# Patient Record
Sex: FEMALE | Race: WHITE | Employment: OTHER | URBAN - METROPOLITAN AREA
[De-identification: names, ages, dates, MRNs, and addresses within clinical notes are randomized per-mention and may not be internally consistent; named-entity substitution may affect disease eponyms.]

---

## 2017-05-17 PROBLEM — S02.401A MAXILLARY FRACTURE (HCC): Status: ACTIVE | Noted: 2017-05-17

## 2017-05-17 PROBLEM — S13.100A CLOSED SUBLUXATION OF CERVICAL SPINE: Status: ACTIVE | Noted: 2017-05-17

## 2017-08-29 PROBLEM — M54.6 ACUTE THORACIC BACK PAIN: Status: ACTIVE | Noted: 2017-08-29

## 2017-10-03 PROBLEM — M48.54XA COMPRESSION FRACTURE OF THORACIC SPINE, NON-TRAUMATIC (HCC): Status: ACTIVE | Noted: 2017-10-03

## 2017-10-09 PROBLEM — S02.401A MAXILLARY FRACTURE (HCC): Status: RESOLVED | Noted: 2017-05-17 | Resolved: 2017-10-09

## 2017-10-09 PROBLEM — M54.6 ACUTE THORACIC BACK PAIN: Status: RESOLVED | Noted: 2017-08-29 | Resolved: 2017-10-09

## 2018-03-13 RX ORDER — CEPHALEXIN 500 MG/1
500 CAPSULE ORAL 4 TIMES DAILY
Qty: 40 CAPSULE | Refills: 0 | Status: SHIPPED | OUTPATIENT
Start: 2018-03-13 | End: 2018-03-23

## 2018-04-19 ENCOUNTER — HOSPITAL ENCOUNTER (OUTPATIENT)
Age: 83
Discharge: HOME | End: 2018-04-21
Payer: COMMERCIAL

## 2018-04-19 ENCOUNTER — OFFICE VISIT (OUTPATIENT)
Dept: FAMILY MEDICINE CLINIC | Age: 83
End: 2018-04-19
Payer: COMMERCIAL

## 2018-04-19 VITALS
DIASTOLIC BLOOD PRESSURE: 99 MMHG | TEMPERATURE: 97.9 F | HEART RATE: 95 BPM | BODY MASS INDEX: 25.13 KG/M2 | RESPIRATION RATE: 16 BRPM | SYSTOLIC BLOOD PRESSURE: 149 MMHG | WEIGHT: 108.6 LBS | HEIGHT: 55 IN | OXYGEN SATURATION: 98 %

## 2018-04-19 DIAGNOSIS — Z87.440 HISTORY OF UTI: ICD-10-CM

## 2018-04-19 DIAGNOSIS — I10 ESSENTIAL HYPERTENSION: ICD-10-CM

## 2018-04-19 DIAGNOSIS — M48.54XS COMPRESSION FRACTURE OF THORACIC SPINE, NON-TRAUMATIC, SEQUELA: ICD-10-CM

## 2018-04-19 DIAGNOSIS — M80.00XS AGE-RELATED OSTEOPOROSIS WITH CURRENT PATHOLOGICAL FRACTURE, SEQUELA: ICD-10-CM

## 2018-04-19 DIAGNOSIS — K21.9 GASTROESOPHAGEAL REFLUX DISEASE WITHOUT ESOPHAGITIS: ICD-10-CM

## 2018-04-19 DIAGNOSIS — Z00.00 HEALTH CARE MAINTENANCE: Primary | ICD-10-CM

## 2018-04-19 LAB
BILIRUBIN, POC: NEGATIVE
BLOOD URINE, POC: NORMAL
CLARITY, POC: NORMAL
COLOR, POC: YELLOW
GLUCOSE URINE, POC: NEGATIVE
KETONES, POC: NEGATIVE
LEUKOCYTE EST, POC: NORMAL
NITRITE, POC: POSITIVE
PH, POC: 6.5
PROTEIN, POC: NEGATIVE
SPECIFIC GRAVITY, POC: 1.01
UROBILINOGEN, POC: 0.2

## 2018-04-19 PROCEDURE — 81002 URINALYSIS NONAUTO W/O SCOPE: CPT | Performed by: NURSE PRACTITIONER

## 2018-04-19 PROCEDURE — 87186 SC STD MICRODIL/AGAR DIL: CPT

## 2018-04-19 PROCEDURE — 87077 CULTURE AEROBIC IDENTIFY: CPT

## 2018-04-19 PROCEDURE — 87088 URINE BACTERIA CULTURE: CPT

## 2018-04-19 PROCEDURE — 99214 OFFICE O/P EST MOD 30 MIN: CPT | Performed by: NURSE PRACTITIONER

## 2018-04-19 RX ORDER — MIRTAZAPINE 15 MG/1
15 TABLET, FILM COATED ORAL NIGHTLY
Qty: 90 TABLET | Refills: 1 | Status: SHIPPED | OUTPATIENT
Start: 2018-04-19 | End: 2018-08-23 | Stop reason: SDUPTHER

## 2018-04-19 RX ORDER — CEPHALEXIN 500 MG/1
500 CAPSULE ORAL 3 TIMES DAILY
Qty: 30 CAPSULE | Refills: 0 | Status: SHIPPED | OUTPATIENT
Start: 2018-04-19 | End: 2018-04-29

## 2018-04-19 RX ORDER — LISINOPRIL 10 MG/1
10 TABLET ORAL DAILY
Qty: 90 TABLET | Refills: 1 | Status: SHIPPED | OUTPATIENT
Start: 2018-04-19 | End: 2018-10-22 | Stop reason: SDUPTHER

## 2018-04-19 RX ORDER — OMEPRAZOLE 20 MG/1
20 CAPSULE, DELAYED RELEASE ORAL 2 TIMES DAILY
Qty: 180 CAPSULE | Refills: 1 | Status: SHIPPED | OUTPATIENT
Start: 2018-04-19 | End: 2018-07-02 | Stop reason: DRUGHIGH

## 2018-04-19 RX ORDER — ALENDRONATE SODIUM 70 MG/1
70 TABLET ORAL
Qty: 12 TABLET | Refills: 3 | Status: SHIPPED | OUTPATIENT
Start: 2018-04-19 | End: 2019-05-31 | Stop reason: SDUPTHER

## 2018-04-19 RX ORDER — ACETAMINOPHEN AND CODEINE PHOSPHATE 300; 30 MG/1; MG/1
1-2 TABLET ORAL EVERY 6 HOURS PRN
Qty: 30 TABLET | Refills: 3 | Status: SHIPPED | OUTPATIENT
Start: 2018-04-19 | End: 2018-05-19

## 2018-04-22 ENCOUNTER — HOSPITAL ENCOUNTER (OUTPATIENT)
Age: 83
Discharge: HOME | End: 2018-04-22
Payer: COMMERCIAL

## 2018-04-22 DIAGNOSIS — I10 ESSENTIAL HYPERTENSION: ICD-10-CM

## 2018-04-22 DIAGNOSIS — Z00.00 HEALTH CARE MAINTENANCE: ICD-10-CM

## 2018-04-22 LAB
ALBUMIN SERPL-MCNC: 4.3 G/DL (ref 3.5–5.2)
ALP BLD-CCNC: 63 U/L (ref 35–104)
ALT SERPL-CCNC: 12 U/L (ref 0–32)
ANION GAP SERPL CALCULATED.3IONS-SCNC: 13 MMOL/L (ref 7–16)
AST SERPL-CCNC: 23 U/L (ref 0–31)
BILIRUB SERPL-MCNC: 0.2 MG/DL (ref 0–1.2)
BUN BLDV-MCNC: 21 MG/DL (ref 8–23)
CALCIUM SERPL-MCNC: 9.8 MG/DL (ref 8.6–10.2)
CHLORIDE BLD-SCNC: 105 MMOL/L (ref 98–107)
CHOLESTEROL, TOTAL: 181 MG/DL (ref 0–199)
CO2: 24 MMOL/L (ref 22–29)
CREAT SERPL-MCNC: 1.2 MG/DL (ref 0.5–1)
GFR AFRICAN AMERICAN: 51
GFR NON-AFRICAN AMERICAN: 42 ML/MIN/1.73
GLUCOSE BLD-MCNC: 114 MG/DL (ref 74–109)
HCT VFR BLD CALC: 34 % (ref 34–48)
HDLC SERPL-MCNC: 62 MG/DL
HEMOGLOBIN: 10.8 G/DL (ref 11.5–15.5)
LDL CHOLESTEROL CALCULATED: 93 MG/DL (ref 0–99)
MCH RBC QN AUTO: 29 PG (ref 26–35)
MCHC RBC AUTO-ENTMCNC: 31.8 % (ref 32–34.5)
MCV RBC AUTO: 91.2 FL (ref 80–99.9)
PDW BLD-RTO: 13.8 FL (ref 11.5–15)
PLATELET # BLD: 181 E9/L (ref 130–450)
PMV BLD AUTO: 9.1 FL (ref 7–12)
POTASSIUM SERPL-SCNC: 4.8 MMOL/L (ref 3.5–5)
RBC # BLD: 3.73 E12/L (ref 3.5–5.5)
SODIUM BLD-SCNC: 142 MMOL/L (ref 132–146)
TOTAL PROTEIN: 8.1 G/DL (ref 6.4–8.3)
TRIGL SERPL-MCNC: 132 MG/DL (ref 0–149)
TSH SERPL DL<=0.05 MIU/L-ACNC: 3.41 UIU/ML (ref 0.27–4.2)
VLDLC SERPL CALC-MCNC: 26 MG/DL
WBC # BLD: 7.1 E9/L (ref 4.5–11.5)

## 2018-04-22 PROCEDURE — 36415 COLL VENOUS BLD VENIPUNCTURE: CPT

## 2018-04-22 PROCEDURE — 80053 COMPREHEN METABOLIC PANEL: CPT

## 2018-04-22 PROCEDURE — 85027 COMPLETE CBC AUTOMATED: CPT

## 2018-04-22 PROCEDURE — 80061 LIPID PANEL: CPT

## 2018-04-22 PROCEDURE — 84443 ASSAY THYROID STIM HORMONE: CPT

## 2018-04-23 LAB — URINE CULTURE, ROUTINE: NORMAL

## 2018-04-29 ASSESSMENT — ENCOUNTER SYMPTOMS
ANAL BLEEDING: 0
EYE DISCHARGE: 0
BLOOD IN STOOL: 0
ABDOMINAL DISTENTION: 0
CONSTIPATION: 0
DIARRHEA: 0
CHEST TIGHTNESS: 0
NAUSEA: 0
WHEEZING: 0
STRIDOR: 0
EYE ITCHING: 0
EYE REDNESS: 0
RHINORRHEA: 0
EYE PAIN: 0
TROUBLE SWALLOWING: 0
CHOKING: 0
ABDOMINAL PAIN: 0
FACIAL SWELLING: 0
VOMITING: 0
SORE THROAT: 0
SHORTNESS OF BREATH: 0
VOICE CHANGE: 0
RECTAL PAIN: 0
APNEA: 0
COUGH: 0
PHOTOPHOBIA: 0
COLOR CHANGE: 0

## 2018-05-08 ENCOUNTER — NURSE ONLY (OUTPATIENT)
Dept: FAMILY MEDICINE CLINIC | Age: 83
End: 2018-05-08
Payer: COMMERCIAL

## 2018-05-08 DIAGNOSIS — K59.03 DRUG-INDUCED CONSTIPATION: ICD-10-CM

## 2018-05-08 DIAGNOSIS — R35.0 URINE FREQUENCY: Primary | ICD-10-CM

## 2018-05-08 DIAGNOSIS — M48.54XS COMPRESSION FRACTURE OF THORACIC SPINE, NON-TRAUMATIC, SEQUELA: ICD-10-CM

## 2018-05-08 LAB
APPEARANCE FLUID: NORMAL
BILIRUBIN, POC: NEGATIVE
BLOOD URINE, POC: NORMAL
CLARITY, POC: CLEAR
COLOR, POC: NORMAL
GLUCOSE URINE, POC: NEGATIVE
KETONES, POC: NEGATIVE
LEUKOCYTE EST, POC: NORMAL
NITRITE, POC: NEGATIVE
PH, POC: 5.5
PROTEIN, POC: NEGATIVE
SPECIFIC GRAVITY, POC: 1.02
UROBILINOGEN, POC: 0.2

## 2018-05-08 PROCEDURE — 81002 URINALYSIS NONAUTO W/O SCOPE: CPT | Performed by: NURSE PRACTITIONER

## 2018-05-08 RX ORDER — ACETAMINOPHEN AND CODEINE PHOSPHATE 300; 30 MG/1; MG/1
1-2 TABLET ORAL EVERY 6 HOURS PRN
Qty: 30 TABLET | Refills: 3 | Status: CANCELLED | OUTPATIENT
Start: 2018-05-08 | End: 2018-06-07

## 2018-05-08 RX ORDER — DOCUSATE SODIUM 100 MG/1
100 CAPSULE, LIQUID FILLED ORAL 2 TIMES DAILY
Qty: 60 CAPSULE | Refills: 2 | Status: CANCELLED | OUTPATIENT
Start: 2018-05-08

## 2018-05-11 ENCOUNTER — TELEPHONE (OUTPATIENT)
Dept: FAMILY MEDICINE CLINIC | Age: 83
End: 2018-05-11

## 2018-05-24 ENCOUNTER — HOSPITAL ENCOUNTER (OUTPATIENT)
Age: 83
Discharge: HOME | End: 2018-05-26
Payer: COMMERCIAL

## 2018-05-24 PROCEDURE — 87088 URINE BACTERIA CULTURE: CPT

## 2018-05-24 PROCEDURE — 87186 SC STD MICRODIL/AGAR DIL: CPT

## 2018-05-24 PROCEDURE — 87077 CULTURE AEROBIC IDENTIFY: CPT

## 2018-05-26 LAB
ORGANISM: ABNORMAL
URINE CULTURE, ROUTINE: ABNORMAL
URINE CULTURE, ROUTINE: ABNORMAL

## 2018-06-06 ENCOUNTER — HOSPITAL ENCOUNTER (OUTPATIENT)
Dept: ULTRASOUND IMAGING | Age: 83
Discharge: HOME | End: 2018-06-08
Payer: COMMERCIAL

## 2018-06-06 DIAGNOSIS — N39.0 URINARY TRACT INFECTION WITHOUT HEMATURIA, SITE UNSPECIFIED: ICD-10-CM

## 2018-06-06 DIAGNOSIS — N81.10 CYSTOCELE WITHOUT UTERINE PROLAPSE: ICD-10-CM

## 2018-06-06 PROCEDURE — 76770 US EXAM ABDO BACK WALL COMP: CPT

## 2018-06-13 ENCOUNTER — HOSPITAL ENCOUNTER (OUTPATIENT)
Age: 83
Discharge: HOME | End: 2018-06-15
Payer: COMMERCIAL

## 2018-06-13 PROCEDURE — 87077 CULTURE AEROBIC IDENTIFY: CPT

## 2018-06-13 PROCEDURE — 87186 SC STD MICRODIL/AGAR DIL: CPT

## 2018-06-13 PROCEDURE — 87088 URINE BACTERIA CULTURE: CPT

## 2018-06-15 LAB
ORGANISM: ABNORMAL
URINE CULTURE, ROUTINE: ABNORMAL
URINE CULTURE, ROUTINE: ABNORMAL

## 2018-06-25 ENCOUNTER — TELEPHONE (OUTPATIENT)
Dept: FAMILY MEDICINE CLINIC | Age: 83
End: 2018-06-25

## 2018-06-25 ENCOUNTER — HOSPITAL ENCOUNTER (OUTPATIENT)
Age: 83
Discharge: HOME | DRG: 690 | End: 2018-06-25
Payer: COMMERCIAL

## 2018-06-25 DIAGNOSIS — N39.0 RECURRENT UTI: Primary | ICD-10-CM

## 2018-06-25 DIAGNOSIS — N39.0 RECURRENT UTI: ICD-10-CM

## 2018-06-25 LAB
BACTERIA: ABNORMAL /HPF
BILIRUBIN URINE: NEGATIVE
BLOOD, URINE: ABNORMAL
CLARITY: ABNORMAL
COLOR: YELLOW
GLUCOSE URINE: NEGATIVE MG/DL
KETONES, URINE: NEGATIVE MG/DL
LEUKOCYTE ESTERASE, URINE: ABNORMAL
NITRITE, URINE: NEGATIVE
PH UA: 6.5 (ref 5–9)
PROTEIN UA: NEGATIVE MG/DL
RBC UA: ABNORMAL /HPF (ref 0–2)
SPECIFIC GRAVITY UA: 1.01 (ref 1–1.03)
UROBILINOGEN, URINE: 0.2 E.U./DL
WBC UA: >20 /HPF (ref 0–5)

## 2018-06-25 PROCEDURE — 87088 URINE BACTERIA CULTURE: CPT

## 2018-06-25 PROCEDURE — 87186 SC STD MICRODIL/AGAR DIL: CPT

## 2018-06-25 PROCEDURE — 81001 URINALYSIS AUTO W/SCOPE: CPT

## 2018-06-27 ENCOUNTER — HOSPITAL ENCOUNTER (INPATIENT)
Age: 83
LOS: 1 days | Discharge: HOME HEALTH | DRG: 690 | End: 2018-06-28
Attending: EMERGENCY MEDICINE | Admitting: INTERNAL MEDICINE
Payer: COMMERCIAL

## 2018-06-27 ENCOUNTER — ANESTHESIA EVENT (OUTPATIENT)
Dept: OPERATING ROOM | Age: 83
DRG: 690 | End: 2018-06-27
Payer: COMMERCIAL

## 2018-06-27 ENCOUNTER — APPOINTMENT (OUTPATIENT)
Dept: CT IMAGING | Age: 83
DRG: 690 | End: 2018-06-27
Payer: COMMERCIAL

## 2018-06-27 DIAGNOSIS — R52 PAIN: ICD-10-CM

## 2018-06-27 DIAGNOSIS — N20.0 KIDNEY STONE: Primary | ICD-10-CM

## 2018-06-27 DIAGNOSIS — R10.9 FLANK PAIN: ICD-10-CM

## 2018-06-27 LAB
ALBUMIN SERPL-MCNC: 4.3 G/DL (ref 3.5–5.2)
ALP BLD-CCNC: 52 U/L (ref 35–104)
ALT SERPL-CCNC: 10 U/L (ref 0–32)
ANION GAP SERPL CALCULATED.3IONS-SCNC: 14 MMOL/L (ref 7–16)
AST SERPL-CCNC: 18 U/L (ref 0–31)
BACTERIA: ABNORMAL /HPF
BILIRUB SERPL-MCNC: <0.2 MG/DL (ref 0–1.2)
BILIRUBIN URINE: NEGATIVE
BLOOD, URINE: ABNORMAL
BUN BLDV-MCNC: 30 MG/DL (ref 8–23)
CALCIUM SERPL-MCNC: 9 MG/DL (ref 8.6–10.2)
CHLORIDE BLD-SCNC: 105 MMOL/L (ref 98–107)
CLARITY: ABNORMAL
CO2: 20 MMOL/L (ref 22–29)
COLOR: YELLOW
CREAT SERPL-MCNC: 1.2 MG/DL (ref 0.5–1)
GFR AFRICAN AMERICAN: 51
GFR NON-AFRICAN AMERICAN: 42 ML/MIN/1.73
GLUCOSE BLD-MCNC: 144 MG/DL (ref 74–109)
GLUCOSE URINE: NEGATIVE MG/DL
HCT VFR BLD CALC: 31 % (ref 34–48)
HEMOGLOBIN: 10 G/DL (ref 11.5–15.5)
KETONES, URINE: NEGATIVE MG/DL
LACTIC ACID: 1.7 MMOL/L (ref 0.5–2.2)
LEUKOCYTE ESTERASE, URINE: ABNORMAL
MCH RBC QN AUTO: 29.2 PG (ref 26–35)
MCHC RBC AUTO-ENTMCNC: 32.3 % (ref 32–34.5)
MCV RBC AUTO: 90.6 FL (ref 80–99.9)
MUCUS: PRESENT
NITRITE, URINE: POSITIVE
PDW BLD-RTO: 14.4 FL (ref 11.5–15)
PH UA: 8.5 (ref 5–9)
PLATELET # BLD: 163 E9/L (ref 130–450)
PMV BLD AUTO: 9.7 FL (ref 7–12)
POTASSIUM SERPL-SCNC: 4.6 MMOL/L (ref 3.5–5)
PROTEIN UA: 100 MG/DL
RBC # BLD: 3.42 E12/L (ref 3.5–5.5)
RBC UA: ABNORMAL /HPF (ref 0–2)
SODIUM BLD-SCNC: 139 MMOL/L (ref 132–146)
SPECIFIC GRAVITY UA: 1.01 (ref 1–1.03)
TOTAL PROTEIN: 7.1 G/DL (ref 6.4–8.3)
UROBILINOGEN, URINE: 0.2 E.U./DL
WBC # BLD: 11.1 E9/L (ref 4.5–11.5)
WBC UA: >20 /HPF (ref 0–5)

## 2018-06-27 PROCEDURE — 85027 COMPLETE CBC AUTOMATED: CPT

## 2018-06-27 PROCEDURE — 3700000001 HC ADD 15 MINUTES (ANESTHESIA): Performed by: UROLOGY

## 2018-06-27 PROCEDURE — 74176 CT ABD & PELVIS W/O CONTRAST: CPT

## 2018-06-27 PROCEDURE — 6360000002 HC RX W HCPCS: Performed by: NURSE ANESTHETIST, CERTIFIED REGISTERED

## 2018-06-27 PROCEDURE — C2617 STENT, NON-COR, TEM W/O DEL: HCPCS | Performed by: UROLOGY

## 2018-06-27 PROCEDURE — 2500000003 HC RX 250 WO HCPCS: Performed by: NURSE ANESTHETIST, CERTIFIED REGISTERED

## 2018-06-27 PROCEDURE — 7100000001 HC PACU RECOVERY - ADDTL 15 MIN: Performed by: UROLOGY

## 2018-06-27 PROCEDURE — 7100000000 HC PACU RECOVERY - FIRST 15 MIN: Performed by: UROLOGY

## 2018-06-27 PROCEDURE — 99285 EMERGENCY DEPT VISIT HI MDM: CPT

## 2018-06-27 PROCEDURE — 2580000003 HC RX 258: Performed by: EMERGENCY MEDICINE

## 2018-06-27 PROCEDURE — 3600000003 HC SURGERY LEVEL 3 BASE: Performed by: UROLOGY

## 2018-06-27 PROCEDURE — C1769 GUIDE WIRE: HCPCS | Performed by: UROLOGY

## 2018-06-27 PROCEDURE — 0T768DZ DILATION OF RIGHT URETER WITH INTRALUMINAL DEVICE, VIA NATURAL OR ARTIFICIAL OPENING ENDOSCOPIC: ICD-10-PCS | Performed by: UROLOGY

## 2018-06-27 PROCEDURE — BT1D1ZZ FLUOROSCOPY OF RIGHT KIDNEY, URETER AND BLADDER USING LOW OSMOLAR CONTRAST: ICD-10-PCS | Performed by: UROLOGY

## 2018-06-27 PROCEDURE — 3700000000 HC ANESTHESIA ATTENDED CARE: Performed by: UROLOGY

## 2018-06-27 PROCEDURE — 81001 URINALYSIS AUTO W/SCOPE: CPT

## 2018-06-27 PROCEDURE — 36415 COLL VENOUS BLD VENIPUNCTURE: CPT

## 2018-06-27 PROCEDURE — 2580000003 HC RX 258: Performed by: NURSE ANESTHETIST, CERTIFIED REGISTERED

## 2018-06-27 PROCEDURE — 2060000000 HC ICU INTERMEDIATE R&B

## 2018-06-27 PROCEDURE — 87040 BLOOD CULTURE FOR BACTERIA: CPT

## 2018-06-27 PROCEDURE — 80053 COMPREHEN METABOLIC PANEL: CPT

## 2018-06-27 PROCEDURE — 83605 ASSAY OF LACTIC ACID: CPT

## 2018-06-27 PROCEDURE — 3600000013 HC SURGERY LEVEL 3 ADDTL 15MIN: Performed by: UROLOGY

## 2018-06-27 DEVICE — URETERAL STENT
Type: IMPLANTABLE DEVICE | Status: FUNCTIONAL
Brand: PERCUFLEX™

## 2018-06-27 RX ORDER — 0.9 % SODIUM CHLORIDE 0.9 %
500 INTRAVENOUS SOLUTION INTRAVENOUS ONCE
Status: COMPLETED | OUTPATIENT
Start: 2018-06-27 | End: 2018-06-28

## 2018-06-27 RX ORDER — PROPOFOL 10 MG/ML
INJECTION, EMULSION INTRAVENOUS PRN
Status: DISCONTINUED | OUTPATIENT
Start: 2018-06-27 | End: 2018-06-28 | Stop reason: SDUPTHER

## 2018-06-27 RX ORDER — FENTANYL CITRATE 50 UG/ML
INJECTION, SOLUTION INTRAMUSCULAR; INTRAVENOUS PRN
Status: DISCONTINUED | OUTPATIENT
Start: 2018-06-27 | End: 2018-06-28 | Stop reason: SDUPTHER

## 2018-06-27 RX ORDER — SODIUM CHLORIDE 9 MG/ML
INJECTION, SOLUTION INTRAVENOUS CONTINUOUS PRN
Status: DISCONTINUED | OUTPATIENT
Start: 2018-06-27 | End: 2018-06-28 | Stop reason: SDUPTHER

## 2018-06-27 RX ORDER — FENTANYL CITRATE 50 UG/ML
25 INJECTION, SOLUTION INTRAMUSCULAR; INTRAVENOUS EVERY 5 MIN PRN
Status: DISCONTINUED | OUTPATIENT
Start: 2018-06-27 | End: 2018-06-28

## 2018-06-27 RX ORDER — FENTANYL CITRATE 50 UG/ML
50 INJECTION, SOLUTION INTRAMUSCULAR; INTRAVENOUS EVERY 5 MIN PRN
Status: DISCONTINUED | OUTPATIENT
Start: 2018-06-27 | End: 2018-06-28

## 2018-06-27 RX ADMIN — SODIUM CHLORIDE 500 ML: 9 INJECTION, SOLUTION INTRAVENOUS at 20:50

## 2018-06-27 RX ADMIN — PROPOFOL 90 MG: 10 INJECTION, EMULSION INTRAVENOUS at 23:51

## 2018-06-27 RX ADMIN — LIDOCAINE HYDROCHLORIDE 50 MG: 20 INJECTION, SOLUTION EPIDURAL; INFILTRATION; INTRACAUDAL; PERINEURAL at 23:50

## 2018-06-27 RX ADMIN — SODIUM CHLORIDE: 9 INJECTION, SOLUTION INTRAVENOUS at 23:16

## 2018-06-27 RX ADMIN — FENTANYL CITRATE 20 MCG: 50 INJECTION, SOLUTION INTRAMUSCULAR; INTRAVENOUS at 23:41

## 2018-06-27 RX ADMIN — FENTANYL CITRATE 20 MCG: 50 INJECTION, SOLUTION INTRAMUSCULAR; INTRAVENOUS at 23:27

## 2018-06-27 ASSESSMENT — PAIN DESCRIPTION - PAIN TYPE: TYPE: ACUTE PAIN

## 2018-06-27 ASSESSMENT — PULMONARY FUNCTION TESTS
PIF_VALUE: 0
PIF_VALUE: 1
PIF_VALUE: 0

## 2018-06-27 ASSESSMENT — PAIN DESCRIPTION - ORIENTATION: ORIENTATION: RIGHT

## 2018-06-27 ASSESSMENT — PAIN SCALES - GENERAL: PAINLEVEL_OUTOF10: 6

## 2018-06-27 ASSESSMENT — PAIN DESCRIPTION - LOCATION: LOCATION: ABDOMEN;FLANK

## 2018-06-27 NOTE — ED NOTES
Bed: 20  Expected date:   Expected time:   Means of arrival:   Comments:  ems     Danny Joshi RN  06/27/18 [id number]
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
## 2018-06-28 ENCOUNTER — ANESTHESIA (OUTPATIENT)
Dept: OPERATING ROOM | Age: 83
DRG: 690 | End: 2018-06-28
Payer: COMMERCIAL

## 2018-06-28 ENCOUNTER — APPOINTMENT (OUTPATIENT)
Dept: GENERAL RADIOLOGY | Age: 83
DRG: 690 | End: 2018-06-28
Payer: COMMERCIAL

## 2018-06-28 VITALS
DIASTOLIC BLOOD PRESSURE: 64 MMHG | HEART RATE: 78 BPM | WEIGHT: 110 LBS | HEIGHT: 59 IN | SYSTOLIC BLOOD PRESSURE: 120 MMHG | BODY MASS INDEX: 22.18 KG/M2 | RESPIRATION RATE: 18 BRPM | TEMPERATURE: 99.9 F | OXYGEN SATURATION: 96 %

## 2018-06-28 VITALS — SYSTOLIC BLOOD PRESSURE: 124 MMHG | DIASTOLIC BLOOD PRESSURE: 91 MMHG | OXYGEN SATURATION: 98 %

## 2018-06-28 LAB
ANION GAP SERPL CALCULATED.3IONS-SCNC: 11 MMOL/L (ref 7–16)
BASOPHILS ABSOLUTE: 0.02 E9/L (ref 0–0.2)
BASOPHILS RELATIVE PERCENT: 0.3 % (ref 0–2)
BUN BLDV-MCNC: 22 MG/DL (ref 8–23)
CALCIUM SERPL-MCNC: 8 MG/DL (ref 8.6–10.2)
CHLORIDE BLD-SCNC: 108 MMOL/L (ref 98–107)
CO2: 20 MMOL/L (ref 22–29)
CREAT SERPL-MCNC: 1 MG/DL (ref 0.5–1)
EOSINOPHILS ABSOLUTE: 0.07 E9/L (ref 0.05–0.5)
EOSINOPHILS RELATIVE PERCENT: 1 % (ref 0–6)
GFR AFRICAN AMERICAN: >60
GFR NON-AFRICAN AMERICAN: 52 ML/MIN/1.73
GLUCOSE BLD-MCNC: 157 MG/DL (ref 74–109)
HCT VFR BLD CALC: 24.6 % (ref 34–48)
HEMOGLOBIN: 8.1 G/DL (ref 11.5–15.5)
IMMATURE GRANULOCYTES #: 0.03 E9/L
IMMATURE GRANULOCYTES %: 0.4 % (ref 0–5)
LYMPHOCYTES ABSOLUTE: 2.03 E9/L (ref 1.5–4)
LYMPHOCYTES RELATIVE PERCENT: 28 % (ref 20–42)
MCH RBC QN AUTO: 29.2 PG (ref 26–35)
MCHC RBC AUTO-ENTMCNC: 32.9 % (ref 32–34.5)
MCV RBC AUTO: 88.8 FL (ref 80–99.9)
MONOCYTES ABSOLUTE: 1.02 E9/L (ref 0.1–0.95)
MONOCYTES RELATIVE PERCENT: 14 % (ref 2–12)
NEUTROPHILS ABSOLUTE: 4.09 E9/L (ref 1.8–7.3)
NEUTROPHILS RELATIVE PERCENT: 56.3 % (ref 43–80)
ORGANISM: ABNORMAL
PDW BLD-RTO: 14.2 FL (ref 11.5–15)
PLATELET # BLD: 125 E9/L (ref 130–450)
PMV BLD AUTO: 10 FL (ref 7–12)
POTASSIUM REFLEX MAGNESIUM: 4.3 MMOL/L (ref 3.5–5)
RBC # BLD: 2.77 E12/L (ref 3.5–5.5)
SODIUM BLD-SCNC: 139 MMOL/L (ref 132–146)
URINE CULTURE, ROUTINE: ABNORMAL
URINE CULTURE, ROUTINE: ABNORMAL
WBC # BLD: 7.3 E9/L (ref 4.5–11.5)

## 2018-06-28 PROCEDURE — 6360000002 HC RX W HCPCS: Performed by: INTERNAL MEDICINE

## 2018-06-28 PROCEDURE — 2580000003 HC RX 258: Performed by: INTERNAL MEDICINE

## 2018-06-28 PROCEDURE — 80048 BASIC METABOLIC PNL TOTAL CA: CPT

## 2018-06-28 PROCEDURE — 2580000003 HC RX 258: Performed by: EMERGENCY MEDICINE

## 2018-06-28 PROCEDURE — 85025 COMPLETE CBC W/AUTO DIFF WBC: CPT

## 2018-06-28 PROCEDURE — 87088 URINE BACTERIA CULTURE: CPT

## 2018-06-28 PROCEDURE — 6360000002 HC RX W HCPCS: Performed by: EMERGENCY MEDICINE

## 2018-06-28 PROCEDURE — 74420 UROGRAPHY RTRGR +-KUB: CPT

## 2018-06-28 PROCEDURE — 6370000000 HC RX 637 (ALT 250 FOR IP): Performed by: UROLOGY

## 2018-06-28 PROCEDURE — 87186 SC STD MICRODIL/AGAR DIL: CPT

## 2018-06-28 PROCEDURE — 36415 COLL VENOUS BLD VENIPUNCTURE: CPT

## 2018-06-28 PROCEDURE — 6370000000 HC RX 637 (ALT 250 FOR IP): Performed by: INTERNAL MEDICINE

## 2018-06-28 RX ORDER — ONDANSETRON 2 MG/ML
4 INJECTION INTRAMUSCULAR; INTRAVENOUS EVERY 6 HOURS PRN
Status: DISCONTINUED | OUTPATIENT
Start: 2018-06-28 | End: 2018-06-28 | Stop reason: HOSPADM

## 2018-06-28 RX ORDER — ACETAMINOPHEN 325 MG/1
650 TABLET ORAL EVERY 4 HOURS PRN
Status: DISCONTINUED | OUTPATIENT
Start: 2018-06-28 | End: 2018-06-28 | Stop reason: HOSPADM

## 2018-06-28 RX ORDER — DIPHENHYDRAMINE HCL 25 MG
25 TABLET ORAL EVERY 6 HOURS PRN
Status: DISCONTINUED | OUTPATIENT
Start: 2018-06-28 | End: 2018-06-28 | Stop reason: HOSPADM

## 2018-06-28 RX ORDER — MIRTAZAPINE 15 MG/1
15 TABLET, FILM COATED ORAL NIGHTLY
Status: DISCONTINUED | OUTPATIENT
Start: 2018-06-28 | End: 2018-06-28 | Stop reason: HOSPADM

## 2018-06-28 RX ORDER — CEPHALEXIN 250 MG/1
500 CAPSULE ORAL 2 TIMES DAILY
Qty: 84 CAPSULE | Refills: 0 | Status: SHIPPED | OUTPATIENT
Start: 2018-06-28 | End: 2018-07-02 | Stop reason: DRUGHIGH

## 2018-06-28 RX ORDER — OXYCODONE HYDROCHLORIDE AND ACETAMINOPHEN 5; 325 MG/1; MG/1
1 TABLET ORAL EVERY 4 HOURS PRN
Status: DISCONTINUED | OUTPATIENT
Start: 2018-06-28 | End: 2018-06-28 | Stop reason: HOSPADM

## 2018-06-28 RX ORDER — LIDOCAINE HYDROCHLORIDE 20 MG/ML
INJECTION, SOLUTION EPIDURAL; INFILTRATION; INTRACAUDAL; PERINEURAL PRN
Status: DISCONTINUED | OUTPATIENT
Start: 2018-06-27 | End: 2018-06-28 | Stop reason: SDUPTHER

## 2018-06-28 RX ORDER — ALENDRONATE SODIUM 70 MG/1
70 TABLET ORAL
Status: DISCONTINUED | OUTPATIENT
Start: 2018-06-28 | End: 2018-06-28 | Stop reason: CLARIF

## 2018-06-28 RX ORDER — SODIUM CHLORIDE 0.9 % (FLUSH) 0.9 %
10 SYRINGE (ML) INJECTION PRN
Status: DISCONTINUED | OUTPATIENT
Start: 2018-06-28 | End: 2018-06-28 | Stop reason: SDUPTHER

## 2018-06-28 RX ORDER — SODIUM CHLORIDE 9 MG/ML
INJECTION, SOLUTION INTRAVENOUS CONTINUOUS
Status: DISCONTINUED | OUTPATIENT
Start: 2018-06-28 | End: 2018-06-28 | Stop reason: HOSPADM

## 2018-06-28 RX ORDER — OXYCODONE HYDROCHLORIDE AND ACETAMINOPHEN 5; 325 MG/1; MG/1
2 TABLET ORAL EVERY 4 HOURS PRN
Status: DISCONTINUED | OUTPATIENT
Start: 2018-06-28 | End: 2018-06-28 | Stop reason: HOSPADM

## 2018-06-28 RX ORDER — SODIUM CHLORIDE 0.9 % (FLUSH) 0.9 %
10 SYRINGE (ML) INJECTION EVERY 12 HOURS SCHEDULED
Status: DISCONTINUED | OUTPATIENT
Start: 2018-06-28 | End: 2018-06-28 | Stop reason: HOSPADM

## 2018-06-28 RX ORDER — SODIUM CHLORIDE 0.9 % (FLUSH) 0.9 %
10 SYRINGE (ML) INJECTION PRN
Status: DISCONTINUED | OUTPATIENT
Start: 2018-06-28 | End: 2018-06-28 | Stop reason: HOSPADM

## 2018-06-28 RX ORDER — SODIUM CHLORIDE 0.9 % (FLUSH) 0.9 %
10 SYRINGE (ML) INJECTION EVERY 12 HOURS SCHEDULED
Status: DISCONTINUED | OUTPATIENT
Start: 2018-06-28 | End: 2018-06-28 | Stop reason: SDUPTHER

## 2018-06-28 RX ORDER — LISINOPRIL 10 MG/1
10 TABLET ORAL DAILY
Status: DISCONTINUED | OUTPATIENT
Start: 2018-06-28 | End: 2018-06-28 | Stop reason: HOSPADM

## 2018-06-28 RX ADMIN — SODIUM CHLORIDE: 9 INJECTION, SOLUTION INTRAVENOUS at 14:00

## 2018-06-28 RX ADMIN — LISINOPRIL 10 MG: 10 TABLET ORAL at 09:56

## 2018-06-28 RX ADMIN — DEXTROSE MONOHYDRATE 1 G: 5 INJECTION, SOLUTION INTRAVENOUS at 00:43

## 2018-06-28 RX ADMIN — ACETAMINOPHEN 650 MG: 325 TABLET, FILM COATED ORAL at 14:03

## 2018-06-28 RX ADMIN — VANCOMYCIN HYDROCHLORIDE 1000 MG: 1 INJECTION, POWDER, LYOPHILIZED, FOR SOLUTION INTRAVENOUS at 04:33

## 2018-06-28 RX ADMIN — WATER 1 G: 1 INJECTION INTRAMUSCULAR; INTRAVENOUS; SUBCUTANEOUS at 14:00

## 2018-06-28 RX ADMIN — ENOXAPARIN SODIUM 30 MG: 30 INJECTION SUBCUTANEOUS at 09:56

## 2018-06-28 RX ADMIN — SODIUM CHLORIDE: 9 INJECTION, SOLUTION INTRAVENOUS at 02:09

## 2018-06-28 ASSESSMENT — PAIN DESCRIPTION - PROGRESSION: CLINICAL_PROGRESSION: NOT CHANGED

## 2018-06-28 ASSESSMENT — PAIN DESCRIPTION - FREQUENCY: FREQUENCY: INTERMITTENT

## 2018-06-28 ASSESSMENT — PAIN SCALES - GENERAL
PAINLEVEL_OUTOF10: 0
PAINLEVEL_OUTOF10: 6
PAINLEVEL_OUTOF10: 0

## 2018-06-28 ASSESSMENT — PAIN DESCRIPTION - PAIN TYPE: TYPE: ACUTE PAIN

## 2018-06-28 ASSESSMENT — PAIN DESCRIPTION - LOCATION: LOCATION: FLANK

## 2018-06-28 ASSESSMENT — PAIN DESCRIPTION - ONSET: ONSET: SUDDEN

## 2018-06-28 ASSESSMENT — PAIN DESCRIPTION - ORIENTATION: ORIENTATION: RIGHT

## 2018-06-28 ASSESSMENT — PAIN DESCRIPTION - DESCRIPTORS: DESCRIPTORS: ACHING;DISCOMFORT;SORE

## 2018-06-28 NOTE — PROGRESS NOTES
Pharmacy Note    Truefrain Bright was ordered fosamax. Per the [address], this medication is non-formulary and not stocked by pharmacy for the reason indicated below. The medication can be reordered at discharge.      Medications in which risks outweigh benefits during hospitalization:         -  oral bisphosphonates         -  raloxifene (Evista)    Medications that lack necessity during an acute hospital stay:      -  ezetimibe (Zetia)         -  nasal antihistamines        -  nasal miacalcin        -  acyclovir topical cream/ointment orders for herpes labialis (cold sores)
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
## 2018-06-28 NOTE — H&P
Reviewed in detail and negative for DM, CAD, Cancer, CVA. Positive as follows:    Family History   Problem Relation Age of Onset    Alzheimer's Disease Mother        REVIEW OF SYSTEMS:   Pertinent positives as noted in the HPI. All other systems reviewed and negative. PHYSICAL EXAM:    BP (!) 157/74   Pulse 76   Temp 98.2 °F (36.8 °C)   Resp 16   Ht 4' 11\" (1.499 m)   Wt 110 lb (49.9 kg)   SpO2 95%   BMI 22.22 kg/m²   General appearance: No apparent distress, appears stated age and cooperative. HEENT: Normal cephalic, atraumatic without obvious deformity. . Conjunctivae/corneas clear. Neck: Supple, No jugular venous distention. Respiratory:  Normal respiratory effort. Clear to auscultation, bilaterally without Rales/Wheezes/Rhonchi. Cardiovascular: Regular rate and rhythm with normal S1/S2 without murmurs, rubs or gallops. Abdomen: Soft,  non-distended with normal bowel sounds. Musculoskeletal: No  edema bilaterally. Skin:  No rashes or lesions. Neurologic:  Cranial nerves: II-XII intact, grossly non-focal.  Psychiatric: Alert and oriented, thought content appropriate. Labs:     Recent Labs      06/27/18 2015   WBC  11.1   HGB  10.0*   HCT  31.0*   PLT  163     Recent Labs      06/27/18 2015   NA  139   K  4.6   CL  105   CO2  20*   BUN  30*   CREATININE  1.2*   CALCIUM  9.0     Recent Labs      06/27/18 2015   AST  18   ALT  10   BILITOT  <0.2   ALKPHOS  52     No results for input(s): INR in the last 72 hours. No results for input(s): Lenice Blitz in the last 72 hours. Urinalysis:      Lab Results   Component Value Date    NITRU POSITIVE 06/27/2018    WBCUA >20 06/27/2018    BACTERIA MODERATE 06/27/2018    RBCUA 0-1 06/27/2018    BLOODU LARGE 06/27/2018    SPECGRAV 1.015 06/27/2018    GLUCOSEU Negative 06/27/2018         ASSESSMENT:    Active Hospital Problems    Diagnosis Date Noted    Kidney stone [N20.0] 06/27/2018     Flank pain: Secondary to renal stones.    Renal stone: Urology following.  IV hydration and IV antibiotics  Hydronephrosis-right  Solitary kidney  Hypertension   gerd  GILMER  UTI-send a urine culture  normocyic anemia      PLAN:  Ceftriaxone IV  Urology following  IV normal saline gentle hydration  Continue medications  Lisinopril  Blood cultures  Urine culture  Follow-up renal function tomorrow  Lower extremity prophylaxis   Full code     Ismael Diop MD

## 2018-06-28 NOTE — ED NOTES
Consent form signed by León Orlando for procedure and placed in chart      Chon Peralta RN  06/27/18 [id number]

## 2018-06-28 NOTE — PROGRESS NOTES
REPAIR  1974    TONSILLECTOMY  1939    TYMPANOSTOMY TUBE PLACEMENT  2000    UPPER GASTROINTESTINAL ENDOSCOPY  2003    WRIST FRACTURE SURGERY  1992        PAST FAMILY HISTORY:    Family History   Problem Relation Age of Onset    Alzheimer's Disease Mother        PAST SOCIAL HISTORY:    Social History     Social History    Marital status:      Spouse name: N/A    Number of children: N/A    Years of education: N/A     Social History Main Topics    Smoking status: Never Smoker    Smokeless tobacco: Never Used    Alcohol use No    Drug use: No    Sexual activity: Not Currently     Other Topics Concern    None     Social History Narrative    None       IV:    sodium chloride 75 mL/hr at 06/28/18 0209       PRN: acetaminophen, sodium chloride flush, magnesium hydroxide, ondansetron, diphenhydrAMINE, oxyCODONE-acetaminophen **OR** oxyCODONE-acetaminophen    Scheduled:    sodium chloride flush  10 mL Intravenous 2 times per day    enoxaparin  30 mg Subcutaneous Daily    mirtazapine  15 mg Oral Nightly    lisinopril  10 mg Oral Daily       Lab Results   Component Value Date     06/28/2018    K 4.3 06/28/2018    BUN 22 06/28/2018    CREATININE 1.0 06/28/2018        Lab Results   Component Value Date    HGB 8.1 06/28/2018    HCT 24.6 06/28/2018       Constitutional: Tired  Eyes: negative  Ears, nose, mouth, throat, and face: negative  Respiratory: negative  Cardiovascular: negative  Gastrointestinal: negative  Genitourinary: Stone  Musculoskeletal: Arthritis  Neurological: negative  Behavioral/Psych: negative  Endocrine: negative    Skin dry, without rashes  Respirations non-labored, intact  Abdomen soft, non-tender, non-distended. Active bowel sounds. Alert and oriented x3  Velez draining clear, yellow urine      Assessment and Plan:  POD#1-S/P Cysto/Right stent insertion  -Cultures are pending.  Continue broad-spectrum antibiotics until her cultures are finalized  -Continue the Velez until she is ambulatory  -Right ureteroscopy with laser lithotripsy stone removal and stent removal in 3-4 weeks as an outpatient after resolution of her acute infection  -She is stable for discharge from my standpoint.       Mark Shirley MD  6/28/2018  8:12 AM

## 2018-06-28 NOTE — CONSULTS
[place] CYSTOSCOPY,INSERT URETERAL STENT N/A 6/27/2018    CYSTOSCOPY RETROGRADE PYELOGRAM STENT INSERTION performed by Fadi Villeda MD at [address] [place] [address]    UPPER GASTROINTESTINAL ENDOSCOPY  2003    WRIST FRACTURE SURGERY  1992     Current Medications:   Scheduled Meds:   sodium chloride flush  10 mL Intravenous 2 times per day    enoxaparin  30 mg Subcutaneous Daily    mirtazapine  15 mg Oral Nightly    lisinopril  10 mg Oral Daily    cefTRIAXone (ROCEPHIN) IV  1 g Intravenous Q24H     Continuous Infusions:   sodium chloride 75 mL/hr at 06/28/18 1400     PRN Meds:acetaminophen, sodium chloride flush, magnesium hydroxide, ondansetron, diphenhydrAMINE, oxyCODONE-acetaminophen **OR** oxyCODONE-acetaminophen    Allergies:  Celebrex [celecoxib]; Dust mite extract; Penicillins; Propulsid [cisapride]; Demerol hcl [meperidine]; and Percodan [oxycodone-aspirin]    Social History:   Social History     Social History    Marital status:      Spouse name: N/A    Number of children: N/A    Years of education: N/A     Social History Main Topics    Smoking status: Never Smoker    Smokeless tobacco: Never Used    Alcohol use No    Drug use: No    Sexual activity: Not Currently     Other Topics Concern    None     Social History Narrative    None       Family History:   Family History   Problem Relation Age of Onset    Alzheimer's Disease Mother    . Otherwise non-pertinent to the chief complaint.     REVIEW OF SYSTEMS:    14 ROS negative unless otherwise specified in the HPI    PHYSICAL EXAM:    Vitals:    /64 Comment: manual  Pulse 78   Temp 99.9 °F (37.7 °C) (Temporal)   Resp 18   Ht 4' 11\" (1.499 m)   Wt 110 lb (49.9 kg)   SpO2 96%   BMI 22.22 kg/m²     General Appearance:    Alert, cooperative, no distress, appears stated age   Head:    Normocephalic, without obvious abnormality, atraumatic   Eyes:    PERRL, conjunctiva/corneas clear      Ears:    Normal and external ear canals, both ears   Nose:   Nares normal, septum midline, mucosa normal, no drainage    or sinus tenderness   Throat:   Lips, mucosa, and tongue normal; teeth and gums normal   Neck:   Supple, trachea midline, no adenopathy; no JVD   Lungs:     Clear to auscultation bilaterally, respirations unlabored   Chest wall:    No tenderness or deformity   Heart:    Regular rate and rhythm, S1 and S2 normal, gr 2 systolic murmur,no rub   or gallop   Abdomen:     Soft, non-tender, bowel sounds active all four quadrants,     no masses, no organomegaly, R CVA tenderness   Extremities:   Extremities normal, atraumatic, no cyanosis or edema   Pulses:   2+ and symmetric all extremities   Skin:   Skin color, texture, turgor normal, no rashes or lesions       CBC+dif:  Reviewed and trend followed    Radiology:  Noted    Microbiology:  Pending  No results for input(s): BC in the last 72 hours. Recent Labs      06/25/18   1648   ORG  Proteus mirabilis*     No results for input(s): BLOODCULT2 in the last 72 hours. No results for input(s): STREPNEUMAGU in the last 72 hours. No results for input(s): LP1UAG in the last 72 hours. No results for input(s): ASO in the last 72 hours. No results for input(s): CULTRESP in the last 72 hours. Assessment:  · Acute complicated UTI with R sided ureteral stone and hydronephrosis s/p R ureteral stent insertion on 6/28/18  · MDR proteus UTI    Plan:    · Cont iv rocephin in house  · Okay to discharge from ID POV- can switch to oral keflex 250 mg q12 for 21 days or until the stent is removed whichever is longer  · F/u with ID in weeks  · Monitor labs  · Will follow with you    Thank you for having us see this patient in consultation. I will be discussing this case with the treating physicians.     Electronically signed by Altagracia Gibbons MD on 6/28/2018 at 2:11 PM

## 2018-06-29 ENCOUNTER — TELEPHONE (OUTPATIENT)
Dept: FAMILY MEDICINE CLINIC | Age: 83
End: 2018-06-29

## 2018-06-29 NOTE — TELEPHONE ENCOUNTER
[address] called wanting to know if you would follow patient for home care. Advised patient is coming in on Monday for hospital follow up on Monday. They are going out to see her on Sunday to evaluate and admit for care.

## 2018-06-30 LAB
ORGANISM: ABNORMAL
URINE CULTURE, ROUTINE: ABNORMAL
URINE CULTURE, ROUTINE: ABNORMAL

## 2018-07-02 ENCOUNTER — OFFICE VISIT (OUTPATIENT)
Dept: FAMILY MEDICINE CLINIC | Age: 83
End: 2018-07-02
Payer: COMMERCIAL

## 2018-07-02 VITALS
HEIGHT: 55 IN | TEMPERATURE: 98.1 F | BODY MASS INDEX: 24.76 KG/M2 | DIASTOLIC BLOOD PRESSURE: 88 MMHG | RESPIRATION RATE: 20 BRPM | HEART RATE: 79 BPM | SYSTOLIC BLOOD PRESSURE: 132 MMHG | WEIGHT: 107 LBS | OXYGEN SATURATION: 97 %

## 2018-07-02 DIAGNOSIS — N39.0 RECURRENT UTI: ICD-10-CM

## 2018-07-02 DIAGNOSIS — N20.0 KIDNEY STONE: Primary | ICD-10-CM

## 2018-07-02 LAB
BILIRUBIN, POC: NORMAL
BLOOD URINE, POC: NORMAL
CLARITY, POC: NORMAL
COLOR, POC: YELLOW
GLUCOSE URINE, POC: NORMAL
KETONES, POC: NORMAL
LEUKOCYTE EST, POC: NORMAL
NITRITE, POC: NORMAL
PH, POC: 6.5
PROTEIN, POC: 100
SPECIFIC GRAVITY, POC: 1.01
UROBILINOGEN, POC: 0.2

## 2018-07-02 PROCEDURE — 99495 TRANSJ CARE MGMT MOD F2F 14D: CPT | Performed by: NURSE PRACTITIONER

## 2018-07-02 PROCEDURE — 81002 URINALYSIS NONAUTO W/O SCOPE: CPT | Performed by: NURSE PRACTITIONER

## 2018-07-02 PROCEDURE — 1111F DSCHRG MED/CURRENT MED MERGE: CPT | Performed by: NURSE PRACTITIONER

## 2018-07-02 RX ORDER — OMEPRAZOLE 40 MG/1
40 CAPSULE, DELAYED RELEASE ORAL DAILY
Refills: 1 | COMMUNITY
Start: 2018-06-29 | End: 2018-10-22 | Stop reason: SDUPTHER

## 2018-07-02 RX ORDER — CEPHALEXIN 500 MG/1
1000 CAPSULE ORAL 2 TIMES DAILY
Refills: 0 | COMMUNITY
Start: 2018-06-15 | End: 2019-03-28 | Stop reason: ALTCHOICE

## 2018-07-02 RX ORDER — ACETAMINOPHEN AND CODEINE PHOSPHATE 300; 30 MG/1; MG/1
1 TABLET ORAL
Refills: 3 | COMMUNITY
Start: 2018-06-19 | End: 2018-10-22 | Stop reason: SDUPTHER

## 2018-07-02 ASSESSMENT — ENCOUNTER SYMPTOMS
EYE PAIN: 0
CHOKING: 0
DIARRHEA: 0
ABDOMINAL DISTENTION: 0
VOMITING: 0
NAUSEA: 0
BLOOD IN STOOL: 0
COLOR CHANGE: 0
EYE REDNESS: 0
CHEST TIGHTNESS: 0
ABDOMINAL PAIN: 0
STRIDOR: 0
EYE DISCHARGE: 0
EYE ITCHING: 0
PHOTOPHOBIA: 0
SHORTNESS OF BREATH: 0
COUGH: 0
CONSTIPATION: 0
VOICE CHANGE: 0
WHEEZING: 0
TROUBLE SWALLOWING: 0

## 2018-07-02 NOTE — PROGRESS NOTES
Constitutional: She is oriented to person, place, and time. She appears well-developed and well-nourished. No distress. HENT:   Head: Normocephalic and atraumatic. Right Ear: External ear normal.   Left Ear: External ear normal.   Nose: Nose normal.   Mouth/Throat: Oropharynx is clear and moist. No oropharyngeal exudate. Eyes: Conjunctivae and EOM are normal. Pupils are equal, round, and reactive to light. Right eye exhibits no discharge. Left eye exhibits no discharge. Neck: Normal range of motion. Neck supple. No JVD present. No thyromegaly present. Cardiovascular: Normal rate, regular rhythm, normal heart sounds and intact distal pulses. No murmur heard. No peripheral edema   Pulmonary/Chest: Effort normal and breath sounds normal. No respiratory distress. She has no wheezes. She has no rales. She exhibits no tenderness. Abdominal: Soft. Bowel sounds are normal. She exhibits no distension and no mass. There is no tenderness (mild/moderate suprapubic tenderness). There is no rebound and no guarding. Lymphadenopathy:     She has no cervical adenopathy. Neurological: She is alert and oriented to person, place, and time. Skin: Skin is warm and dry. No rash noted. She is not diaphoretic. No erythema. No pallor. Nursing note and vitals reviewed. Assessment/Plan:  Rhea Moncada was seen today for follow-up from hospital, nephrolithiasis and urinary tract infection.     Diagnoses and all orders for this visit:    Kidney stone  -     POCT Urinalysis no Micro  -     CO DISCHARGE MEDS RECONCILED W/ CURRENT OUTPATIENT MED LIST    Recurrent UTI  -     CO DISCHARGE MEDS RECONCILED W/ CURRENT OUTPATIENT MED LIST          Medical Decision Making: moderate complexity

## 2018-07-03 LAB
BLOOD CULTURE, ROUTINE: NORMAL
CULTURE, BLOOD 2: NORMAL

## 2018-07-17 ENCOUNTER — TELEPHONE (OUTPATIENT)
Dept: FAMILY MEDICINE CLINIC | Age: 83
End: 2018-07-17

## 2018-07-17 NOTE — PROGRESS NOTES
surgery we would greatly appreciate your comments    [] Parent/guardian of a minor must accompany their child and remain on the premises  the entire time they are under our care     [] Pediatric patients may bring favorite toy, blanket or comfort item with them    [x] A caregiver or family member must remain with the patient during their stay if they are mentally handicapped, have dementia, disoriented or unable to use a call light or would be a safety concern if left unattended    [x] Please notify surgeon if you develop any illness between now and time of surgery (cold, cough, sore throat, fever, nausea, vomiting) or any signs of infections  including skin, wounds, and dental.    [x] Other instructions    EDUCATIONAL MATERIALS PROVIDED:    [] PAT Preoperative Education Packet/Booklet     [] Medication List    [] Fluoroscopy Information Pamphlet    [] Transfusion bracelet applied with instructions    [] Joint replacement video reviewed    [] Shower with antibacterial soap and use CHG wipes provided the evening before surgery as instructed

## 2018-07-18 ENCOUNTER — TELEPHONE (OUTPATIENT)
Dept: FAMILY MEDICINE CLINIC | Age: 83
End: 2018-07-18

## 2018-07-18 NOTE — TELEPHONE ENCOUNTER
Daughter Zheng Mathur called, she will be finishing up antibiotic Thursday. Dr. Zoë De La Cruz will be checking her urine on Friday. Dr. Zoë De La Cruz will be scheduling surgery sometime in August, awaiting date.  Rosalinda's phone numbers cell [phone], work [phone] ext [phone]

## 2018-07-25 ENCOUNTER — HOSPITAL ENCOUNTER (OUTPATIENT)
Age: 83
Setting detail: OUTPATIENT SURGERY
Discharge: HOME | End: 2018-07-25
Attending: UROLOGY | Admitting: UROLOGY
Payer: COMMERCIAL

## 2018-07-25 ENCOUNTER — ANESTHESIA EVENT (OUTPATIENT)
Dept: OPERATING ROOM | Age: 83
End: 2018-07-25
Payer: COMMERCIAL

## 2018-07-25 ENCOUNTER — APPOINTMENT (OUTPATIENT)
Dept: GENERAL RADIOLOGY | Age: 83
End: 2018-07-25
Attending: UROLOGY
Payer: COMMERCIAL

## 2018-07-25 ENCOUNTER — ANESTHESIA (OUTPATIENT)
Dept: OPERATING ROOM | Age: 83
End: 2018-07-25
Payer: COMMERCIAL

## 2018-07-25 VITALS
DIASTOLIC BLOOD PRESSURE: 77 MMHG | HEART RATE: 66 BPM | BODY MASS INDEX: 25.46 KG/M2 | TEMPERATURE: 97 F | SYSTOLIC BLOOD PRESSURE: 145 MMHG | WEIGHT: 110 LBS | OXYGEN SATURATION: 96 % | HEIGHT: 55 IN | RESPIRATION RATE: 20 BRPM

## 2018-07-25 VITALS — OXYGEN SATURATION: 100 % | DIASTOLIC BLOOD PRESSURE: 67 MMHG | SYSTOLIC BLOOD PRESSURE: 150 MMHG

## 2018-07-25 DIAGNOSIS — N20.0 KIDNEY STONE: Primary | ICD-10-CM

## 2018-07-25 PROCEDURE — 2709999900 HC NON-CHARGEABLE SUPPLY: Performed by: UROLOGY

## 2018-07-25 PROCEDURE — 3600000013 HC SURGERY LEVEL 3 ADDTL 15MIN: Performed by: UROLOGY

## 2018-07-25 PROCEDURE — 7100000011 HC PHASE II RECOVERY - ADDTL 15 MIN: Performed by: UROLOGY

## 2018-07-25 PROCEDURE — C1769 GUIDE WIRE: HCPCS | Performed by: UROLOGY

## 2018-07-25 PROCEDURE — 3600000003 HC SURGERY LEVEL 3 BASE: Performed by: UROLOGY

## 2018-07-25 PROCEDURE — 6360000002 HC RX W HCPCS: Performed by: UROLOGY

## 2018-07-25 PROCEDURE — 82365 CALCULUS SPECTROSCOPY: CPT

## 2018-07-25 PROCEDURE — 3209999900 FLUORO FOR SURGICAL PROCEDURES

## 2018-07-25 PROCEDURE — 6360000002 HC RX W HCPCS: Performed by: NURSE ANESTHETIST, CERTIFIED REGISTERED

## 2018-07-25 PROCEDURE — 88300 SURGICAL PATH GROSS: CPT

## 2018-07-25 PROCEDURE — 3700000000 HC ANESTHESIA ATTENDED CARE: Performed by: UROLOGY

## 2018-07-25 PROCEDURE — 7100000010 HC PHASE II RECOVERY - FIRST 15 MIN: Performed by: UROLOGY

## 2018-07-25 PROCEDURE — 2720000010 HC SURG SUPPLY STERILE: Performed by: UROLOGY

## 2018-07-25 PROCEDURE — 2580000003 HC RX 258: Performed by: NURSE ANESTHETIST, CERTIFIED REGISTERED

## 2018-07-25 PROCEDURE — 3700000001 HC ADD 15 MINUTES (ANESTHESIA): Performed by: UROLOGY

## 2018-07-25 RX ORDER — HYDROCODONE BITARTRATE AND ACETAMINOPHEN 5; 325 MG/1; MG/1
1 TABLET ORAL EVERY 6 HOURS PRN
Qty: 10 TABLET | Refills: 0 | Status: SHIPPED | OUTPATIENT
Start: 2018-07-25 | End: 2020-01-01 | Stop reason: SDUPTHER

## 2018-07-25 RX ORDER — SODIUM CHLORIDE, SODIUM LACTATE, POTASSIUM CHLORIDE, CALCIUM CHLORIDE 600; 310; 30; 20 MG/100ML; MG/100ML; MG/100ML; MG/100ML
INJECTION, SOLUTION INTRAVENOUS CONTINUOUS PRN
Status: DISCONTINUED | OUTPATIENT
Start: 2018-07-25 | End: 2018-07-25 | Stop reason: SDUPTHER

## 2018-07-25 RX ORDER — SODIUM CHLORIDE 0.9 % (FLUSH) 0.9 %
10 SYRINGE (ML) INJECTION EVERY 12 HOURS SCHEDULED
Status: DISCONTINUED | OUTPATIENT
Start: 2018-07-25 | End: 2018-07-25 | Stop reason: HOSPADM

## 2018-07-25 RX ORDER — NITROFURANTOIN 25; 75 MG/1; MG/1
100 CAPSULE ORAL 2 TIMES DAILY
Qty: 10 CAPSULE | Refills: 0 | Status: SHIPPED | OUTPATIENT
Start: 2018-07-25 | End: 2018-07-30

## 2018-07-25 RX ORDER — CIPROFLOXACIN 2 MG/ML
400 INJECTION, SOLUTION INTRAVENOUS
Status: COMPLETED | OUTPATIENT
Start: 2018-07-25 | End: 2018-07-25

## 2018-07-25 RX ORDER — PROPOFOL 10 MG/ML
INJECTION, EMULSION INTRAVENOUS CONTINUOUS PRN
Status: DISCONTINUED | OUTPATIENT
Start: 2018-07-25 | End: 2018-07-25 | Stop reason: SDUPTHER

## 2018-07-25 RX ORDER — SODIUM CHLORIDE 0.9 % (FLUSH) 0.9 %
10 SYRINGE (ML) INJECTION PRN
Status: DISCONTINUED | OUTPATIENT
Start: 2018-07-25 | End: 2018-07-25 | Stop reason: HOSPADM

## 2018-07-25 RX ORDER — FENTANYL CITRATE 50 UG/ML
INJECTION, SOLUTION INTRAMUSCULAR; INTRAVENOUS PRN
Status: DISCONTINUED | OUTPATIENT
Start: 2018-07-25 | End: 2018-07-25 | Stop reason: SDUPTHER

## 2018-07-25 RX ADMIN — SODIUM CHLORIDE, POTASSIUM CHLORIDE, SODIUM LACTATE AND CALCIUM CHLORIDE: 600; 310; 30; 20 INJECTION, SOLUTION INTRAVENOUS at 07:51

## 2018-07-25 RX ADMIN — FENTANYL CITRATE 25 MCG: 50 INJECTION, SOLUTION INTRAMUSCULAR; INTRAVENOUS at 08:03

## 2018-07-25 RX ADMIN — PROPOFOL 100 MCG/KG/MIN: 10 INJECTION, EMULSION INTRAVENOUS at 07:56

## 2018-07-25 RX ADMIN — FENTANYL CITRATE 25 MCG: 50 INJECTION, SOLUTION INTRAMUSCULAR; INTRAVENOUS at 07:55

## 2018-07-25 RX ADMIN — FENTANYL CITRATE 50 MCG: 50 INJECTION, SOLUTION INTRAMUSCULAR; INTRAVENOUS at 07:52

## 2018-07-25 RX ADMIN — CIPROFLOXACIN 400 MG: 2 INJECTION, SOLUTION INTRAVENOUS at 06:39

## 2018-07-25 ASSESSMENT — PULMONARY FUNCTION TESTS
PIF_VALUE: 1
PIF_VALUE: 0
PIF_VALUE: 1
PIF_VALUE: 0
PIF_VALUE: 1
PIF_VALUE: 0
PIF_VALUE: 1
PIF_VALUE: 1
PIF_VALUE: 0
PIF_VALUE: 0
PIF_VALUE: 1

## 2018-07-25 ASSESSMENT — PAIN - FUNCTIONAL ASSESSMENT: PAIN_FUNCTIONAL_ASSESSMENT: 0-10

## 2018-07-25 ASSESSMENT — PAIN SCALES - GENERAL: PAINLEVEL_OUTOF10: 0

## 2018-07-25 NOTE — ANESTHESIA PRE PROCEDURE
disease (only has one kidney.): kidney stones,          ROS comment: Early sepsis secondary to kidney stone. Endo/Other:    (+) : arthritis: OA., .                  ROS comment: Hx of Mumps   Chicken pox   Whooping cough    Citizen of [place] measles   Osteoporosis    Anemia Abdominal:           Vascular: negative vascular ROS. Anesthesia Plan      MAC     ASA 4     (20g LFA)  Induction: intravenous. MIPS: Postoperative opioids intended. Anesthetic plan and risks discussed with patient. Plan discussed with CRNA and attending.           [address] Jonah D eJesus DO   7/25/2018  6:38 AM

## 2018-07-25 NOTE — BRIEF OP NOTE
[facility]  Urology Post-operative Note    Juan Shetty  YOB: 1930  [id number]    Pre-operative Diagnosis: right ureteral calc    Post-operative Diagnosis:  See dictated operative report    Procedure: See dictated operative note    Surgeon: Colonel Terrell MOSES Memo    Estimated Blood Loss:  See dictated operatiive report    Complications: none      Iris Share Bj
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
## 2018-07-30 LAB
CALCULI COMPOSITION: NORMAL
MASS: 108 MG
STONE DESCRIPTION: NORMAL
STONE NUMBER: 1
STONE SIZE: NORMAL MM

## 2018-08-23 ENCOUNTER — TELEPHONE (OUTPATIENT)
Dept: FAMILY MEDICINE CLINIC | Age: 83
End: 2018-08-23

## 2018-08-23 RX ORDER — MIRTAZAPINE 30 MG/1
30 TABLET, FILM COATED ORAL NIGHTLY
Qty: 30 TABLET | Refills: 0 | Status: SHIPPED | OUTPATIENT
Start: 2018-08-23 | End: 2018-10-22 | Stop reason: SDUPTHER

## 2018-08-23 NOTE — TELEPHONE ENCOUNTER
Daughter Zheng Mathur called, would like to know if you can prescribe something for sleep or increase dose of remeron?  Her work phone number [phone] ext [phone]

## 2018-08-30 ENCOUNTER — HOSPITAL ENCOUNTER (OUTPATIENT)
Age: 83
Discharge: HOME | End: 2018-09-01
Payer: COMMERCIAL

## 2018-08-30 PROCEDURE — 87077 CULTURE AEROBIC IDENTIFY: CPT

## 2018-08-30 PROCEDURE — 87186 SC STD MICRODIL/AGAR DIL: CPT

## 2018-08-30 PROCEDURE — 87088 URINE BACTERIA CULTURE: CPT

## 2018-09-02 LAB
ORGANISM: ABNORMAL
URINE CULTURE, ROUTINE: ABNORMAL
URINE CULTURE, ROUTINE: ABNORMAL

## 2018-10-22 ENCOUNTER — HOSPITAL ENCOUNTER (OUTPATIENT)
Age: 83
Discharge: HOME | End: 2018-10-24
Payer: COMMERCIAL

## 2018-10-22 ENCOUNTER — OFFICE VISIT (OUTPATIENT)
Dept: FAMILY MEDICINE CLINIC | Age: 83
End: 2018-10-22
Payer: COMMERCIAL

## 2018-10-22 VITALS
SYSTOLIC BLOOD PRESSURE: 118 MMHG | OXYGEN SATURATION: 96 % | BODY MASS INDEX: 23.05 KG/M2 | WEIGHT: 99.6 LBS | DIASTOLIC BLOOD PRESSURE: 84 MMHG | HEART RATE: 96 BPM | HEIGHT: 55 IN | TEMPERATURE: 97.8 F | RESPIRATION RATE: 20 BRPM

## 2018-10-22 DIAGNOSIS — K59.03 DRUG-INDUCED CONSTIPATION: ICD-10-CM

## 2018-10-22 DIAGNOSIS — K21.9 GASTROESOPHAGEAL REFLUX DISEASE WITHOUT ESOPHAGITIS: ICD-10-CM

## 2018-10-22 DIAGNOSIS — M54.6 CHRONIC MIDLINE THORACIC BACK PAIN: ICD-10-CM

## 2018-10-22 DIAGNOSIS — Z87.440 HISTORY OF RECURRENT UTIS: ICD-10-CM

## 2018-10-22 DIAGNOSIS — Z00.00 HEALTHCARE MAINTENANCE: Primary | ICD-10-CM

## 2018-10-22 DIAGNOSIS — G89.29 CHRONIC MIDLINE THORACIC BACK PAIN: ICD-10-CM

## 2018-10-22 DIAGNOSIS — G47.09 OTHER INSOMNIA: ICD-10-CM

## 2018-10-22 DIAGNOSIS — R82.90 ABNORMAL FINDING IN URINE: ICD-10-CM

## 2018-10-22 DIAGNOSIS — M48.54XS: ICD-10-CM

## 2018-10-22 DIAGNOSIS — I10 ESSENTIAL HYPERTENSION: ICD-10-CM

## 2018-10-22 LAB
BILIRUBIN, POC: NORMAL
BLOOD URINE, POC: NORMAL
CLARITY, POC: YELLOW
COLOR, POC: NORMAL
GLUCOSE URINE, POC: NORMAL
KETONES, POC: NORMAL
LEUKOCYTE EST, POC: NORMAL
NITRITE, POC: NORMAL
PH, POC: 6
PROTEIN, POC: NORMAL
SPECIFIC GRAVITY, POC: 1.01
UROBILINOGEN, POC: 0.2

## 2018-10-22 PROCEDURE — 87088 URINE BACTERIA CULTURE: CPT

## 2018-10-22 PROCEDURE — 99215 OFFICE O/P EST HI 40 MIN: CPT | Performed by: NURSE PRACTITIONER

## 2018-10-22 PROCEDURE — 81002 URINALYSIS NONAUTO W/O SCOPE: CPT | Performed by: NURSE PRACTITIONER

## 2018-10-22 RX ORDER — INFLUENZA A VIRUS A/MICHIGAN/45/2015 X-275 (H1N1) ANTIGEN (FORMALDEHYDE INACTIVATED), INFLUENZA A VIRUS A/SINGAPORE/INFIMH-16-0019/2016 IVR-186 (H3N2) ANTIGEN (FORMALDEHYDE INACTIVATED), AND INFLUENZA B VIRUS B/MARYLAND/15/2016 BX-69A (A B/COLORADO/6/2017-LIKE VIRUS) ANTIGEN (FORMALDEHYDE INACTIVATED) 60; 60; 60 UG/.5ML; UG/.5ML; UG/.5ML
INJECTION, SUSPENSION INTRAMUSCULAR
COMMUNITY
Start: 2018-09-20 | End: 2018-10-22

## 2018-10-22 RX ORDER — DOCUSATE SODIUM 100 MG/1
100 CAPSULE, LIQUID FILLED ORAL 2 TIMES DAILY
Qty: 60 CAPSULE | Refills: 3 | Status: SHIPPED | OUTPATIENT
Start: 2018-10-22 | End: 2019-04-04

## 2018-10-22 RX ORDER — ACETAMINOPHEN AND CODEINE PHOSPHATE 300; 30 MG/1; MG/1
1 TABLET ORAL
Qty: 30 TABLET | Refills: 0 | Status: SHIPPED | OUTPATIENT
Start: 2018-10-22 | End: 2019-10-07 | Stop reason: SDUPTHER

## 2018-10-22 RX ORDER — MIRTAZAPINE 30 MG/1
30 TABLET, FILM COATED ORAL NIGHTLY
Qty: 30 TABLET | Refills: 3 | Status: SHIPPED | OUTPATIENT
Start: 2018-10-22 | End: 2019-04-04

## 2018-10-22 RX ORDER — OMEPRAZOLE 40 MG/1
40 CAPSULE, DELAYED RELEASE ORAL DAILY
Qty: 30 CAPSULE | Refills: 3 | Status: SHIPPED | OUTPATIENT
Start: 2018-10-22 | End: 2018-10-28 | Stop reason: SINTOL

## 2018-10-22 RX ORDER — LISINOPRIL 10 MG/1
10 TABLET ORAL DAILY
Qty: 30 TABLET | Refills: 3 | Status: SHIPPED | OUTPATIENT
Start: 2018-10-22 | End: 2019-02-08 | Stop reason: SDUPTHER

## 2018-10-22 ASSESSMENT — ENCOUNTER SYMPTOMS
WHEEZING: 0
NAUSEA: 0
ANAL BLEEDING: 0
CHEST TIGHTNESS: 0
ABDOMINAL DISTENTION: 0
BLOOD IN STOOL: 0
SHORTNESS OF BREATH: 0
RECTAL PAIN: 0
TROUBLE SWALLOWING: 0
EYE ITCHING: 0
SORE THROAT: 0
EYE PAIN: 0
APNEA: 0
DIARRHEA: 0
EYE DISCHARGE: 0
ABDOMINAL PAIN: 0
CONSTIPATION: 0
FACIAL SWELLING: 0
COLOR CHANGE: 0
COUGH: 0
PHOTOPHOBIA: 0
VOICE CHANGE: 0
CHOKING: 0
VOMITING: 0
STRIDOR: 0
RHINORRHEA: 0
EYE REDNESS: 0

## 2018-10-22 ASSESSMENT — PATIENT HEALTH QUESTIONNAIRE - PHQ9
2. FEELING DOWN, DEPRESSED OR HOPELESS: 1
SUM OF ALL RESPONSES TO PHQ QUESTIONS 1-9: 2
SUM OF ALL RESPONSES TO PHQ9 QUESTIONS 1 & 2: 2
1. LITTLE INTEREST OR PLEASURE IN DOING THINGS: 1
SUM OF ALL RESPONSES TO PHQ QUESTIONS 1-9: 2

## 2018-10-22 NOTE — PROGRESS NOTES
10/22/2018   Attestation The Prescription Monitoring Report for this patient was reviewed today. Documentation Possible medication side effects, risk of tolerance/dependence & alternative treatments discussed. ;No signs of potential drug abuse or diversion identified. Call or go to ED immediately if symptoms worsen or persist.    Return in about 6 months (around 4/22/2019) for f/u HTN/GERD/Insomnia/Chronic Back Pain. , or sooner if necessary. Educational materials and/or home exercises printed for patient's review and were included in patient instructions on his/her After Visit Summary and given to patient at the end of visit. Counseled regarding above diagnosis, including possible risks and complications,  especially if left uncontrolled. Counseled regarding the possible side effects, risks, benefits and alternatives to treatment; patient and/or guardian verbalizes understanding, agrees, feels comfortable with and wishes to proceed with above treatment plan. Advised patient to call with any new medication issues, and read all Rx info from pharmacy to assure aware of all possible risks and side effects of medication before taking. Reviewed age and gender appropriate health screening exams and vaccinations. Advised patient regarding importance of keeping up with recommended health maintenance and to schedule as soon as possible if overdue, as this is important in assessing for undiagnosed pathology, especially cancer, as well as protecting against potentially harmful/life threatening disease. Patient and/or guardian verbalizes understanding and agrees with above counseling, assessment and plan. All questions answered.     Talita Hartman, APRN - CNP

## 2018-10-24 LAB — URINE CULTURE, ROUTINE: NORMAL

## 2018-10-27 ENCOUNTER — HOSPITAL ENCOUNTER (OUTPATIENT)
Age: 83
Discharge: HOME | End: 2018-10-27
Payer: COMMERCIAL

## 2018-10-27 DIAGNOSIS — Z00.00 HEALTHCARE MAINTENANCE: ICD-10-CM

## 2018-10-27 DIAGNOSIS — I10 ESSENTIAL HYPERTENSION: ICD-10-CM

## 2018-10-27 LAB
ALBUMIN SERPL-MCNC: 4.2 G/DL (ref 3.5–5.2)
ALP BLD-CCNC: 64 U/L (ref 35–104)
ALT SERPL-CCNC: 10 U/L (ref 0–32)
ANION GAP SERPL CALCULATED.3IONS-SCNC: 15 MMOL/L (ref 7–16)
AST SERPL-CCNC: 21 U/L (ref 0–31)
BASOPHILS ABSOLUTE: 0.04 E9/L (ref 0–0.2)
BASOPHILS RELATIVE PERCENT: 0.6 % (ref 0–2)
BILIRUB SERPL-MCNC: 0.3 MG/DL (ref 0–1.2)
BUN BLDV-MCNC: 24 MG/DL (ref 8–23)
CALCIUM SERPL-MCNC: 9.1 MG/DL (ref 8.6–10.2)
CHLORIDE BLD-SCNC: 106 MMOL/L (ref 98–107)
CHOLESTEROL, TOTAL: 175 MG/DL (ref 0–199)
CO2: 24 MMOL/L (ref 22–29)
CREAT SERPL-MCNC: 1.3 MG/DL (ref 0.5–1)
EOSINOPHILS ABSOLUTE: 0.14 E9/L (ref 0.05–0.5)
EOSINOPHILS RELATIVE PERCENT: 2 % (ref 0–6)
GFR AFRICAN AMERICAN: 47
GFR NON-AFRICAN AMERICAN: 39 ML/MIN/1.73
GLUCOSE BLD-MCNC: 99 MG/DL (ref 74–109)
HCT VFR BLD CALC: 34.9 % (ref 34–48)
HDLC SERPL-MCNC: 65 MG/DL
HEMOGLOBIN: 10.6 G/DL (ref 11.5–15.5)
IMMATURE GRANULOCYTES #: 0.03 E9/L
IMMATURE GRANULOCYTES %: 0.4 % (ref 0–5)
LDL CHOLESTEROL CALCULATED: 91 MG/DL (ref 0–99)
LYMPHOCYTES ABSOLUTE: 3.27 E9/L (ref 1.5–4)
LYMPHOCYTES RELATIVE PERCENT: 45.7 % (ref 20–42)
MCH RBC QN AUTO: 28.1 PG (ref 26–35)
MCHC RBC AUTO-ENTMCNC: 30.4 % (ref 32–34.5)
MCV RBC AUTO: 92.6 FL (ref 80–99.9)
MONOCYTES ABSOLUTE: 0.6 E9/L (ref 0.1–0.95)
MONOCYTES RELATIVE PERCENT: 8.4 % (ref 2–12)
NEUTROPHILS ABSOLUTE: 3.08 E9/L (ref 1.8–7.3)
NEUTROPHILS RELATIVE PERCENT: 42.9 % (ref 43–80)
PDW BLD-RTO: 14.5 FL (ref 11.5–15)
PLATELET # BLD: 185 E9/L (ref 130–450)
PMV BLD AUTO: 9.3 FL (ref 7–12)
POTASSIUM SERPL-SCNC: 4.6 MMOL/L (ref 3.5–5)
RBC # BLD: 3.77 E12/L (ref 3.5–5.5)
SODIUM BLD-SCNC: 145 MMOL/L (ref 132–146)
TOTAL PROTEIN: 7.4 G/DL (ref 6.4–8.3)
TRIGL SERPL-MCNC: 95 MG/DL (ref 0–149)
TSH SERPL DL<=0.05 MIU/L-ACNC: 2.58 UIU/ML (ref 0.27–4.2)
VLDLC SERPL CALC-MCNC: 19 MG/DL
WBC # BLD: 7.2 E9/L (ref 4.5–11.5)

## 2018-10-27 PROCEDURE — 85025 COMPLETE CBC W/AUTO DIFF WBC: CPT

## 2018-10-27 PROCEDURE — 36415 COLL VENOUS BLD VENIPUNCTURE: CPT

## 2018-10-27 PROCEDURE — 80061 LIPID PANEL: CPT

## 2018-10-27 PROCEDURE — 84443 ASSAY THYROID STIM HORMONE: CPT

## 2018-10-27 PROCEDURE — 80053 COMPREHEN METABOLIC PANEL: CPT

## 2018-10-28 DIAGNOSIS — N28.9 ABNORMAL KIDNEY FUNCTION: Primary | ICD-10-CM

## 2018-10-28 RX ORDER — RANITIDINE 150 MG/1
150 TABLET ORAL NIGHTLY
Qty: 30 TABLET | Refills: 3 | Status: SHIPPED | OUTPATIENT
Start: 2018-10-28 | End: 2019-03-28 | Stop reason: ALTCHOICE

## 2018-11-10 ENCOUNTER — HOSPITAL ENCOUNTER (OUTPATIENT)
Age: 83
Discharge: HOME | End: 2018-11-10
Payer: COMMERCIAL

## 2018-11-10 DIAGNOSIS — N28.9 ABNORMAL KIDNEY FUNCTION: ICD-10-CM

## 2018-11-10 LAB
ALBUMIN SERPL-MCNC: 4.1 G/DL (ref 3.5–5.2)
ALP BLD-CCNC: 65 U/L (ref 35–104)
ALT SERPL-CCNC: 11 U/L (ref 0–32)
ANION GAP SERPL CALCULATED.3IONS-SCNC: 14 MMOL/L (ref 7–16)
AST SERPL-CCNC: 20 U/L (ref 0–31)
BILIRUB SERPL-MCNC: <0.2 MG/DL (ref 0–1.2)
BUN BLDV-MCNC: 25 MG/DL (ref 8–23)
CALCIUM SERPL-MCNC: 9.6 MG/DL (ref 8.6–10.2)
CHLORIDE BLD-SCNC: 107 MMOL/L (ref 98–107)
CO2: 22 MMOL/L (ref 22–29)
CREAT SERPL-MCNC: 1.3 MG/DL (ref 0.5–1)
GFR AFRICAN AMERICAN: 47
GFR NON-AFRICAN AMERICAN: 39 ML/MIN/1.73
GLUCOSE BLD-MCNC: 113 MG/DL (ref 74–99)
POTASSIUM SERPL-SCNC: 4.7 MMOL/L (ref 3.5–5)
SODIUM BLD-SCNC: 143 MMOL/L (ref 132–146)
TOTAL PROTEIN: 7.1 G/DL (ref 6.4–8.3)

## 2018-11-10 PROCEDURE — 80053 COMPREHEN METABOLIC PANEL: CPT

## 2018-11-10 PROCEDURE — 36415 COLL VENOUS BLD VENIPUNCTURE: CPT

## 2019-01-01 ASSESSMENT — ENCOUNTER SYMPTOMS
COLOR CHANGE: 0
WHEEZING: 0
BLOOD IN STOOL: 0
EYE PAIN: 0
CONSTIPATION: 0
SORE THROAT: 0
STRIDOR: 0
EYE DISCHARGE: 0
PHOTOPHOBIA: 0
ANAL BLEEDING: 0
TROUBLE SWALLOWING: 0
DIARRHEA: 0
VOMITING: 0
ABDOMINAL PAIN: 1
COUGH: 0
NAUSEA: 0
CHEST TIGHTNESS: 0
APNEA: 0
RHINORRHEA: 0
CHOKING: 0
VOICE CHANGE: 0
EYE ITCHING: 0
SHORTNESS OF BREATH: 0
EYE REDNESS: 0
RECTAL PAIN: 0
ABDOMINAL DISTENTION: 0
FACIAL SWELLING: 0

## 2019-02-08 DIAGNOSIS — I10 ESSENTIAL HYPERTENSION: ICD-10-CM

## 2019-02-08 RX ORDER — LISINOPRIL 10 MG/1
10 TABLET ORAL DAILY
Qty: 30 TABLET | Refills: 5 | Status: SHIPPED | OUTPATIENT
Start: 2019-02-08 | End: 2019-05-28 | Stop reason: SDUPTHER

## 2019-03-28 ENCOUNTER — APPOINTMENT (OUTPATIENT)
Dept: CT IMAGING | Age: 84
DRG: 378 | End: 2019-03-28
Payer: COMMERCIAL

## 2019-03-28 ENCOUNTER — TELEPHONE (OUTPATIENT)
Dept: FAMILY MEDICINE CLINIC | Age: 84
End: 2019-03-28

## 2019-03-28 ENCOUNTER — HOSPITAL ENCOUNTER (INPATIENT)
Age: 84
LOS: 4 days | Discharge: HOME | DRG: 378 | End: 2019-04-01
Attending: EMERGENCY MEDICINE | Admitting: HOSPITALIST
Payer: COMMERCIAL

## 2019-03-28 DIAGNOSIS — R10.13 EPIGASTRIC PAIN: Primary | ICD-10-CM

## 2019-03-28 DIAGNOSIS — D64.9 ANEMIA, UNSPECIFIED TYPE: ICD-10-CM

## 2019-03-28 PROBLEM — R10.9 ABDOMINAL PAIN: Status: ACTIVE | Noted: 2019-03-28

## 2019-03-28 LAB
ABO/RH: NORMAL
ALBUMIN SERPL-MCNC: 3.8 G/DL (ref 3.5–5.2)
ALP BLD-CCNC: 52 U/L (ref 35–104)
ALT SERPL-CCNC: 10 U/L (ref 0–32)
ANION GAP SERPL CALCULATED.3IONS-SCNC: 13 MMOL/L (ref 7–16)
ANTIBODY IDENTIFICATION: NORMAL
ANTIBODY SCREEN: NORMAL
AST SERPL-CCNC: 21 U/L (ref 0–31)
BACTERIA: ABNORMAL /HPF
BASOPHILS ABSOLUTE: 0.01 E9/L (ref 0–0.2)
BASOPHILS RELATIVE PERCENT: 0.1 % (ref 0–2)
BILIRUB SERPL-MCNC: <0.2 MG/DL (ref 0–1.2)
BILIRUBIN URINE: NEGATIVE
BLOOD, URINE: ABNORMAL
BUN BLDV-MCNC: 31 MG/DL (ref 8–23)
CALCIUM SERPL-MCNC: 8.9 MG/DL (ref 8.6–10.2)
CHLORIDE BLD-SCNC: 99 MMOL/L (ref 98–107)
CLARITY: ABNORMAL
CO2: 21 MMOL/L (ref 22–29)
COLOR: YELLOW
CREAT SERPL-MCNC: 1.2 MG/DL (ref 0.5–1)
DAT POLYSPECIFIC: NORMAL
DR. NOTIFY: NORMAL
EOSINOPHILS ABSOLUTE: 0.04 E9/L (ref 0.05–0.5)
EOSINOPHILS RELATIVE PERCENT: 0.4 % (ref 0–6)
GFR AFRICAN AMERICAN: 51
GFR NON-AFRICAN AMERICAN: 42 ML/MIN/1.73
GLUCOSE BLD-MCNC: 145 MG/DL (ref 74–99)
GLUCOSE URINE: NEGATIVE MG/DL
HCT VFR BLD CALC: 20.4 % (ref 34–48)
HCT VFR BLD CALC: 21 % (ref 34–48)
HEMOGLOBIN: 6.1 G/DL (ref 11.5–15.5)
HEMOGLOBIN: 6.3 G/DL (ref 11.5–15.5)
IMMATURE GRANULOCYTES #: 0.04 E9/L
IMMATURE GRANULOCYTES %: 0.4 % (ref 0–5)
KETONES, URINE: NEGATIVE MG/DL
LACTIC ACID: 2.9 MMOL/L (ref 0.5–2.2)
LEUKOCYTE ESTERASE, URINE: ABNORMAL
LIPASE: 28 U/L (ref 13–60)
LYMPHOCYTES ABSOLUTE: 2.12 E9/L (ref 1.5–4)
LYMPHOCYTES RELATIVE PERCENT: 20.8 % (ref 20–42)
MCH RBC QN AUTO: 25.8 PG (ref 26–35)
MCHC RBC AUTO-ENTMCNC: 29.9 % (ref 32–34.5)
MCV RBC AUTO: 86.4 FL (ref 80–99.9)
MONOCYTES ABSOLUTE: 0.88 E9/L (ref 0.1–0.95)
MONOCYTES RELATIVE PERCENT: 8.6 % (ref 2–12)
NEUTROPHILS ABSOLUTE: 7.1 E9/L (ref 1.8–7.3)
NEUTROPHILS RELATIVE PERCENT: 69.7 % (ref 43–80)
NITRITE, URINE: POSITIVE
PDW BLD-RTO: 14.2 FL (ref 11.5–15)
PH UA: 7 (ref 5–9)
PLATELET # BLD: 285 E9/L (ref 130–450)
PMV BLD AUTO: 9.3 FL (ref 7–12)
POTASSIUM REFLEX MAGNESIUM: 4.7 MMOL/L (ref 3.5–5)
PROTEIN UA: NEGATIVE MG/DL
RBC # BLD: 2.36 E12/L (ref 3.5–5.5)
RBC UA: ABNORMAL /HPF (ref 0–2)
SODIUM BLD-SCNC: 133 MMOL/L (ref 132–146)
SPECIFIC GRAVITY UA: <=1.005 (ref 1–1.03)
TOTAL PROTEIN: 6.7 G/DL (ref 6.4–8.3)
TROPONIN: <0.01 NG/ML (ref 0–0.03)
UROBILINOGEN, URINE: 0.2 E.U./DL
WBC # BLD: 10.2 E9/L (ref 4.5–11.5)
WBC UA: >20 /HPF (ref 0–5)

## 2019-03-28 PROCEDURE — 1200000000 HC SEMI PRIVATE

## 2019-03-28 PROCEDURE — 85014 HEMATOCRIT: CPT

## 2019-03-28 PROCEDURE — 2580000003 HC RX 258: Performed by: EMERGENCY MEDICINE

## 2019-03-28 PROCEDURE — 86901 BLOOD TYPING SEROLOGIC RH(D): CPT

## 2019-03-28 PROCEDURE — 83605 ASSAY OF LACTIC ACID: CPT

## 2019-03-28 PROCEDURE — 83690 ASSAY OF LIPASE: CPT

## 2019-03-28 PROCEDURE — 87088 URINE BACTERIA CULTURE: CPT

## 2019-03-28 PROCEDURE — 86900 BLOOD TYPING SEROLOGIC ABO: CPT

## 2019-03-28 PROCEDURE — 85025 COMPLETE CBC W/AUTO DIFF WBC: CPT

## 2019-03-28 PROCEDURE — 84484 ASSAY OF TROPONIN QUANT: CPT

## 2019-03-28 PROCEDURE — 74177 CT ABD & PELVIS W/CONTRAST: CPT

## 2019-03-28 PROCEDURE — 86870 RBC ANTIBODY IDENTIFICATION: CPT

## 2019-03-28 PROCEDURE — 36415 COLL VENOUS BLD VENIPUNCTURE: CPT

## 2019-03-28 PROCEDURE — 86880 COOMBS TEST DIRECT: CPT

## 2019-03-28 PROCEDURE — 85018 HEMOGLOBIN: CPT

## 2019-03-28 PROCEDURE — 96374 THER/PROPH/DIAG INJ IV PUSH: CPT

## 2019-03-28 PROCEDURE — 87040 BLOOD CULTURE FOR BACTERIA: CPT

## 2019-03-28 PROCEDURE — 6360000002 HC RX W HCPCS

## 2019-03-28 PROCEDURE — 80053 COMPREHEN METABOLIC PANEL: CPT

## 2019-03-28 PROCEDURE — 81001 URINALYSIS AUTO W/SCOPE: CPT

## 2019-03-28 PROCEDURE — 99285 EMERGENCY DEPT VISIT HI MDM: CPT

## 2019-03-28 PROCEDURE — 96375 TX/PRO/DX INJ NEW DRUG ADDON: CPT

## 2019-03-28 PROCEDURE — 6360000004 HC RX CONTRAST MEDICATION: Performed by: RADIOLOGY

## 2019-03-28 PROCEDURE — 86922 COMPATIBILITY TEST ANTIGLOB: CPT

## 2019-03-28 PROCEDURE — 6360000002 HC RX W HCPCS: Performed by: EMERGENCY MEDICINE

## 2019-03-28 PROCEDURE — 86850 RBC ANTIBODY SCREEN: CPT

## 2019-03-28 PROCEDURE — 2500000003 HC RX 250 WO HCPCS: Performed by: EMERGENCY MEDICINE

## 2019-03-28 RX ORDER — ONDANSETRON 2 MG/ML
4 INJECTION INTRAMUSCULAR; INTRAVENOUS EVERY 6 HOURS PRN
Status: DISCONTINUED | OUTPATIENT
Start: 2019-03-28 | End: 2019-03-28

## 2019-03-28 RX ORDER — MORPHINE SULFATE 10 MG/5ML
4 SOLUTION ORAL ONCE
Status: DISCONTINUED | OUTPATIENT
Start: 2019-03-28 | End: 2019-03-28

## 2019-03-28 RX ORDER — SODIUM CHLORIDE 0.9 % (FLUSH) 0.9 %
10 SYRINGE (ML) INJECTION
Status: ACTIVE | OUTPATIENT
Start: 2019-03-28 | End: 2019-03-28

## 2019-03-28 RX ORDER — SODIUM CHLORIDE 0.9 % (FLUSH) 0.9 %
10 SYRINGE (ML) INJECTION PRN
Status: DISCONTINUED | OUTPATIENT
Start: 2019-03-28 | End: 2019-04-01 | Stop reason: HOSPADM

## 2019-03-28 RX ORDER — SODIUM CHLORIDE 0.9 % (FLUSH) 0.9 %
10 SYRINGE (ML) INJECTION EVERY 12 HOURS SCHEDULED
Status: DISCONTINUED | OUTPATIENT
Start: 2019-03-28 | End: 2019-04-01 | Stop reason: HOSPADM

## 2019-03-28 RX ORDER — MORPHINE SULFATE 4 MG/ML
INJECTION, SOLUTION INTRAMUSCULAR; INTRAVENOUS
Status: DISCONTINUED
Start: 2019-03-28 | End: 2019-03-28

## 2019-03-28 RX ORDER — 0.9 % SODIUM CHLORIDE 0.9 %
250 INTRAVENOUS SOLUTION INTRAVENOUS ONCE
Status: DISCONTINUED | OUTPATIENT
Start: 2019-03-28 | End: 2019-04-01 | Stop reason: HOSPADM

## 2019-03-28 RX ORDER — OMEPRAZOLE 20 MG/1
20 CAPSULE, DELAYED RELEASE ORAL DAILY
Status: ON HOLD | COMMUNITY
End: 2019-03-31 | Stop reason: HOSPADM

## 2019-03-28 RX ORDER — LISINOPRIL 10 MG/1
10 TABLET ORAL DAILY
Status: DISCONTINUED | OUTPATIENT
Start: 2019-03-29 | End: 2019-04-01 | Stop reason: HOSPADM

## 2019-03-28 RX ORDER — MORPHINE SULFATE 4 MG/ML
4 INJECTION, SOLUTION INTRAMUSCULAR; INTRAVENOUS ONCE
Status: COMPLETED | OUTPATIENT
Start: 2019-03-28 | End: 2019-03-28

## 2019-03-28 RX ORDER — MIRTAZAPINE 15 MG/1
30 TABLET, FILM COATED ORAL NIGHTLY
Status: DISCONTINUED | OUTPATIENT
Start: 2019-03-28 | End: 2019-04-01 | Stop reason: HOSPADM

## 2019-03-28 RX ORDER — ONDANSETRON 2 MG/ML
4 INJECTION INTRAMUSCULAR; INTRAVENOUS EVERY 6 HOURS PRN
Status: DISCONTINUED | OUTPATIENT
Start: 2019-03-28 | End: 2019-04-01 | Stop reason: HOSPADM

## 2019-03-28 RX ADMIN — CEFTRIAXONE SODIUM 2 G: 2 INJECTION, POWDER, FOR SOLUTION INTRAMUSCULAR; INTRAVENOUS at 22:49

## 2019-03-28 RX ADMIN — METRONIDAZOLE 500 MG: 500 INJECTION, SOLUTION INTRAVENOUS at 23:21

## 2019-03-28 RX ADMIN — Medication 4 MG: at 18:03

## 2019-03-28 RX ADMIN — ONDANSETRON HYDROCHLORIDE 4 MG: 2 SOLUTION INTRAMUSCULAR; INTRAVENOUS at 17:58

## 2019-03-28 RX ADMIN — IOPAMIDOL 110 ML: 755 INJECTION, SOLUTION INTRAVENOUS at 19:14

## 2019-03-28 RX ADMIN — MORPHINE SULFATE 4 MG: 4 INJECTION, SOLUTION INTRAMUSCULAR; INTRAVENOUS at 18:03

## 2019-03-28 ASSESSMENT — ENCOUNTER SYMPTOMS
NAUSEA: 0
DIARRHEA: 0
VOMITING: 0
ABDOMINAL PAIN: 1

## 2019-03-28 ASSESSMENT — PAIN DESCRIPTION - ORIENTATION: ORIENTATION: UPPER

## 2019-03-28 ASSESSMENT — PAIN DESCRIPTION - PAIN TYPE
TYPE: ACUTE PAIN
TYPE: ACUTE PAIN

## 2019-03-28 ASSESSMENT — PAIN SCALES - GENERAL
PAINLEVEL_OUTOF10: 8
PAINLEVEL_OUTOF10: 8
PAINLEVEL_OUTOF10: 5

## 2019-03-28 ASSESSMENT — PAIN DESCRIPTION - FREQUENCY
FREQUENCY: CONTINUOUS
FREQUENCY: CONTINUOUS

## 2019-03-28 ASSESSMENT — PAIN DESCRIPTION - LOCATION
LOCATION: ABDOMEN
LOCATION: ABDOMEN

## 2019-03-28 ASSESSMENT — PAIN DESCRIPTION - DESCRIPTORS
DESCRIPTORS: BURNING
DESCRIPTORS: BURNING

## 2019-03-28 NOTE — ED NOTES
Bed: 09  Expected date:   Expected time:   Means of arrival:   Comments:     Aissatou Castro RN  03/28/19 [id number]
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
## 2019-03-29 ENCOUNTER — ANESTHESIA (OUTPATIENT)
Dept: ENDOSCOPY | Age: 84
DRG: 378 | End: 2019-03-29
Payer: COMMERCIAL

## 2019-03-29 ENCOUNTER — ANESTHESIA EVENT (OUTPATIENT)
Dept: ENDOSCOPY | Age: 84
DRG: 378 | End: 2019-03-29
Payer: COMMERCIAL

## 2019-03-29 VITALS
OXYGEN SATURATION: 99 % | SYSTOLIC BLOOD PRESSURE: 131 MMHG | RESPIRATION RATE: 16 BRPM | DIASTOLIC BLOOD PRESSURE: 60 MMHG

## 2019-03-29 PROBLEM — N18.30 ACUTE RENAL FAILURE SUPERIMPOSED ON STAGE 3 CHRONIC KIDNEY DISEASE (HCC): Status: ACTIVE | Noted: 2019-03-29

## 2019-03-29 PROBLEM — E87.20 LACTIC ACIDOSIS: Status: ACTIVE | Noted: 2019-03-29

## 2019-03-29 PROBLEM — D62 ACUTE BLOOD LOSS ANEMIA: Status: ACTIVE | Noted: 2019-03-29

## 2019-03-29 PROBLEM — N17.9 ACUTE RENAL FAILURE SUPERIMPOSED ON STAGE 3 CHRONIC KIDNEY DISEASE (HCC): Status: ACTIVE | Noted: 2019-03-29

## 2019-03-29 LAB
ANION GAP SERPL CALCULATED.3IONS-SCNC: 12 MMOL/L (ref 7–16)
BUN BLDV-MCNC: 26 MG/DL (ref 8–23)
CALCIUM SERPL-MCNC: 8.3 MG/DL (ref 8.6–10.2)
CHLORIDE BLD-SCNC: 101 MMOL/L (ref 98–107)
CO2: 22 MMOL/L (ref 22–29)
CREAT SERPL-MCNC: 1.2 MG/DL (ref 0.5–1)
GFR AFRICAN AMERICAN: 51
GFR NON-AFRICAN AMERICAN: 42 ML/MIN/1.73
GLUCOSE BLD-MCNC: 151 MG/DL (ref 74–99)
HCT VFR BLD CALC: 26 % (ref 34–48)
HEMOGLOBIN: 8.5 G/DL (ref 11.5–15.5)
MAGNESIUM: 2.2 MG/DL (ref 1.6–2.6)
MCH RBC QN AUTO: 28.5 PG (ref 26–35)
MCHC RBC AUTO-ENTMCNC: 32.7 % (ref 32–34.5)
MCV RBC AUTO: 87.2 FL (ref 80–99.9)
PDW BLD-RTO: 14.6 FL (ref 11.5–15)
PLATELET # BLD: 247 E9/L (ref 130–450)
PMV BLD AUTO: 8.7 FL (ref 7–12)
POTASSIUM REFLEX MAGNESIUM: 4.5 MMOL/L (ref 3.5–5)
RBC # BLD: 2.98 E12/L (ref 3.5–5.5)
SODIUM BLD-SCNC: 135 MMOL/L (ref 132–146)
WBC # BLD: 8.8 E9/L (ref 4.5–11.5)

## 2019-03-29 PROCEDURE — 2709999900 HC NON-CHARGEABLE SUPPLY: Performed by: SURGERY

## 2019-03-29 PROCEDURE — 3700000001 HC ADD 15 MINUTES (ANESTHESIA): Performed by: SURGERY

## 2019-03-29 PROCEDURE — 2580000003 HC RX 258: Performed by: HOSPITALIST

## 2019-03-29 PROCEDURE — 2580000003 HC RX 258: Performed by: SURGERY

## 2019-03-29 PROCEDURE — C9113 INJ PANTOPRAZOLE SODIUM, VIA: HCPCS | Performed by: SURGERY

## 2019-03-29 PROCEDURE — 88342 IMHCHEM/IMCYTCHM 1ST ANTB: CPT

## 2019-03-29 PROCEDURE — 6360000002 HC RX W HCPCS: Performed by: STUDENT IN AN ORGANIZED HEALTH CARE EDUCATION/TRAINING PROGRAM

## 2019-03-29 PROCEDURE — 7100000001 HC PACU RECOVERY - ADDTL 15 MIN: Performed by: SURGERY

## 2019-03-29 PROCEDURE — 83735 ASSAY OF MAGNESIUM: CPT

## 2019-03-29 PROCEDURE — 88305 TISSUE EXAM BY PATHOLOGIST: CPT

## 2019-03-29 PROCEDURE — 0DB68ZX EXCISION OF STOMACH, VIA NATURAL OR ARTIFICIAL OPENING ENDOSCOPIC, DIAGNOSTIC: ICD-10-PCS | Performed by: SURGERY

## 2019-03-29 PROCEDURE — P9016 RBC LEUKOCYTES REDUCED: HCPCS

## 2019-03-29 PROCEDURE — 7100000000 HC PACU RECOVERY - FIRST 15 MIN: Performed by: SURGERY

## 2019-03-29 PROCEDURE — 6370000000 HC RX 637 (ALT 250 FOR IP): Performed by: HOSPITALIST

## 2019-03-29 PROCEDURE — 3609012400 HC EGD TRANSORAL BIOPSY SINGLE/MULTIPLE: Performed by: SURGERY

## 2019-03-29 PROCEDURE — 43239 EGD BIOPSY SINGLE/MULTIPLE: CPT | Performed by: SURGERY

## 2019-03-29 PROCEDURE — 6360000002 HC RX W HCPCS: Performed by: SURGERY

## 2019-03-29 PROCEDURE — 36415 COLL VENOUS BLD VENIPUNCTURE: CPT

## 2019-03-29 PROCEDURE — 36430 TRANSFUSION BLD/BLD COMPNT: CPT

## 2019-03-29 PROCEDURE — 2580000003 HC RX 258: Performed by: STUDENT IN AN ORGANIZED HEALTH CARE EDUCATION/TRAINING PROGRAM

## 2019-03-29 PROCEDURE — 3700000000 HC ANESTHESIA ATTENDED CARE: Performed by: SURGERY

## 2019-03-29 PROCEDURE — 1200000000 HC SEMI PRIVATE

## 2019-03-29 PROCEDURE — 80048 BASIC METABOLIC PNL TOTAL CA: CPT

## 2019-03-29 PROCEDURE — 6360000002 HC RX W HCPCS: Performed by: NURSE ANESTHETIST, CERTIFIED REGISTERED

## 2019-03-29 PROCEDURE — 6370000000 HC RX 637 (ALT 250 FOR IP): Performed by: SURGERY

## 2019-03-29 PROCEDURE — 85027 COMPLETE CBC AUTOMATED: CPT

## 2019-03-29 RX ORDER — SUCRALFATE 1 G/1
1 TABLET ORAL EVERY 6 HOURS SCHEDULED
Status: DISCONTINUED | OUTPATIENT
Start: 2019-03-29 | End: 2019-04-01 | Stop reason: HOSPADM

## 2019-03-29 RX ORDER — LEVOFLOXACIN 5 MG/ML
500 INJECTION, SOLUTION INTRAVENOUS EVERY 24 HOURS
Status: DISCONTINUED | OUTPATIENT
Start: 2019-03-29 | End: 2019-03-30 | Stop reason: SDUPTHER

## 2019-03-29 RX ORDER — ONDANSETRON 2 MG/ML
INJECTION INTRAMUSCULAR; INTRAVENOUS PRN
Status: DISCONTINUED | OUTPATIENT
Start: 2019-03-29 | End: 2019-03-29 | Stop reason: SDUPTHER

## 2019-03-29 RX ORDER — PROPOFOL 10 MG/ML
INJECTION, EMULSION INTRAVENOUS PRN
Status: DISCONTINUED | OUTPATIENT
Start: 2019-03-29 | End: 2019-03-29 | Stop reason: SDUPTHER

## 2019-03-29 RX ORDER — SODIUM CHLORIDE 9 MG/ML
INJECTION, SOLUTION INTRAVENOUS CONTINUOUS
Status: DISCONTINUED | OUTPATIENT
Start: 2019-03-29 | End: 2019-04-01 | Stop reason: HOSPADM

## 2019-03-29 RX ADMIN — PROPOFOL 30 MG: 10 INJECTION, EMULSION INTRAVENOUS at 10:42

## 2019-03-29 RX ADMIN — PROPOFOL 10 MG: 10 INJECTION, EMULSION INTRAVENOUS at 10:46

## 2019-03-29 RX ADMIN — Medication 10 ML: at 08:40

## 2019-03-29 RX ADMIN — SODIUM CHLORIDE: 9 INJECTION, SOLUTION INTRAVENOUS at 10:34

## 2019-03-29 RX ADMIN — PROPOFOL 20 MG: 10 INJECTION, EMULSION INTRAVENOUS at 10:44

## 2019-03-29 RX ADMIN — SUCRALFATE 1 G: 1 TABLET ORAL at 23:49

## 2019-03-29 RX ADMIN — MIRTAZAPINE 30 MG: 15 TABLET, FILM COATED ORAL at 20:46

## 2019-03-29 RX ADMIN — PROPOFOL 30 MG: 10 INJECTION, EMULSION INTRAVENOUS at 10:43

## 2019-03-29 RX ADMIN — LISINOPRIL 10 MG: 10 TABLET ORAL at 08:40

## 2019-03-29 RX ADMIN — PROPOFOL 30 MG: 10 INJECTION, EMULSION INTRAVENOUS at 10:41

## 2019-03-29 RX ADMIN — PROPOFOL 10 MG: 10 INJECTION, EMULSION INTRAVENOUS at 10:45

## 2019-03-29 RX ADMIN — SODIUM CHLORIDE: 9 INJECTION, SOLUTION INTRAVENOUS at 09:01

## 2019-03-29 RX ADMIN — PANTOPRAZOLE SODIUM 8 MG/HR: 40 INJECTION, POWDER, FOR SOLUTION INTRAVENOUS at 20:46

## 2019-03-29 RX ADMIN — ONDANSETRON HYDROCHLORIDE 4 MG: 2 INJECTION, SOLUTION INTRAMUSCULAR; INTRAVENOUS at 10:40

## 2019-03-29 RX ADMIN — SUCRALFATE 1 G: 1 TABLET ORAL at 12:24

## 2019-03-29 RX ADMIN — LEVOFLOXACIN 500 MG: 5 INJECTION, SOLUTION INTRAVENOUS at 14:16

## 2019-03-29 RX ADMIN — SUCRALFATE 1 G: 1 TABLET ORAL at 17:08

## 2019-03-29 ASSESSMENT — PAIN SCALES - GENERAL
PAINLEVEL_OUTOF10: 0
PAINLEVEL_OUTOF10: 4
PAINLEVEL_OUTOF10: 0
PAINLEVEL_OUTOF10: 4
PAINLEVEL_OUTOF10: 0

## 2019-03-29 ASSESSMENT — PAIN DESCRIPTION - FREQUENCY
FREQUENCY: CONTINUOUS
FREQUENCY: CONTINUOUS

## 2019-03-29 ASSESSMENT — PAIN DESCRIPTION - ONSET
ONSET: ON-GOING
ONSET: ON-GOING

## 2019-03-29 ASSESSMENT — PAIN DESCRIPTION - ORIENTATION
ORIENTATION: MID;UPPER
ORIENTATION: UPPER;MID;RIGHT

## 2019-03-29 ASSESSMENT — PAIN DESCRIPTION - LOCATION
LOCATION: ABDOMEN
LOCATION: ABDOMEN

## 2019-03-29 ASSESSMENT — PAIN DESCRIPTION - DESCRIPTORS
DESCRIPTORS: CRAMPING;DISCOMFORT;SHARP
DESCRIPTORS: DISCOMFORT;CRAMPING;SHARP

## 2019-03-29 ASSESSMENT — PAIN DESCRIPTION - PAIN TYPE
TYPE: ACUTE PAIN
TYPE: ACUTE PAIN

## 2019-03-29 ASSESSMENT — PAIN - FUNCTIONAL ASSESSMENT: PAIN_FUNCTIONAL_ASSESSMENT: PREVENTS OR INTERFERES SOME ACTIVE ACTIVITIES AND ADLS

## 2019-03-29 NOTE — CARE COORDINATION
DISCHARGE PLANNING:     Attempted to speak with patient, however patient was not available at this time. I will continue to follow.

## 2019-03-29 NOTE — CARE COORDINATION
DISCHARGE PLANNING:    Spoke with patient and her daughter Trey Mckeon at the bedside regarding transition of care upon discharge. Pt lives in 2nd floor apartment with an elevator. Pt plan is to discharge home with her [place] from [facility] and no additional needs. She has JAMES history with [facility] and [facility] history with [facility] Pt PCP is [facility]. She uses [facility] in [facility]. Her daughter Trey Mckeon will be providing transportation when discharged. I encouraged them to follow up with me if they have any further questions. I will continue to follow.
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
## 2019-03-29 NOTE — OP NOTE
instrument was withdrawn, the esophagus was inspected and showed no abnormalities. The instrument was then withdrawn from the patient.      THE PATIENT TOLERATED THE PROCEDURE WELL    PLAN:  C/w PPI gtt  carafate  Check gastrin    Paxton Corona MD  3/29/2019  10:53 AM
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
## 2019-03-29 NOTE — PROGRESS NOTES
Physical Therapy    Date: 3/29/2019       Patient Name: Prateek Smith  : 1930      MRN: [id number]    PT order received. Chart has been reviewed. PT evaluation will be on hold due to pateitn being off floor for testing. Will continue to follow and complete evaluation at later time.      Zakia Cramer, PT

## 2019-03-29 NOTE — PROGRESS NOTES
Occupational Therapy          OT consult received and appreciated. Chart reviewed. Will hold evaluation due to patient off unit for testing . Will evaluate at a later time. Thank you. Lilia Key.  Padmini Murillo

## 2019-03-30 LAB
ANION GAP SERPL CALCULATED.3IONS-SCNC: 10 MMOL/L (ref 7–16)
BUN BLDV-MCNC: 21 MG/DL (ref 8–23)
CALCIUM SERPL-MCNC: 7.6 MG/DL (ref 8.6–10.2)
CHLORIDE BLD-SCNC: 111 MMOL/L (ref 98–107)
CO2: 20 MMOL/L (ref 22–29)
CREAT SERPL-MCNC: 1.3 MG/DL (ref 0.5–1)
GFR AFRICAN AMERICAN: 47
GFR NON-AFRICAN AMERICAN: 39 ML/MIN/1.73
GLUCOSE BLD-MCNC: 88 MG/DL (ref 74–99)
HCT VFR BLD CALC: 23.1 % (ref 34–48)
HCT VFR BLD CALC: 23.5 % (ref 34–48)
HEMOGLOBIN: 7.1 G/DL (ref 11.5–15.5)
HEMOGLOBIN: 7.3 G/DL (ref 11.5–15.5)
MCH RBC QN AUTO: 28.2 PG (ref 26–35)
MCHC RBC AUTO-ENTMCNC: 31.1 % (ref 32–34.5)
MCV RBC AUTO: 90.7 FL (ref 80–99.9)
PDW BLD-RTO: 14.8 FL (ref 11.5–15)
PLATELET # BLD: 225 E9/L (ref 130–450)
PMV BLD AUTO: 9.5 FL (ref 7–12)
POTASSIUM SERPL-SCNC: 4.4 MMOL/L (ref 3.5–5)
RBC # BLD: 2.59 E12/L (ref 3.5–5.5)
SODIUM BLD-SCNC: 141 MMOL/L (ref 132–146)
URINE CULTURE, ROUTINE: NORMAL
WBC # BLD: 6.8 E9/L (ref 4.5–11.5)

## 2019-03-30 PROCEDURE — 2580000003 HC RX 258: Performed by: SURGERY

## 2019-03-30 PROCEDURE — C9113 INJ PANTOPRAZOLE SODIUM, VIA: HCPCS | Performed by: SURGERY

## 2019-03-30 PROCEDURE — 6370000000 HC RX 637 (ALT 250 FOR IP): Performed by: SURGERY

## 2019-03-30 PROCEDURE — 1200000000 HC SEMI PRIVATE

## 2019-03-30 PROCEDURE — 36415 COLL VENOUS BLD VENIPUNCTURE: CPT

## 2019-03-30 PROCEDURE — 85014 HEMATOCRIT: CPT

## 2019-03-30 PROCEDURE — 85027 COMPLETE CBC AUTOMATED: CPT

## 2019-03-30 PROCEDURE — 80048 BASIC METABOLIC PNL TOTAL CA: CPT

## 2019-03-30 PROCEDURE — 85018 HEMOGLOBIN: CPT

## 2019-03-30 PROCEDURE — 6360000002 HC RX W HCPCS: Performed by: SURGERY

## 2019-03-30 PROCEDURE — 82941 ASSAY OF GASTRIN: CPT

## 2019-03-30 PROCEDURE — 99232 SBSQ HOSP IP/OBS MODERATE 35: CPT | Performed by: SURGERY

## 2019-03-30 RX ORDER — SUCRALFATE 1 G/1
1 TABLET ORAL 4 TIMES DAILY
Qty: 120 TABLET | Refills: 1 | Status: SHIPPED | OUTPATIENT
Start: 2019-03-30 | End: 2019-05-31 | Stop reason: SDUPTHER

## 2019-03-30 RX ORDER — LEVOFLOXACIN 5 MG/ML
500 INJECTION, SOLUTION INTRAVENOUS
Status: DISCONTINUED | OUTPATIENT
Start: 2019-03-31 | End: 2019-04-01 | Stop reason: HOSPADM

## 2019-03-30 RX ADMIN — LISINOPRIL 10 MG: 10 TABLET ORAL at 09:22

## 2019-03-30 RX ADMIN — Medication 10 ML: at 20:41

## 2019-03-30 RX ADMIN — SODIUM CHLORIDE: 9 INJECTION, SOLUTION INTRAVENOUS at 08:00

## 2019-03-30 RX ADMIN — PANTOPRAZOLE SODIUM 8 MG/HR: 40 INJECTION, POWDER, FOR SOLUTION INTRAVENOUS at 17:23

## 2019-03-30 RX ADMIN — MIRTAZAPINE 30 MG: 15 TABLET, FILM COATED ORAL at 20:39

## 2019-03-30 RX ADMIN — SUCRALFATE 1 G: 1 TABLET ORAL at 17:22

## 2019-03-30 RX ADMIN — SUCRALFATE 1 G: 1 TABLET ORAL at 05:58

## 2019-03-30 RX ADMIN — SUCRALFATE 1 G: 1 TABLET ORAL at 12:30

## 2019-03-30 ASSESSMENT — PAIN SCALES - GENERAL
PAINLEVEL_OUTOF10: 0
PAINLEVEL_OUTOF10: 0

## 2019-03-30 NOTE — PROGRESS NOTES
Hospitalist Progress Note      PCP: Regan Mcgill, APRN - CNP    Date of Admission: 3/28/2019  Days in the hospital: 2    Chief Complaint: abd pain     Hospital Course:     Admitted for pain and found to have duodenitis. Surgery was consulted. EGD-  EGD - large duodenal ulcers, no active bleeding; large hiatal hernia with ulcers, no active bleeding; esophagitis LA grade A   Also found to have UTI. Urine culture mixed fer. Subjective  Patient seen and examined at bedside. NAD, feels a bit better. Patient denies any fevers, chills, chest pain, shortness of breath, nausea, vomiting. Medications:  Reviewed    Infusion Medications    sodium chloride 50 mL/hr at 03/30/19 0557    pantoprozole (PROTONIX) infusion 8 mg/hr (03/30/19 0557)     Scheduled Medications    sucralfate  1 g Oral 4 times per day    levofloxacin  500 mg Intravenous Q24H    lisinopril  10 mg Oral Daily    mirtazapine  30 mg Oral Nightly    sodium chloride  250 mL Intravenous Once    sodium chloride flush  10 mL Intravenous 2 times per day     PRN Meds: ondansetron, sodium chloride flush, magnesium hydroxide      Intake/Output Summary (Last 24 hours) at 3/30/2019 0735  Last data filed at 3/30/2019 8992  Gross per 24 hour   Intake 2089 ml   Output --   Net 2089 ml       Exam:    BP (!) 119/57   Pulse 76   Temp 99 °F (37.2 °C) (Temporal)   Resp 16   Ht 4' 11\" (1.499 m)   Wt 109 lb 5.6 oz (49.6 kg)   SpO2 96%   BMI 22.09 kg/m²     General appearance: No apparent distress, appears stated age and cooperative. HEENT: Normal cephalic, atraumatic without obvious deformity. Pupils equal, round, and reactive to light. Extra ocular muscles intact. Conjunctivae/corneas clear. Neck: Supple, with full range of motion. No jugular venous distention. Trachea midline. Respiratory:  Normal respiratory effort. Clear to auscultation, bilaterally without Rales/Wheezes/Rhonchi.   Cardiovascular: Regular rate and rhythm with normal S1/S2 without murmurs, rubs or gallops. Brisk capillary refill. 2+ lower extremity pulses (dorsalis pedis). Abdomen: Soft, tender, non-distended with normal bowel sounds , no rebound or guarding. Musculoskeletal: No clubbing, cyanosis or edema bilaterally. Full range of motion without deformity. Brisk capillary refill. 2+ lower extremity pulses (dorsalis pedis). Skin: Normal skin color. No rashes or lesions. Neurologic:  Neurovascularly intact without any focal sensory/motor deficits. Cranial nerves: II-XII intact, grossly non-focal.  Psychiatric: Alert and oriented, thought content appropriate, normal insight        Labs:   Recent Labs     03/28/19  1730 03/28/19  2335 03/29/19  0541   WBC 10.2  --  8.8   HGB 6.1* 6.3* 8.5*   HCT 20.4* 21.0* 26.0*     --  247     Recent Labs     03/28/19  1730 03/29/19  0541    135   K 4.7 4.5   CL 99 101   CO2 21* 22   BUN 31* 26*   CREATININE 1.2* 1.2*   CALCIUM 8.9 8.3*     Recent Labs     03/28/19  1730   AST 21   ALT 10   BILITOT <0.2   ALKPHOS 52     No results for input(s): INR in the last 72 hours. Recent Labs     03/28/19  1730   TROPONINI <0.01       Imaging:  CT ABDOMEN PELVIS W IV CONTRAST Additional Contrast? None   Final Result      1. Thickened wall of duodenum with adjacent inflammatory changes could   suggest duodenitis. Clinical correlation recommended. 2. Multiple gallstones layering within gallbladder. 3. Diverticulosis without evidence of acute diverticulitis. 4. Large hiatal hernia.             Assessment/Plan:  Active Hospital Problems    Diagnosis Date Noted    Hypertension [I10] 07/14/2013     Priority: High    Acute blood loss anemia [D62] 03/29/2019    Acute renal failure superimposed on stage 3 chronic kidney disease ([facility] [address] [N17.9, N18.3] 03/29/2019    Lactic acidosis [E87.2] 03/29/2019    Abdominal pain [R10.9] 03/28/2019     ABD pain secondary to duodenitis  - surgery on board - EGD - large duodenal ulcers, no active bleeding; large hiatal hernia with ulcers, no active bleeding; esophagitis LA grade A  -  IVF, PPI, pain control     HTN  - resume home meds     Normocytic anemia  - trend and transfuse PRN  - s/p 1U PRBC   - monitor vitals         · DVT Prophylaxis  ? scd  · Diet  ? Diet NPO Effective Now Exceptions are: Sips with Meds  · Code Status  ? Full Code  · PT/OT Eval Status  ? eval/treat  · Disposition  ? inpt    ·          Alecia Kirk MD  [facility]  Please contact me through perfect serve    NOTE: This report was transcribed using voice recognition software. Every effort was made to ensure accuracy; however, inadvertent computerized transcription errors may be present.

## 2019-03-30 NOTE — PROGRESS NOTES
[facility]  PROGRESS NOTE  ATTENDING NOTE    S:  Feeling better today, no N/V.   I discussed her EGD findings with her    O:  /60   Pulse 67   Temp 98 °F (36.7 °C) (Temporal)   Resp 16   Ht 4' 11\" (1.499 m)   Wt 109 lb 5.6 oz (49.6 kg)   SpO2 96%   BMI 22.09 kg/m²   Gen:  NAD  Abd;  Soft, ND, mild epigastric tenderness    ASSESSMENT/PLAN:  Many gastric and duodenal ulcers  --c/w clears  --c/w carafate  --c/w PPI gtt until tomorrow then transition to PO  Acute blood loss anemia d/t ulcers  --monitor H/H daily    Gloria Watson MD, MSc, FACS  3/30/2019  12:36 PM

## 2019-03-31 LAB
BASOPHILS ABSOLUTE: 0.02 E9/L (ref 0–0.2)
BASOPHILS RELATIVE PERCENT: 0.3 % (ref 0–2)
EOSINOPHILS ABSOLUTE: 0.36 E9/L (ref 0.05–0.5)
EOSINOPHILS RELATIVE PERCENT: 5.7 % (ref 0–6)
HCT VFR BLD CALC: 22.7 % (ref 34–48)
HCT VFR BLD CALC: 23.7 % (ref 34–48)
HCT VFR BLD CALC: 26.7 % (ref 34–48)
HEMOGLOBIN: 7.1 G/DL (ref 11.5–15.5)
HEMOGLOBIN: 7.2 G/DL (ref 11.5–15.5)
HEMOGLOBIN: 7.9 G/DL (ref 11.5–15.5)
IMMATURE GRANULOCYTES #: 0.02 E9/L
IMMATURE GRANULOCYTES %: 0.3 % (ref 0–5)
LYMPHOCYTES ABSOLUTE: 2.62 E9/L (ref 1.5–4)
LYMPHOCYTES RELATIVE PERCENT: 41.5 % (ref 20–42)
MCH RBC QN AUTO: 28.3 PG (ref 26–35)
MCHC RBC AUTO-ENTMCNC: 31.3 % (ref 32–34.5)
MCV RBC AUTO: 90.4 FL (ref 80–99.9)
MONOCYTES ABSOLUTE: 0.69 E9/L (ref 0.1–0.95)
MONOCYTES RELATIVE PERCENT: 10.9 % (ref 2–12)
NEUTROPHILS ABSOLUTE: 2.61 E9/L (ref 1.8–7.3)
NEUTROPHILS RELATIVE PERCENT: 41.3 % (ref 43–80)
PDW BLD-RTO: 15.1 FL (ref 11.5–15)
PLATELET # BLD: 211 E9/L (ref 130–450)
PMV BLD AUTO: 9.3 FL (ref 7–12)
RBC # BLD: 2.51 E12/L (ref 3.5–5.5)
WBC # BLD: 6.3 E9/L (ref 4.5–11.5)

## 2019-03-31 PROCEDURE — 6370000000 HC RX 637 (ALT 250 FOR IP): Performed by: SURGERY

## 2019-03-31 PROCEDURE — 1200000000 HC SEMI PRIVATE

## 2019-03-31 PROCEDURE — 85018 HEMOGLOBIN: CPT

## 2019-03-31 PROCEDURE — 2580000003 HC RX 258: Performed by: SURGERY

## 2019-03-31 PROCEDURE — 97530 THERAPEUTIC ACTIVITIES: CPT

## 2019-03-31 PROCEDURE — 6360000002 HC RX W HCPCS: Performed by: STUDENT IN AN ORGANIZED HEALTH CARE EDUCATION/TRAINING PROGRAM

## 2019-03-31 PROCEDURE — 36415 COLL VENOUS BLD VENIPUNCTURE: CPT

## 2019-03-31 PROCEDURE — 97165 OT EVAL LOW COMPLEX 30 MIN: CPT

## 2019-03-31 PROCEDURE — 85025 COMPLETE CBC W/AUTO DIFF WBC: CPT

## 2019-03-31 PROCEDURE — 97161 PT EVAL LOW COMPLEX 20 MIN: CPT

## 2019-03-31 PROCEDURE — 6360000002 HC RX W HCPCS: Performed by: SURGERY

## 2019-03-31 PROCEDURE — 85014 HEMATOCRIT: CPT

## 2019-03-31 PROCEDURE — 99232 SBSQ HOSP IP/OBS MODERATE 35: CPT | Performed by: SURGERY

## 2019-03-31 PROCEDURE — C9113 INJ PANTOPRAZOLE SODIUM, VIA: HCPCS | Performed by: SURGERY

## 2019-03-31 RX ORDER — PANTOPRAZOLE SODIUM 40 MG/1
40 TABLET, DELAYED RELEASE ORAL
Qty: 60 TABLET | Refills: 0 | Status: SHIPPED | OUTPATIENT
Start: 2019-03-31 | End: 2019-04-04 | Stop reason: SDUPTHER

## 2019-03-31 RX ORDER — ASCORBIC ACID 500 MG
500 TABLET ORAL DAILY
COMMUNITY
End: 2020-01-01

## 2019-03-31 RX ORDER — PANTOPRAZOLE SODIUM 40 MG/1
40 TABLET, DELAYED RELEASE ORAL
Status: DISCONTINUED | OUTPATIENT
Start: 2019-03-31 | End: 2019-04-01 | Stop reason: HOSPADM

## 2019-03-31 RX ORDER — CALCIUM CARBONATE 500(1250)
500 TABLET ORAL DAILY
COMMUNITY
End: 2020-01-01

## 2019-03-31 RX ADMIN — SUCRALFATE 1 G: 1 TABLET ORAL at 00:12

## 2019-03-31 RX ADMIN — LEVOFLOXACIN 500 MG: 5 INJECTION, SOLUTION INTRAVENOUS at 13:25

## 2019-03-31 RX ADMIN — LISINOPRIL 10 MG: 10 TABLET ORAL at 07:50

## 2019-03-31 RX ADMIN — SUCRALFATE 1 G: 1 TABLET ORAL at 23:24

## 2019-03-31 RX ADMIN — MIRTAZAPINE 30 MG: 15 TABLET, FILM COATED ORAL at 20:56

## 2019-03-31 RX ADMIN — SUCRALFATE 1 G: 1 TABLET ORAL at 06:11

## 2019-03-31 RX ADMIN — PANTOPRAZOLE SODIUM 8 MG/HR: 40 INJECTION, POWDER, FOR SOLUTION INTRAVENOUS at 05:39

## 2019-03-31 RX ADMIN — SODIUM CHLORIDE: 9 INJECTION, SOLUTION INTRAVENOUS at 20:56

## 2019-03-31 RX ADMIN — PANTOPRAZOLE SODIUM 40 MG: 40 TABLET, DELAYED RELEASE ORAL at 16:45

## 2019-03-31 RX ADMIN — SUCRALFATE 1 G: 1 TABLET ORAL at 10:54

## 2019-03-31 RX ADMIN — PANTOPRAZOLE SODIUM 40 MG: 40 TABLET, DELAYED RELEASE ORAL at 08:29

## 2019-03-31 RX ADMIN — SUCRALFATE 1 G: 1 TABLET ORAL at 16:45

## 2019-03-31 RX ADMIN — SODIUM CHLORIDE: 9 INJECTION, SOLUTION INTRAVENOUS at 02:40

## 2019-03-31 ASSESSMENT — PAIN SCALES - GENERAL
PAINLEVEL_OUTOF10: 0

## 2019-03-31 NOTE — PROGRESS NOTES
Hospitalist Progress Note      PCP: Tory Cervantes, APRN - CNP    Date of Admission: 3/28/2019  Days in the hospital: 3    Chief Complaint: abd pain     Hospital Course:     Admitted for pain and found to have duodenitis. Surgery was consulted. EGD-  EGD - large duodenal ulcers, no active bleeding; large hiatal hernia with ulcers, no active bleeding; esophagitis LA grade A   Also found to have UTI. Urine culture mixed fer. Subjective  Patient seen and examined at bedside. NAD, feels fine, has no complaints and no bleeding noted. Patient denies any fevers, chills, chest pain, shortness of breath, nausea, vomiting. Medications:  Reviewed    Infusion Medications    sodium chloride 50 mL/hr at 03/31/19 0240     Scheduled Medications    pantoprazole  40 mg Oral BID AC    levofloxacin  500 mg Intravenous Q48H    sucralfate  1 g Oral 4 times per day    lisinopril  10 mg Oral Daily    mirtazapine  30 mg Oral Nightly    sodium chloride  250 mL Intravenous Once    sodium chloride flush  10 mL Intravenous 2 times per day     PRN Meds: ondansetron, sodium chloride flush, magnesium hydroxide      Intake/Output Summary (Last 24 hours) at 3/31/2019 0843  Last data filed at 3/31/2019 0649  Gross per 24 hour   Intake 2292 ml   Output --   Net 2292 ml       Exam:    /75   Pulse 68   Temp 99 °F (37.2 °C) (Temporal)   Resp 16   Ht 4' 11\" (1.499 m)   Wt 109 lb 5.6 oz (49.6 kg)   SpO2 95%   BMI 22.09 kg/m²     General appearance: No apparent distress, appears stated age and cooperative. HEENT: Normal cephalic, atraumatic without obvious deformity. Pupils equal, round, and reactive to light. Extra ocular muscles intact. Conjunctivae/corneas clear. Neck: Supple, with full range of motion. No jugular venous distention. Trachea midline. Respiratory:  Normal respiratory effort. Clear to auscultation, bilaterally without Rales/Wheezes/Rhonchi.   Cardiovascular: Regular rate and rhythm with normal S1/S2 secondary to duodenitis  - surgery on board - EGD - large duodenal ulcers, no active bleeding; large hiatal hernia with ulcers, no active bleeding; esophagitis LA grade A  -  IVF, PPI- PO now, pain control     HTN  - resume home meds     Normocytic anemia  - trend and transfuse PRN  - s/p 1U PRBC   - monitor vitals   H/H q12        · DVT Prophylaxis  ? scd  · Diet  ? Diet NPO Effective Now Exceptions are: Sips with Meds  · Code Status  ? Full Code  · PT/OT Eval Status  ? eval/treat  · Disposition  ? inpt    ·          Filipe Lugo MD  [facility]  Please contact me through perfect serve    NOTE: This report was transcribed using voice recognition software. Every effort was made to ensure accuracy; however, inadvertent computerized transcription errors may be present.

## 2019-03-31 NOTE — PROGRESS NOTES
Nutrition Assessment    Type and Reason for Visit: Initial, Consult    Nutrition Recommendations: Continue current diet, Start Ensure BID    Nutrition Assessment: Pt nutritionally compromised w/ poor PO intake d/t current gastric ulcers. Per family pt has been steadily losing wt over last three years and they feel she is limiting calories in order to \"stay thin\" though pt reports she consumes Ensure BID at home in an effort to maintain weight. Will provide ONS and monitor    Malnutrition Assessment:  · Malnutrition Status: At risk for malnutrition  · Context: Acute illness or injury  · Findings of the 6 clinical characteristics of malnutrition (Minimum of 2 out of 6 clinical characteristics is required to make the diagnosis of moderate or severe Protein Calorie Malnutrition based on AND/ASPEN Guidelines):  1. Energy Intake-Less than or equal to 50% of estimated energy requirement, (x3 days since adm)    2. Weight Loss-No significant weight loss, in 1 year  3. Fat Loss-No significant subcutaneous fat loss    4. Muscle Loss-Mild muscle mass loss, Clavicles (pectoralis and deltoids)  5. Fluid Accumulation-No significant fluid accumulation    6.   Strength-Not measured    Nutrition Risk Level: Low    Nutrient Needs:  · Estimated Daily Total Kcal: ~1000( x 1.2 SF)  · Estimated Daily Protein (g): 60-70(1.2-1.4 gm/kg CBW)  · Estimated Daily Total Fluid (ml/day): >1000    Nutrition Diagnosis:   · Problem: Inadequate oral intake  · Etiology: related to Insufficient energy/nutrient consumption     Signs and symptoms:  as evidenced by Intake 25-50%    Objective Information:  · Nutrition-Focused Physical Findings: pt slightly confused w/ family at bedside, deaf L side, abd tenderness, active BS, no noted edema, +I/Os noted though no OP documented  · Wound Type: Skin Tears  · Current Nutrition Therapies:  · Oral Diet Orders: Peptic Ulcer   · Oral Diet intake: 26-50%  · Oral Nutrition Supplement (ONS) Orders: None  · Anthropometric Measures:  · Ht: 4' 11\" (149.9 cm)   · Current Body Wt: 109 lb (49.4 kg)(bed scale 3/31)  · Admission Body Wt: 105 lb (47.6 kg)(bed scale 3/29)  · Usual Body Wt: 108 lb (49 kg)(actual per EMR 04/2018, noted wt 99# 10/2018)  · % Weight Change: overall insignificant 2.8% wt loss x ~1 year, noted family requested consult as they are concerned about a reported steady wt loss of approximately 13# over last ~3 years  · Ideal Body Wt: 98 lb (44.5 kg), % Ideal Body 107%(using adm wt)  · BMI Classification: BMI 18.5 - 24.9 Normal Weight    Nutrition Interventions:   Continued Inpatient Monitoring, Education Initiated, Coordination of Care(Reviewed ONS options/preferences & home usage, answered family questions about peptic ulcer diet)    Nutrition Evaluation:   · Evaluation: Goals set   · Goals: Consume >50% meals/ONS    · Monitoring: Meal Intake, Supplement Intake, Diet Tolerance, Skin Integrity, Wound Healing, I&O, Mental Status/Confusion, Weight, Pertinent Labs, Monitor Bowel Function      Electronically signed by Cristin Mackay, MS, RD, LD on 3/31/19 at 2:47 PM    Contact Number: [phone]

## 2019-03-31 NOTE — PROGRESS NOTES
[facility]  PROGRESS NOTE  ATTENDING NOTE    S:  Doing well    O:  /75   Pulse 68   Temp 99 °F (37.2 °C) (Temporal)   Resp 16   Ht 4' 11\" (1.499 m)   Wt 109 lb 5.6 oz (49.6 kg)   SpO2 95%   BMI 22.09 kg/m²   Gen:  NAD  Abd;  Soft, ND, NT    ASSESSMENT/PLAN:  Many gastric and duodenal ulcers  --ADAT to PUD  --Protonix BID  --Carafate  --d/c protonix gtt  Acute blood loss anemia d/t ulcers  --monitor H/H daily    D/c home 4/1 if tolerating diet    Shaun Wynn MD, MSc, FACS  3/31/2019  1:39 PM

## 2019-03-31 NOTE — PLAN OF CARE
Problem: Pain:  Goal: Control of acute pain  Description  Control of acute pain  Outcome: Met This Shift     Problem: Falls - Risk of:  Goal: Will remain free from falls  Description  Will remain free from falls  Outcome: Met This Shift  Goal: Absence of physical injury  Description  Absence of physical injury  Outcome: Met This Shift

## 2019-03-31 NOTE — PLAN OF CARE
Problem: Inadequate oral food/beverage intake (NI-2.1)  Goal: Food and/or Nutrient Delivery  Start Ensure BID  Description  Individualized approach for food/nutrient provision.   Outcome: Met This Shift

## 2019-03-31 NOTE — PROGRESS NOTES
OCCUPATIONAL THERAPY INITIAL EVALUATION      Date:3/31/2019  Patient Name: Larinda Najjar  MRN: [id number]  : 1930  Room: [address] [place], [place] [address]      Evaluating OT: Darius Bear OTR/L #[id number]    AM-PAC Daily Activity Raw Score:     Recommended Adaptive Equipment: To be further assessed      Diagnosis:    1. Epigastric pain    2. Anemia, unspecified type        Pertinent Medical History: dementia, osteoporosis     Precautions:  Falls, hx back sx, deaf L ear      Home Living: Pt lives alone in a 2 floor apt with level entry and elevator access. Pt uses steps for exercise   Bathroom setup: tub shower with bath chair  Equipment owned: ww, cane, tub chair, BSC  Prior Level of Function: Modified [place]  with ADLs , Modified [place]  with IADLs; using no AD for ambulation. Driving: no    Pain Level: 0/10   Cognition: A&O: 3/4; Follows 2 step directions   Memory:  good    Sequencing:  fair    Problem solving:  fair    Judgement/safety:  fair      Functional Assessment:   Initial Eval Status  Date: 3/31/19 Treatment Status  Date: Short Term Goals  Treatment frequency: PRN    Feeding Independent       Grooming Stand by Assist   Modified [place]    UB Dressing Stand by Assist   Modified [place]    LB Dressing Minimal Assist   Modified [place]    Bathing Minimal Assist  Supervision    Toileting Stand by Assist   Modified [place]    Bed Mobility  Supine to sit: Supervision   Sit to supine: Supervision   Supine to sit:  Independent   Sit to supine: Independent    Functional Transfers Sit to stand: Stand by Assist   Stand to sit: Stand by Assist   Stand pivot: Stand by Assist   Independent    Functional Mobility SBA with no AD  Functional distance with 1 LOB and min A to recover  indep with no AD  Functional distance for indep ADL/IADLs   Balance Sitting:     Static:  wfl    Dynamic:wfl  Standing: SBA/Adiel     Activity Tolerance Fair+     Visual/  Perceptual Glasses: yes                Hand dominance: L  UE ROM: RUE:  WFL  LUE:  WFL  Strength: RUE: grossly 4/5 LUE: grossly 4/5   Strength: B WFL  Fine Motor Coordination:  B WFL    Hearing: deaf L ear  Sensation:  No c/o numbness or tingling  Tone:  WFL  Edema: None noted                            Comments/Treatment: Upon arrival, patient in bed. Therapist educated and facilitated pt on techniques to increase safety and independence with bed mobility, ADLs,  functional transfers, and functional mobility. Pt declined use of AD for balance stating she has a rail to hold at her apt. Pt  education on Adaptive technique/equipment to improve independence  and conserve energy during ADLs. Pt demonstrating fair understanding of education/techniques,  requiring additional training / education to improve safety and indep with ADLs. At end of session, patient in bed with call light and phone within reach, all lines and tubes intact. Pt would benefit from continued OT to increase functional independence and quality of life. Eval Complexity: Low    Assessment of current deficits   Functional mobility ? ADLs ? Strength ? Cognition ? Functional transfers  ? IADLs ? Safety Awareness ? Endurance ? Fine Motor Coordination ? Balance ? Vision/perception ? Sensation ? Gross Motor Coordination ? ROM ? Delirium ? Motor Control ? Plan of Care:   ADL retraining ? Equipment needs ? Neuromuscular re-education ? Energy Conservation Techniques ? Functional Transfer training ? Patient and/or Family Education ? Functional Mobility training ? Environmental Modifications ? Cognitive re-training ? Compensatory techniques for ADLs ? Splinting Needs ? Positioning to improve overall function ? Therapeutic Activity ? Therapeutic Exercise  ? Visual/Perceptual: ?    Delirium prevention/treatment  ?    Other:  ?    Rehab Potential: Good for established goals    Patient / Family Goal:  Not stated     Patient and/or family were instructed diagnosis, prognosis/goals and plan of care. Demonstrated fair understanding. ? Malnutrition indicators have been identified and nursing has been notified to ensure a dietitian consult is ordered.      Low Evaluation + 10 timed treatment minutes  Treatment Time in: 9:05   Treatment Time out: [address] [facility], OTR/L #[id number]

## 2019-03-31 NOTE — PROGRESS NOTES
Physical Therapy  Initial Assessment     Name: Sabine Guardado  : 1930  MRN: [id number]    Date of Service: 3/31/2019    Evaluating PT:  Samuel Zuniga, PT, DPT, [id number]    Room #:  1882/6890-D  Diagnosis:  Abdominal pain  Precautions:  Falls, hx back sx, deaf L ear   Procedures: none  Equipment recommendations:  None     Pt lives alone in a 2nd floor apartment with level antry and elevator to second floor. Pt reports she still uses the stairs sometimes to exercise. Pt ambulated with no AD PTA -- does own a cane. Pt independent for ADL performance. Initial Evaluation  Date: 3/31/19 Treatment Short Term/ Long Term   Goals   AM-PAC 6 Clicks /     Was pt agreeable to Eval/treatment? Yes      Does pt have pain? Denies      Bed Mobility  Rolling: NT  Supine to sit: supervision  Sit to supine: supervision  Scooting: NT  Independent   Transfers Sit to stand: SBA  Stand to sit: SBA  Stand pivot: NT  Independent   Ambulation    150 feet with no AD SBA  -x1 LOB [place] to recover    >250 feet with no AD independent   Stair negotiation: ascended and descended 4 steps 2 rails, SBA  >4 steps with 1 rail mod I   ROM BUE:  See OT eval  BLE:  WFL     Strength BUE:  See OT eval  BLE grossly:  5/5  5/5   Balance Sitting EOB:  Independent  Dynamic Standing:  SBA  Sitting EOB:  Independent  Dynamic Standing:  Independent     -Pt is A & O x 3  -Sensation:  Pt denies numbness and tingling to extremities  -Edema:  Unremarkable     Patient education  Pt educated on safety, sequencing during transfers, and role of PT     Patient response to education:   Pt verbalized understanding Pt demonstrated skill Pt requires further education in this area   Yes  Yes  Reinforce      Assessment/Comments  ---Pt supine in bed upon entry to room. Agreeable to PT evaluation with OT collaboration. Pt ambulated with no AD -- did experience 1 minor LOB which she was given hands on assist to steady herself.  Educated pt on using cane at home which

## 2019-04-01 VITALS
WEIGHT: 109.7 LBS | HEART RATE: 83 BPM | DIASTOLIC BLOOD PRESSURE: 67 MMHG | HEIGHT: 59 IN | BODY MASS INDEX: 22.12 KG/M2 | OXYGEN SATURATION: 98 % | RESPIRATION RATE: 16 BRPM | TEMPERATURE: 98.8 F | SYSTOLIC BLOOD PRESSURE: 161 MMHG

## 2019-04-01 LAB
BASOPHILS ABSOLUTE: 0.01 E9/L (ref 0–0.2)
BASOPHILS RELATIVE PERCENT: 0.1 % (ref 0–2)
BLOOD BANK DISPENSE STATUS: NORMAL
BLOOD BANK DISPENSE STATUS: NORMAL
BLOOD BANK PRODUCT CODE: NORMAL
BLOOD BANK PRODUCT CODE: NORMAL
BPU ID: NORMAL
BPU ID: NORMAL
DESCRIPTION BLOOD BANK: NORMAL
DESCRIPTION BLOOD BANK: NORMAL
EOSINOPHILS ABSOLUTE: 0.26 E9/L (ref 0.05–0.5)
EOSINOPHILS RELATIVE PERCENT: 2.7 % (ref 0–6)
GASTRIN: 14 PG/ML (ref 0–100)
HCT VFR BLD CALC: 24.6 % (ref 34–48)
HCT VFR BLD CALC: 27 % (ref 34–48)
HEMOGLOBIN: 7.6 G/DL (ref 11.5–15.5)
HEMOGLOBIN: 8.2 G/DL (ref 11.5–15.5)
IMMATURE GRANULOCYTES #: 0.03 E9/L
IMMATURE GRANULOCYTES %: 0.3 % (ref 0–5)
LYMPHOCYTES ABSOLUTE: 2.13 E9/L (ref 1.5–4)
LYMPHOCYTES RELATIVE PERCENT: 22.2 % (ref 20–42)
MCH RBC QN AUTO: 27.7 PG (ref 26–35)
MCHC RBC AUTO-ENTMCNC: 30.9 % (ref 32–34.5)
MCV RBC AUTO: 89.8 FL (ref 80–99.9)
MONOCYTES ABSOLUTE: 0.97 E9/L (ref 0.1–0.95)
MONOCYTES RELATIVE PERCENT: 10.1 % (ref 2–12)
NEUTROPHILS ABSOLUTE: 6.19 E9/L (ref 1.8–7.3)
NEUTROPHILS RELATIVE PERCENT: 64.6 % (ref 43–80)
PDW BLD-RTO: 14.9 FL (ref 11.5–15)
PLATELET # BLD: 211 E9/L (ref 130–450)
PMV BLD AUTO: 9.3 FL (ref 7–12)
RBC # BLD: 2.74 E12/L (ref 3.5–5.5)
WBC # BLD: 9.6 E9/L (ref 4.5–11.5)

## 2019-04-01 PROCEDURE — 85025 COMPLETE CBC W/AUTO DIFF WBC: CPT

## 2019-04-01 PROCEDURE — 6370000000 HC RX 637 (ALT 250 FOR IP): Performed by: SURGERY

## 2019-04-01 PROCEDURE — 85018 HEMOGLOBIN: CPT

## 2019-04-01 PROCEDURE — 99232 SBSQ HOSP IP/OBS MODERATE 35: CPT | Performed by: SURGERY

## 2019-04-01 PROCEDURE — 36415 COLL VENOUS BLD VENIPUNCTURE: CPT

## 2019-04-01 PROCEDURE — 85014 HEMATOCRIT: CPT

## 2019-04-01 RX ADMIN — PANTOPRAZOLE SODIUM 40 MG: 40 TABLET, DELAYED RELEASE ORAL at 06:15

## 2019-04-01 RX ADMIN — LISINOPRIL 10 MG: 10 TABLET ORAL at 08:33

## 2019-04-01 RX ADMIN — SUCRALFATE 1 G: 1 TABLET ORAL at 06:15

## 2019-04-01 ASSESSMENT — PAIN SCALES - GENERAL: PAINLEVEL_OUTOF10: 0

## 2019-04-01 NOTE — PROGRESS NOTES
[facility]   ATTENDING PHYSICIAN PROGRESS NOTE     I have examined the patient, reviewed the record, and discussed the case with the APN/ Resident. I have reviewed all relevant labs and imaging data. Please refer to the APN/ resident's note. I agree with the assessment and plan with the following corrections/ additions. The following summarizes my clinical findings and independent assessment. CC: anemia    Patient without complaints. Denies abdominal pain. Tolerating a diet. Awake and alert. Follows commands. Heart: Regular. Lungs: Fairly clear bilaterally. Abdomen: Soft; bowel sounds active; minimal epigastric tenderness; no rebound; no guarding  Skin: Warm/dry. Patient Active Problem List    Diagnosis Date Noted    Hypertension 07/14/2013     Priority: High    Acute blood loss anemia 03/29/2019    Acute renal failure superimposed on stage 3 chronic kidney disease ([facility] [address] 03/29/2019    Lactic acidosis 03/29/2019    Abdominal pain 03/28/2019    Kidney stone 06/27/2018    Compression fracture of thoracic spine, non-traumatic (HCC) 10/03/2017    Closed subluxation of cervical spine 05/17/2017    Primary osteoarthritis involving multiple joints 10/04/2016    Osteoporosis 12/31/2014       Gastric/duodenal ulcer seen on EGD--on Protonix and Carafate  Anemia--likely secondary to acute blood loss from ulcers  Diet as tolerated  Discharge planning    Windy Pride MD, FACS  4/1/2019  3:41 PM      NOTE: This report was transcribed using voice recognition software. Every effort was made to ensure accuracy; however, inadvertent computerized transcription errors may be present.

## 2019-04-01 NOTE — PROGRESS NOTES
[facility]   Department of Pharmacy   Pharmacist Transition of Care Services         Patient Demographics  Name:  Adryan Mcintyre   Medical Record Number:  [id number]  Gender:  female   Age:  80 y.o. YOB: 1930    Primary Care Physician: KEVIN Shetty CNP  Primary Care Physician phone number:  [phone]  Readmission Risk (% from EPIC Patient List): 14 %     Patient plans to participate in [facility] HOSPITAL Meds to [facility] (Y/N): no    Pharmacist Review and Summary of Medications     Date of last reviewed/update: 4/1/19     Category Comments   New Medication Started   1. Pantoprazole 40mg PO BID  2. carafate 1 gram PO QID         Change in Outpatient Medication  (Dosage Form, Route,   Dose, or Frequency) 1. Discontinued Outpatient Medication   (or on Hold During Admission) 1. Other              Pharmacist Patient Education:    Date  Person Educated Content of Education               Documentation of Pharmacist Interventions and Follow-up Plan:     The following Pharmacist Transition of Care Services were completed:   [x]  Reviewed and summarized medication changes  []  Entire home medication list was reviewed for accuracy (sources: **)  []  Home medication list was updated or corrected     []  Patient education was provided on new medications  []  Patient education was provided on medication changes  []  Reviewed the After Visit Summary (AVS) with patient    Additional Interventions:  []  Inpatient prescriber was contacted and the following pharmacy recommendations        were accepted: **     [] Other interventions: **        Pharmacist: Mesfin Perera PharmD, [facility]  Date:  4/1/2019 11:27 AM  Time spent counseling on medications:  minutes

## 2019-04-01 NOTE — PROGRESS NOTES
Hospitalist Progress Note      PCP: Jhony Delcid, APRN - CNP    Date of Admission: 3/28/2019  Days in the hospital: 4    Chief Complaint: abd pain     Hospital Course:     Admitted for pain and found to have duodenitis. Surgery was consulted. EGD-  EGD - large duodenal ulcers, no active bleeding; large hiatal hernia with ulcers, no active bleeding; esophagitis LA grade A   Also found to have UTI. Urine culture mixed fer. Hg was stable and bleeding was noted    Subjective  Patient seen and examined at bedside. NAD, feels fine, has no complaints and no bleeding noted. Tolerated diet. Patient denies any fevers, chills, chest pain, shortness of breath, nausea, vomiting. Medications:  Reviewed    Infusion Medications    sodium chloride 50 mL/hr at 03/31/19 2056     Scheduled Medications    pantoprazole  40 mg Oral BID AC    levofloxacin  500 mg Intravenous Q48H    sucralfate  1 g Oral 4 times per day    lisinopril  10 mg Oral Daily    mirtazapine  30 mg Oral Nightly    sodium chloride  250 mL Intravenous Once    sodium chloride flush  10 mL Intravenous 2 times per day     PRN Meds: ondansetron, sodium chloride flush, magnesium hydroxide      Intake/Output Summary (Last 24 hours) at 4/1/2019 1014  Last data filed at 3/31/2019 1952  Gross per 24 hour   Intake 600 ml   Output --   Net 600 ml       Exam:    BP (!) 161/67   Pulse 83   Temp 98.8 °F (37.1 °C) (Temporal)   Resp 16   Ht 4' 11\" (1.499 m)   Wt 109 lb 11.2 oz (49.8 kg)   SpO2 98%   BMI 22.16 kg/m²     General appearance: No apparent distress, appears stated age and cooperative. HEENT: Normal cephalic, atraumatic without obvious deformity. Pupils equal, round, and reactive to light. Extra ocular muscles intact. Conjunctivae/corneas clear. Neck: Supple, with full range of motion. No jugular venous distention. Trachea midline. Respiratory:  Normal respiratory effort.  Clear to auscultation, bilaterally without Rales/Wheezes/Rhonchi. Cardiovascular: Regular rate and rhythm with normal S1/S2 without murmurs, rubs or gallops. Brisk capillary refill. 2+ lower extremity pulses (dorsalis pedis). Abdomen: Soft, tender, non-distended with normal bowel sounds , no rebound or guarding. Musculoskeletal: No clubbing, cyanosis or edema bilaterally. Full range of motion without deformity. Brisk capillary refill. 2+ lower extremity pulses (dorsalis pedis). Skin: Normal skin color. No rashes or lesions. Neurologic:  Neurovascularly intact without any focal sensory/motor deficits. Cranial nerves: II-XII intact, grossly non-focal.  Psychiatric: Alert and oriented, thought content appropriate, normal insight        Labs:   Recent Labs     03/30/19  0927  03/31/19  0528  03/31/19 2038 04/01/19  0552 04/01/19  0816   WBC 6.8  --  6.3  --   --  9.6  --    HGB 7.3*   < > 7.1*   < > 7.2* 7.6* 8.2*   HCT 23.5*   < > 22.7*   < > 23.7* 24.6* 27.0*     --  211  --   --  211  --     < > = values in this interval not displayed. Recent Labs     03/30/19  0927      K 4.4   *   CO2 20*   BUN 21   CREATININE 1.3*   CALCIUM 7.6*     No results for input(s): AST, ALT, BILIDIR, BILITOT, ALKPHOS in the last 72 hours. No results for input(s): INR in the last 72 hours. No results for input(s): Willia Beverage in the last 72 hours. Imaging:  CT ABDOMEN PELVIS W IV CONTRAST Additional Contrast? None   Final Result      1. Thickened wall of duodenum with adjacent inflammatory changes could   suggest duodenitis. Clinical correlation recommended. 2. Multiple gallstones layering within gallbladder. 3. Diverticulosis without evidence of acute diverticulitis. 4. Large hiatal hernia.             Assessment/Plan:  Active Hospital Problems    Diagnosis Date Noted    Hypertension [I10] 07/14/2013     Priority: High    Acute blood loss anemia [D62] 03/29/2019    Acute renal failure superimposed on stage 3 chronic

## 2019-04-01 NOTE — PROGRESS NOTES
CLINICAL PHARMACY: DISCHARGE MED RECONCILIATION/REVIEW    [facility]) Select Patient?: Yes  Total # of Interventions Recommended: 0   -   Total # Interventions Accepted: 0  Intervention Severity:   - Level 1 Intervention Present?: No   - Level 2 #: 0   - Level 3 #: 0   Time Spent (min): 15    Additional Documentation:

## 2019-04-02 ENCOUNTER — CARE COORDINATION (OUTPATIENT)
Dept: CASE MANAGEMENT | Age: 84
End: 2019-04-02

## 2019-04-02 DIAGNOSIS — R10.13 EPIGASTRIC PAIN: Primary | ICD-10-CM

## 2019-04-02 LAB
BLOOD CULTURE, ROUTINE: NORMAL
CULTURE, BLOOD 2: NORMAL

## 2019-04-02 NOTE — CARE COORDINATION
[facility] Transitions Initial Follow Up Call    Call within 2 business days of discharge: Yes    Patient: Deloris Valdivia Patient : 1930   MRN: [id number]  Reason for Admission: Epigastric pain  Discharge Date: 19 RARS: Readmission Risk Score: 14       Spoke with: Teddy Kruger, patient and her daughter Chelle Martinezorman: YOLY    Non-face-to-face services provided:  Scheduled appointment with PCP-Verified with patient and her daughter appt with Salvador Jo on 19 at 1315. Patient's daughter will transport patient to appointment. Scheduled appointment with Specialist-Patient's daughter will make follow up appt with Dr. Krzysztof Osborne. Confirmed with Loly Joaquin patient is to follow up in a month. Obtained and reviewed discharge summary and/or continuity of care documents    Care Transitions 24 Hour Call    Do you have any ongoing symptoms?:  No  Do you have a copy of your discharge instructions?:  Yes  Do you have all of your prescriptions and are they filled?:  Yes  Have you been contacted by a [id number] [facility] Pharmacist?:  No  Have you scheduled your follow up appointment?:  Yes  How are you going to get to your appointment?:  Car - family or friend to transport  Were you discharged with any Home Care or Post Acute Services:  No  Do you feel like you have everything you need to keep you well at home?:  Yes  Care Transitions Interventions  No Identified Needs       Spoke with patient and her daughter for initial care transition call post hospital discharge. Med review completed with patient's daughter; 1111F  entered. Patient's daughter manages the patient's meds and keeps the pill bottles at her home for safety. New rx for Protonix and Carafate obtained; Prilosec and Zantac have been discontinued. Loly Joaquin will be speaking to Salvador Jo NP about medication changes in order to help the patient sleep. Patient presented to the emergency department on 3/28/19 for abdominal pain and vomiting.   Patient

## 2019-04-03 ENCOUNTER — TELEPHONE (OUTPATIENT)
Dept: SURGERY | Age: 84
End: 2019-04-03

## 2019-04-03 NOTE — TELEPHONE ENCOUNTER
NO Luong received a call from patient Daughter Sal Bustos wanting to schedule a hospital F/U appointment. MA scheduled pt for 05/23/19 @ 1:45pm with  in [facility]. Address verified & appointment letter sent.       Electronically signed by Talon Holden MA on 4/3/19 at 3:11 PM

## 2019-04-04 ENCOUNTER — OFFICE VISIT (OUTPATIENT)
Dept: FAMILY MEDICINE CLINIC | Age: 84
End: 2019-04-04
Payer: COMMERCIAL

## 2019-04-04 ENCOUNTER — HOSPITAL ENCOUNTER (OUTPATIENT)
Age: 84
Discharge: HOME | End: 2019-04-06
Payer: COMMERCIAL

## 2019-04-04 VITALS
DIASTOLIC BLOOD PRESSURE: 70 MMHG | RESPIRATION RATE: 16 BRPM | BODY MASS INDEX: 24.07 KG/M2 | TEMPERATURE: 98 F | HEART RATE: 86 BPM | WEIGHT: 104 LBS | HEIGHT: 55 IN | OXYGEN SATURATION: 98 % | SYSTOLIC BLOOD PRESSURE: 124 MMHG

## 2019-04-04 DIAGNOSIS — N18.30 CHRONIC KIDNEY DISEASE, STAGE III (MODERATE) (HCC): ICD-10-CM

## 2019-04-04 DIAGNOSIS — D62 ACUTE BLOOD LOSS ANEMIA: ICD-10-CM

## 2019-04-04 DIAGNOSIS — R10.84 GENERALIZED ABDOMINAL PAIN: ICD-10-CM

## 2019-04-04 DIAGNOSIS — K29.80 DUODENITIS: ICD-10-CM

## 2019-04-04 DIAGNOSIS — D62 ACUTE BLOOD LOSS ANEMIA: Primary | ICD-10-CM

## 2019-04-04 LAB
BASOPHILS ABSOLUTE: 0.02 E9/L (ref 0–0.2)
BASOPHILS RELATIVE PERCENT: 0.3 % (ref 0–2)
EOSINOPHILS ABSOLUTE: 0.18 E9/L (ref 0.05–0.5)
EOSINOPHILS RELATIVE PERCENT: 2.4 % (ref 0–6)
HCT VFR BLD CALC: 27.8 % (ref 34–48)
HEMOGLOBIN: 8.4 G/DL (ref 11.5–15.5)
IMMATURE GRANULOCYTES #: 0.03 E9/L
IMMATURE GRANULOCYTES %: 0.4 % (ref 0–5)
LYMPHOCYTES ABSOLUTE: 2.8 E9/L (ref 1.5–4)
LYMPHOCYTES RELATIVE PERCENT: 37.3 % (ref 20–42)
MCH RBC QN AUTO: 27.3 PG (ref 26–35)
MCHC RBC AUTO-ENTMCNC: 30.2 % (ref 32–34.5)
MCV RBC AUTO: 90.3 FL (ref 80–99.9)
MONOCYTES ABSOLUTE: 0.87 E9/L (ref 0.1–0.95)
MONOCYTES RELATIVE PERCENT: 11.6 % (ref 2–12)
NEUTROPHILS ABSOLUTE: 3.61 E9/L (ref 1.8–7.3)
NEUTROPHILS RELATIVE PERCENT: 48 % (ref 43–80)
PDW BLD-RTO: 15.1 FL (ref 11.5–15)
PLATELET # BLD: 257 E9/L (ref 130–450)
PMV BLD AUTO: 10 FL (ref 7–12)
RBC # BLD: 3.08 E12/L (ref 3.5–5.5)
WBC # BLD: 7.5 E9/L (ref 4.5–11.5)

## 2019-04-04 PROCEDURE — 1111F DSCHRG MED/CURRENT MED MERGE: CPT | Performed by: NURSE PRACTITIONER

## 2019-04-04 PROCEDURE — 85025 COMPLETE CBC W/AUTO DIFF WBC: CPT

## 2019-04-04 PROCEDURE — 99495 TRANSJ CARE MGMT MOD F2F 14D: CPT | Performed by: NURSE PRACTITIONER

## 2019-04-04 RX ORDER — PANTOPRAZOLE SODIUM 40 MG/1
40 TABLET, DELAYED RELEASE ORAL
Qty: 60 TABLET | Refills: 0 | Status: SHIPPED | OUTPATIENT
Start: 2019-04-04 | End: 2019-05-23 | Stop reason: ALTCHOICE

## 2019-04-04 ASSESSMENT — ENCOUNTER SYMPTOMS
TROUBLE SWALLOWING: 0
APNEA: 0
SHORTNESS OF BREATH: 0
RHINORRHEA: 0
CONSTIPATION: 0
EYE REDNESS: 0
COLOR CHANGE: 0
EYE ITCHING: 0
FACIAL SWELLING: 0
EYE PAIN: 0
CHEST TIGHTNESS: 0
WHEEZING: 0
VOICE CHANGE: 0
ABDOMINAL DISTENTION: 0
DIARRHEA: 0
COUGH: 0
VOMITING: 0
PHOTOPHOBIA: 0
ABDOMINAL PAIN: 0
EYE DISCHARGE: 0
NAUSEA: 0
SORE THROAT: 0
CHOKING: 0
ANAL BLEEDING: 0
RECTAL PAIN: 0
STRIDOR: 0
BLOOD IN STOOL: 0

## 2019-04-04 ASSESSMENT — PATIENT HEALTH QUESTIONNAIRE - PHQ9
SUM OF ALL RESPONSES TO PHQ QUESTIONS 1-9: 0
SUM OF ALL RESPONSES TO PHQ9 QUESTIONS 1 & 2: 0
SUM OF ALL RESPONSES TO PHQ QUESTIONS 1-9: 0
2. FEELING DOWN, DEPRESSED OR HOPELESS: 0
1. LITTLE INTEREST OR PLEASURE IN DOING THINGS: 0

## 2019-04-04 NOTE — PROGRESS NOTES
Post-Discharge Transitional Care Management Services or Hospital Follow Up      Claudell Dais   YOB: 1930    Date of Office Visit:  4/4/2019  Date of Hospital Admission: 3/28/19  Date of Hospital Discharge: 4/1/19  Risk of hospital readmission (high >=14%.  Medium >=10%) :Readmission Risk Score: 14      Care management risk score Rising risk (score 2-5) and Complex Care (Scores >=6): 6     Non face to face  following discharge, date last encounter closed (first attempt may have been earlier): 4/2/2019  2:07 PM    Call initiated 2 business days of discharge: Yes    Patient Active Problem List   Diagnosis    Hypertension    Osteoporosis    Primary osteoarthritis involving multiple joints    Closed subluxation of cervical spine    Compression fracture of thoracic spine, non-traumatic (HCC)    Kidney stone    Abdominal pain    Acute blood loss anemia    Acute renal failure superimposed on stage 3 chronic kidney disease (HCC)    Lactic acidosis       Allergies   Allergen Reactions    Penicillins Swelling    Celebrex [Celecoxib] Nausea Only     Headache    Demerol Hcl [Meperidine] Nausea And Vomiting    Dust Mite Extract      Dust, grass, and trees    Percocet [Oxycodone-Acetaminophen] Nausea And Vomiting    Percodan [Oxycodone-Aspirin] Nausea And Vomiting    Propulsid [Cisapride] Other (See Comments)     Headache       Medications listed as ordered at the time of discharge from hospital   [facility] Medication Instructions [facility]:    Printed on:04/04/19 6408   Medication Information                      alendronate (FOSAMAX) 70 MG tablet  Take 1 tablet by mouth every 7 days             calcium carbonate (OSCAL) 500 MG TABS tablet  Take 500 mg by mouth daily             lisinopril (PRINIVIL;ZESTRIL) 10 MG tablet  Take 1 tablet by mouth daily             pantoprazole (PROTONIX) 40 MG tablet  Take 1 tablet by mouth 2 times daily (before meals)             sucralfate (CARAFATE) 1 GM tablet  Take 1 tablet by mouth 4 times daily             vitamin C (ASCORBIC ACID) 500 MG tablet  Take 500 mg by mouth daily             vitamin D (CHOLECALCIFEROL) 1000 UNIT TABS tablet  Take 1,000 Units by mouth daily                   Medications marked \"taking\" at this time  Outpatient Medications Marked as Taking for the 4/4/19 encounter (Office Visit) with Tory Cervnates, APRN - CNP   Medication Sig Dispense Refill    pantoprazole (PROTONIX) 40 MG tablet Take 1 tablet by mouth 2 times daily (before meals) 60 tablet 0    vitamin C (ASCORBIC ACID) 500 MG tablet Take 500 mg by mouth daily      calcium carbonate (OSCAL) 500 MG TABS tablet Take 500 mg by mouth daily      sucralfate (CARAFATE) 1 GM tablet Take 1 tablet by mouth 4 times daily 120 tablet 1    vitamin D (CHOLECALCIFEROL) 1000 UNIT TABS tablet Take 1,000 Units by mouth daily      lisinopril (PRINIVIL;ZESTRIL) 10 MG tablet Take 1 tablet by mouth daily 30 tablet 5    alendronate (FOSAMAX) 70 MG tablet Take 1 tablet by mouth every 7 days 12 tablet 3        Medications patient taking as of now reconciled against medications ordered at time of hospital discharge: Yes    Chief Complaint   Patient presents with    Follow-Up from Hospital       History of Present illness - Follow up of Hospital diagnosis(es): Acute Blood Loss Anemia, Abdominal Pain,Duodenitis    Inpatient course: Discharge summary reviewed- see chart. Interval history/Current status: Home/Stable    Review of Systems   Constitutional: Positive for activity change (improving) and fatigue (improving). Negative for appetite change, chills, diaphoresis, fever and unexpected weight change. HENT: Negative for congestion, ear discharge, ear pain, facial swelling, mouth sores, nosebleeds, postnasal drip, rhinorrhea, sneezing, sore throat, trouble swallowing and voice change. Eyes: Negative for photophobia, pain, discharge, redness, itching and visual disturbance.    Respiratory: Negative for apnea, cough, choking, chest tightness, shortness of breath, wheezing and stridor. Cardiovascular: Negative for chest pain, palpitations and leg swelling. Gastrointestinal: Negative for abdominal distention, abdominal pain, anal bleeding, blood in stool, constipation, diarrhea, nausea, rectal pain and vomiting. H/o GERD  H/o Drug induced constipation-improved on colace  H/o Duodenitis   Endocrine: Negative for cold intolerance, heat intolerance, polydipsia, polyphagia and polyuria. Genitourinary: Negative for decreased urine volume, difficulty urinating, dysuria, enuresis, flank pain, frequency, hematuria and urgency. H/o recurrent UTI   Musculoskeletal:        H/o Osteoporosis/compression fracture thoracic spine/chronic thoracic pain   Skin: Negative for color change, pallor and rash. Neurological: Negative for dizziness, tremors, seizures, syncope, facial asymmetry, speech difficulty, light-headedness, numbness and headaches. Hematological:        H/o Anemia   Psychiatric/Behavioral: Negative for dysphoric mood. The patient is not nervous/anxious. H/o Insomnia-improved on Remeron       Vitals:    04/04/19 1328   BP: 124/70   Site: Right Upper Arm   Position: Sitting   Cuff Size: Medium Adult   Pulse: 86   Resp: 16   Temp: 98 °F (36.7 °C)   TempSrc: Oral   SpO2: 98%   Weight: 104 lb (47.2 kg)   Height: 4' 7\" (1.397 m)     Body mass index is 24.17 kg/m².    Wt Readings from Last 3 Encounters:   04/04/19 104 lb (47.2 kg)   03/31/19 109 lb 11.2 oz (49.8 kg)   10/22/18 99 lb 9.6 oz (45.2 kg)     BP Readings from Last 3 Encounters:   04/04/19 124/70   04/01/19 (!) 161/67   03/29/19 131/60        Physical Exam:  General Appearance: alert and oriented to person, place and time, well-developed and well-nourished, in no acute distress  Skin: warm and dry, no rash or erythema  Head: normocephalic and atraumatic  Eyes: pupils equal, round, and reactive to light, extraocular eye movements intact, conjunctivae normal  ENT: tympanic membrane, external ear and ear canal normal bilaterally, oropharynx clear and moist with normal mucous membranes  Neck: neck supple and non tender without mass, no thyromegaly or thyroid nodules, no cervical lymphadenopathy   Pulmonary/Chest: clear to auscultation bilaterally- no wheezes, rales or rhonchi, normal air movement, no respiratory distress  Cardiovascular: normal rate, normal S1 and S2, no gallops, intact distal pulses and no carotid bruits  Abdomen: soft, mild epigastric tenderness present, non-distended, normal bowel sounds, no masses or organomegaly  Extremities: no cyanosis and no clubbing  Musculoskeletal: Moderate thoracic spine pain w/palpation, slow in changing positions, no erythema or edema present. Equal strength to BLE, Decreased ROM   Neurologic: gait and coordination normal and speech normal    Assessment/Plan:  1. Acute blood loss anemia  - WI DISCHARGE MEDS RECONCILED W/ CURRENT OUTPATIENT MED LIST  - CBC Auto Differential; Future    2. Generalized abdominal pain  - WI DISCHARGE MEDS RECONCILED W/ CURRENT OUTPATIENT MED LIST    3.  Duodenitis          Medical Decision Making: moderate complexity

## 2019-04-08 ENCOUNTER — CARE COORDINATION (OUTPATIENT)
Dept: CASE MANAGEMENT | Age: 84
End: 2019-04-08

## 2019-04-08 NOTE — CARE COORDINATION
[facility] Transitions Follow Up Call    2019    Patient: Luciana Obrien  Patient : 1930   MRN: [id number]  Reason for Admission: epigastric pain   Discharge Date: 19 RARS: Readmission Risk Score: 14    Attempted to reach the patient for Care Transition call post hospital discharge. Message left on her listed home number and her daughters listed mobile number with CTC's contact information requesting return phone call.      Follow Up  Future Appointments   Date Time Provider Haley Peña   2019  1:45 PM Ramy Noonan MD [facility]       Conrado Hernandez RN

## 2019-04-09 NOTE — CARE COORDINATION
[facility] Transitions Follow Up Call    2019    Patient: Deloris Valdivia  Patient : 1930   MRN: [id number]  Reason for Admission: epigastric pain   Discharge Date: 19 RARS: Readmission Risk Score: 14      Spoke with: [facility] Transitions Subsequent and Final Call    Subsequent and Final Calls  Do you have any ongoing symptoms?:  No  Have your medications changed?:  No  Do you have any questions related to your medications?:  No  Do you currently have any active services?:  No  Do you have any needs or concerns that I can assist you with?:  No  Identified Barriers:  None  Care Transitions Interventions  No Identified Needs  Other Interventions:          Spoke with Eddie Chowdhury for follow up care transition call. She reports that her stomach is \"finally back to normal\". She denies any further epigastric pain and reports that she is regularly moving her bowels daily without concern. She is eating and drinking without any issues. Her only complaint today is chronic back and neck pain which stems from a fall she suffered outside of the [employer] store 2 years ago. Eddie Chowdhury denies any needs, questions, or concerns at this time and is agreeable to a final call next week.     Follow Up  Future Appointments   Date Time Provider Haley Peña   2019  1:45 PM Chandrika Roberts MD [facility]       Crys Salinas RN

## 2019-04-15 ENCOUNTER — CARE COORDINATION (OUTPATIENT)
Dept: CASE MANAGEMENT | Age: 84
End: 2019-04-15

## 2019-04-15 NOTE — CARE COORDINATION
[facility] Transitions Follow Up Call    4/15/2019    Patient: Criss Levine  Patient : 1930   MRN: <[id number]>  Reason for Admission: epigastric pain  Discharge Date: 19 RARS: Readmission Risk Score: 15      Spoke with: [facility] Transitions Subsequent and Final Call    Subsequent and Final Calls  Do you have any ongoing symptoms?:  No  Have your medications changed?:  No  Do you have any questions related to your medications?:  No  Do you currently have any active services?:  No  Do you have any needs or concerns that I can assist you with?:  No  Identified Barriers:  None  Care Transitions Interventions  No Identified Needs  Other Interventions:        Called pt for the final transition call. Pt denied any abdominal pain, diarrhea, constipation, or nausea and vomiting. Pt stated her only pain is in her back & she needs surgery. Pt denied any needs or concerns. Pt informed this is the final transition of care call. Instructed to call the primary care provider for any concerns. Please call during the regular office hours if possible, for urgent problems,  there is a provider on call. Call the office & follow the prompts. The answering service is able to make appointments for the following day. Call 911 for emergencies.     Follow Up  Future Appointments   Date Time Provider Haley Peña   2019  1:45 PM Loyd Reinoso MD [address] [place] [address] RN  Care Transition Coordinator  [phone]

## 2019-04-15 NOTE — DISCHARGE SUMMARY
duodenal ulcers, no active bleeding; large hiatal hernia with ulcers, no active bleeding; esophagitis LA grade A   Also found to have UTI. Urine culture mixed fer. Hg was stable and bleeding was noted  Diet was tolerated  Cleared by surgery for discharge  Stable at time of discharge to home. Consults:     IP CONSULT TO GENERAL SURGERY  IP CONSULT TO DIETITIAN    Significant Diagnostic Studies:  As above      Discharge Instructions/Follow-up:  As above       Activity: activity as tolerated    Physical Exam:  Vitals:    04/01/19 0815   BP: (!) 161/67   Pulse: 83   Resp: 16   Temp: 98.8 °F (37.1 °C)   SpO2: 98%       General appearance: No apparent distress, appears stated age and cooperative. HEENT: Normal cephalic, atraumatic without obvious deformity. Pupils equal, round, and reactive to light.  Extra ocular muscles intact. Conjunctivae/corneas clear. Neck: Supple, with full range of motion. No jugular venous distention. Trachea midline. Respiratory:  Normal respiratory effort. Clear to auscultation, bilaterally without Rales/Wheezes/Rhonchi. Cardiovascular: Regular rate and rhythm with normal S1/S2 without murmurs, rubs or gallops. Brisk capillary refill. 2+ lower extremity pulses (dorsalis pedis). Abdomen: Soft, tender, non-distended with normal bowel sounds , no rebound or guarding. Musculoskeletal: No clubbing, cyanosis or edema bilaterally.  Full range of motion without deformity. Brisk capillary refill. 2+ lower extremity pulses (dorsalis pedis). Skin: Normal skin color.  No rashes or lesions. Neurologic:  Neurovascularly intact without any focal sensory/motor deficits. Cranial nerves: II-XII intact, grossly non-focal.  Psychiatric: Alert and oriented, thought content appropriate, normal insight        Labs:  For convenience and continuity at follow-up the following most recent labs are provided:      CBC:    Lab Results   Component Value Date    WBC 7.5 04/04/2019    HGB 8.4 04/04/2019

## 2019-05-23 ENCOUNTER — OFFICE VISIT (OUTPATIENT)
Dept: SURGERY | Age: 84
End: 2019-05-23
Payer: COMMERCIAL

## 2019-05-23 ENCOUNTER — APPOINTMENT (OUTPATIENT)
Dept: GENERAL RADIOLOGY | Age: 84
DRG: 378 | End: 2019-05-23
Payer: COMMERCIAL

## 2019-05-23 ENCOUNTER — HOSPITAL ENCOUNTER (INPATIENT)
Age: 84
LOS: 2 days | Discharge: HOME HEALTH | DRG: 378 | End: 2019-05-25
Attending: EMERGENCY MEDICINE | Admitting: STUDENT IN AN ORGANIZED HEALTH CARE EDUCATION/TRAINING PROGRAM
Payer: COMMERCIAL

## 2019-05-23 VITALS
DIASTOLIC BLOOD PRESSURE: 74 MMHG | TEMPERATURE: 98.1 F | WEIGHT: 106 LBS | SYSTOLIC BLOOD PRESSURE: 122 MMHG | RESPIRATION RATE: 14 BRPM | BODY MASS INDEX: 24.53 KG/M2 | HEIGHT: 55 IN

## 2019-05-23 DIAGNOSIS — I10 ESSENTIAL HYPERTENSION: ICD-10-CM

## 2019-05-23 DIAGNOSIS — K92.0 GASTROINTESTINAL HEMORRHAGE WITH HEMATEMESIS: Primary | ICD-10-CM

## 2019-05-23 DIAGNOSIS — K92.2 GASTROINTESTINAL HEMORRHAGE, UNSPECIFIED GASTROINTESTINAL HEMORRHAGE TYPE: Primary | ICD-10-CM

## 2019-05-23 LAB
ABO/RH: NORMAL
ALBUMIN SERPL-MCNC: 3.7 G/DL (ref 3.5–5.2)
ALP BLD-CCNC: 52 U/L (ref 35–104)
ALT SERPL-CCNC: 10 U/L (ref 0–32)
ANION GAP SERPL CALCULATED.3IONS-SCNC: 14 MMOL/L (ref 7–16)
ANTIBODY SCREEN: NORMAL
APTT: 22.1 SEC (ref 24.5–35.1)
AST SERPL-CCNC: 17 U/L (ref 0–31)
BASOPHILS ABSOLUTE: 0.01 E9/L (ref 0–0.2)
BASOPHILS RELATIVE PERCENT: 0.2 % (ref 0–2)
BILIRUB SERPL-MCNC: <0.2 MG/DL (ref 0–1.2)
BUN BLDV-MCNC: 38 MG/DL (ref 8–23)
CALCIUM SERPL-MCNC: 9.4 MG/DL (ref 8.6–10.2)
CHLORIDE BLD-SCNC: 103 MMOL/L (ref 98–107)
CO2: 20 MMOL/L (ref 22–29)
CREAT SERPL-MCNC: 1.5 MG/DL (ref 0.5–1)
EOSINOPHILS ABSOLUTE: 0.07 E9/L (ref 0.05–0.5)
EOSINOPHILS RELATIVE PERCENT: 1.1 % (ref 0–6)
GFR AFRICAN AMERICAN: 40
GFR NON-AFRICAN AMERICAN: 33 ML/MIN/1.73
GLUCOSE BLD-MCNC: 109 MG/DL (ref 74–99)
HCT VFR BLD CALC: 20.5 % (ref 34–48)
HEMOGLOBIN: 6.2 G/DL (ref 11.5–15.5)
IMMATURE GRANULOCYTES #: 0.02 E9/L
IMMATURE GRANULOCYTES %: 0.3 % (ref 0–5)
INR BLD: 1
LYMPHOCYTES ABSOLUTE: 2.11 E9/L (ref 1.5–4)
LYMPHOCYTES RELATIVE PERCENT: 32 % (ref 20–42)
MCH RBC QN AUTO: 26.1 PG (ref 26–35)
MCHC RBC AUTO-ENTMCNC: 30.2 % (ref 32–34.5)
MCV RBC AUTO: 86.1 FL (ref 80–99.9)
MONOCYTES ABSOLUTE: 0.69 E9/L (ref 0.1–0.95)
MONOCYTES RELATIVE PERCENT: 10.5 % (ref 2–12)
NEUTROPHILS ABSOLUTE: 3.69 E9/L (ref 1.8–7.3)
NEUTROPHILS RELATIVE PERCENT: 55.9 % (ref 43–80)
PDW BLD-RTO: 15.9 FL (ref 11.5–15)
PLATELET # BLD: 253 E9/L (ref 130–450)
PMV BLD AUTO: 9.3 FL (ref 7–12)
POTASSIUM REFLEX MAGNESIUM: 5 MMOL/L (ref 3.5–5)
PROTHROMBIN TIME: 10.8 SEC (ref 9.3–12.4)
RBC # BLD: 2.38 E12/L (ref 3.5–5.5)
SODIUM BLD-SCNC: 137 MMOL/L (ref 132–146)
TOTAL PROTEIN: 6.4 G/DL (ref 6.4–8.3)
TROPONIN: <0.01 NG/ML (ref 0–0.03)
WBC # BLD: 6.6 E9/L (ref 4.5–11.5)

## 2019-05-23 PROCEDURE — 2580000003 HC RX 258: Performed by: STUDENT IN AN ORGANIZED HEALTH CARE EDUCATION/TRAINING PROGRAM

## 2019-05-23 PROCEDURE — 1200000000 HC SEMI PRIVATE

## 2019-05-23 PROCEDURE — 86900 BLOOD TYPING SEROLOGIC ABO: CPT

## 2019-05-23 PROCEDURE — 94761 N-INVAS EAR/PLS OXIMETRY MLT: CPT

## 2019-05-23 PROCEDURE — 96374 THER/PROPH/DIAG INJ IV PUSH: CPT

## 2019-05-23 PROCEDURE — 96375 TX/PRO/DX INJ NEW DRUG ADDON: CPT

## 2019-05-23 PROCEDURE — 84484 ASSAY OF TROPONIN QUANT: CPT

## 2019-05-23 PROCEDURE — 85025 COMPLETE CBC W/AUTO DIFF WBC: CPT

## 2019-05-23 PROCEDURE — G0378 HOSPITAL OBSERVATION PER HR: HCPCS

## 2019-05-23 PROCEDURE — 96376 TX/PRO/DX INJ SAME DRUG ADON: CPT

## 2019-05-23 PROCEDURE — C9113 INJ PANTOPRAZOLE SODIUM, VIA: HCPCS | Performed by: EMERGENCY MEDICINE

## 2019-05-23 PROCEDURE — 36430 TRANSFUSION BLD/BLD COMPNT: CPT

## 2019-05-23 PROCEDURE — 93005 ELECTROCARDIOGRAM TRACING: CPT | Performed by: EMERGENCY MEDICINE

## 2019-05-23 PROCEDURE — 86923 COMPATIBILITY TEST ELECTRIC: CPT

## 2019-05-23 PROCEDURE — 36415 COLL VENOUS BLD VENIPUNCTURE: CPT

## 2019-05-23 PROCEDURE — 99285 EMERGENCY DEPT VISIT HI MDM: CPT

## 2019-05-23 PROCEDURE — 86850 RBC ANTIBODY SCREEN: CPT

## 2019-05-23 PROCEDURE — 80053 COMPREHEN METABOLIC PANEL: CPT

## 2019-05-23 PROCEDURE — 85610 PROTHROMBIN TIME: CPT

## 2019-05-23 PROCEDURE — 71045 X-RAY EXAM CHEST 1 VIEW: CPT

## 2019-05-23 PROCEDURE — P9016 RBC LEUKOCYTES REDUCED: HCPCS

## 2019-05-23 PROCEDURE — C9113 INJ PANTOPRAZOLE SODIUM, VIA: HCPCS | Performed by: STUDENT IN AN ORGANIZED HEALTH CARE EDUCATION/TRAINING PROGRAM

## 2019-05-23 PROCEDURE — 6360000002 HC RX W HCPCS: Performed by: STUDENT IN AN ORGANIZED HEALTH CARE EDUCATION/TRAINING PROGRAM

## 2019-05-23 PROCEDURE — 85730 THROMBOPLASTIN TIME PARTIAL: CPT

## 2019-05-23 PROCEDURE — 6360000002 HC RX W HCPCS: Performed by: EMERGENCY MEDICINE

## 2019-05-23 PROCEDURE — 86901 BLOOD TYPING SEROLOGIC RH(D): CPT

## 2019-05-23 PROCEDURE — 99214 OFFICE O/P EST MOD 30 MIN: CPT | Performed by: SURGERY

## 2019-05-23 PROCEDURE — 6370000000 HC RX 637 (ALT 250 FOR IP): Performed by: STUDENT IN AN ORGANIZED HEALTH CARE EDUCATION/TRAINING PROGRAM

## 2019-05-23 RX ORDER — 0.9 % SODIUM CHLORIDE 0.9 %
250 INTRAVENOUS SOLUTION INTRAVENOUS ONCE
Status: DISCONTINUED | OUTPATIENT
Start: 2019-05-23 | End: 2019-05-25 | Stop reason: HOSPADM

## 2019-05-23 RX ORDER — SODIUM CHLORIDE 0.9 % (FLUSH) 0.9 %
10 SYRINGE (ML) INJECTION PRN
Status: DISCONTINUED | OUTPATIENT
Start: 2019-05-23 | End: 2019-05-25 | Stop reason: HOSPADM

## 2019-05-23 RX ORDER — PANTOPRAZOLE SODIUM 40 MG/10ML
40 INJECTION, POWDER, LYOPHILIZED, FOR SOLUTION INTRAVENOUS ONCE
Status: COMPLETED | OUTPATIENT
Start: 2019-05-23 | End: 2019-05-23

## 2019-05-23 RX ORDER — ONDANSETRON 2 MG/ML
4 INJECTION INTRAMUSCULAR; INTRAVENOUS EVERY 6 HOURS PRN
Status: DISCONTINUED | OUTPATIENT
Start: 2019-05-23 | End: 2019-05-25 | Stop reason: HOSPADM

## 2019-05-23 RX ORDER — ONDANSETRON 2 MG/ML
4 INJECTION INTRAMUSCULAR; INTRAVENOUS ONCE
Status: COMPLETED | OUTPATIENT
Start: 2019-05-23 | End: 2019-05-23

## 2019-05-23 RX ORDER — LISINOPRIL 10 MG/1
10 TABLET ORAL DAILY
Status: DISCONTINUED | OUTPATIENT
Start: 2019-05-24 | End: 2019-05-25 | Stop reason: HOSPADM

## 2019-05-23 RX ORDER — MORPHINE SULFATE 2 MG/ML
2 INJECTION, SOLUTION INTRAMUSCULAR; INTRAVENOUS ONCE
Status: COMPLETED | OUTPATIENT
Start: 2019-05-23 | End: 2019-05-23

## 2019-05-23 RX ORDER — SODIUM CHLORIDE 0.9 % (FLUSH) 0.9 %
10 SYRINGE (ML) INJECTION EVERY 12 HOURS SCHEDULED
Status: DISCONTINUED | OUTPATIENT
Start: 2019-05-23 | End: 2019-05-25 | Stop reason: HOSPADM

## 2019-05-23 RX ORDER — ACETAMINOPHEN 325 MG/1
650 TABLET ORAL EVERY 4 HOURS PRN
Status: DISCONTINUED | OUTPATIENT
Start: 2019-05-23 | End: 2019-05-25 | Stop reason: HOSPADM

## 2019-05-23 RX ORDER — PANTOPRAZOLE SODIUM 40 MG/10ML
40 INJECTION, POWDER, LYOPHILIZED, FOR SOLUTION INTRAVENOUS 2 TIMES DAILY
Status: DISCONTINUED | OUTPATIENT
Start: 2019-05-23 | End: 2019-05-25 | Stop reason: HOSPADM

## 2019-05-23 RX ORDER — SODIUM CHLORIDE, SODIUM LACTATE, POTASSIUM CHLORIDE, CALCIUM CHLORIDE 600; 310; 30; 20 MG/100ML; MG/100ML; MG/100ML; MG/100ML
INJECTION, SOLUTION INTRAVENOUS CONTINUOUS
Status: DISCONTINUED | OUTPATIENT
Start: 2019-05-23 | End: 2019-05-25

## 2019-05-23 RX ORDER — SUCRALFATE 1 G/1
1 TABLET ORAL 4 TIMES DAILY
Status: DISCONTINUED | OUTPATIENT
Start: 2019-05-23 | End: 2019-05-25 | Stop reason: HOSPADM

## 2019-05-23 RX ADMIN — ONDANSETRON HYDROCHLORIDE 4 MG: 2 SOLUTION INTRAMUSCULAR; INTRAVENOUS at 15:50

## 2019-05-23 RX ADMIN — PANTOPRAZOLE SODIUM 40 MG: 40 INJECTION, POWDER, FOR SOLUTION INTRAVENOUS at 15:48

## 2019-05-23 RX ADMIN — SUCRALFATE 1 G: 1 TABLET ORAL at 21:19

## 2019-05-23 RX ADMIN — MORPHINE SULFATE 2 MG: 2 INJECTION, SOLUTION INTRAMUSCULAR; INTRAVENOUS at 16:01

## 2019-05-23 RX ADMIN — Medication 10 ML: at 20:10

## 2019-05-23 RX ADMIN — PANTOPRAZOLE SODIUM 40 MG: 40 INJECTION, POWDER, FOR SOLUTION INTRAVENOUS at 21:19

## 2019-05-23 ASSESSMENT — ENCOUNTER SYMPTOMS
ALLERGIC/IMMUNOLOGIC NEGATIVE: 1
BLOATING: 0
EYES NEGATIVE: 1
TENESMUS: 0
CONSTIPATION: 0
ABDOMINAL PAIN: 1
SHORTNESS OF BREATH: 1
BACK PAIN: 0
ODYNOPHAGIA: 0

## 2019-05-23 ASSESSMENT — PAIN SCALES - GENERAL
PAINLEVEL_OUTOF10: 4
PAINLEVEL_OUTOF10: 5

## 2019-05-23 NOTE — TELEPHONE ENCOUNTER
Identified care gap per [employer]: LISINOPRIL TAB 10MG   Per records, appears 30-day supply last filled 01/11/     Per Dev Muscat: [address]: [phone] (unable to provide me which adherence information)  Per dispense report:    Medication: LISINOPRIL   TAB 10MG  Last 3 fill dates: 2019  Day supply: 30  Refills remainin  Directions: Take 1 tablet by mouth daily  Insurance billed: Katharina Ardon  Prescribing Provider: Mik Nguyen    Attempting to reach patient to discuss getting lisinopril tab 10mg filled for a 90ds. Left message asking for return call to toll free [phone] option 7.     [facility], toll free: [phone], option 7
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
## 2019-05-23 NOTE — ED NOTES
Bed: 01  Expected date:   Expected time:   Means of arrival:   Comments:  ems     Tanya Villeda RN  05/23/19 7267
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
## 2019-05-23 NOTE — PROGRESS NOTES
[facility]  PROGRESS NOTE  ATTENDING NOTE    Chief Complaint   Patient presents with    Follow-Up from [address] F/U from ulcers. Patient states hasnt been feeling good for 3 weeks     Fatigue     Extreme tiredness not sleeping good    Shortness of Breath     Patient having horrible shortness of breath.  Nausea & Vomiting     Patient denies    Abdominal Pain     Patient states has lower abdominal pain that comes and goes. S:  89y/o F who has not been feeling well for 3 weeks. She has not been to see her PCP because she is not feeling well. She had a recent GI bleed with large duodenal ulcers on EGD. She was sent home on Carafate and PPI. She took them both for about a month and then she was out of the PPI. She did not bother to call for a refill. She has been having melanotic stools x 3 weeks. She is pale. Between seeing patients, her daughter grabbed me in the room and stated her mother was feeling like she was going to pass out. She was tachycardic and diaphoretic. We assisted her to the examination table so she could lie down, gave her a glass of water and called 911. I proceeded to obtain the history as above and did an examination. O:  /74 (Site: Left Upper Arm, Position: Sitting, Cuff Size: Medium Adult)   Temp 98.1 °F (36.7 °C) (Oral)   Resp 14   Ht 4' 7\" (1.397 m)   Wt 106 lb (48.1 kg)   BMI 24.64 kg/m²   Physical Exam   Constitutional: She is oriented to person, place, and time. She appears well-developed. HENT:   Head: Normocephalic and atraumatic. Eyes: Pupils are equal, round, and reactive to light. EOM are normal.   Neck: Normal range of motion. No tracheal deviation present. Cardiovascular:   Tachycardic, regular rhythm     Pulmonary/Chest: Effort normal and breath sounds normal.   Abdominal: Soft. She exhibits no distension. There is tenderness (epigastric and RLQ severe). Musculoskeletal: Normal range of motion.  She
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
## 2019-05-24 PROBLEM — K27.9 PUD (PEPTIC ULCER DISEASE): Status: ACTIVE | Noted: 2019-05-24

## 2019-05-24 LAB
ANION GAP SERPL CALCULATED.3IONS-SCNC: 13 MMOL/L (ref 7–16)
BACTERIA: ABNORMAL /HPF
BASOPHILS ABSOLUTE: 0.01 E9/L (ref 0–0.2)
BASOPHILS RELATIVE PERCENT: 0.1 % (ref 0–2)
BILIRUBIN URINE: NEGATIVE
BLOOD BANK DISPENSE STATUS: NORMAL
BLOOD BANK DISPENSE STATUS: NORMAL
BLOOD BANK PRODUCT CODE: NORMAL
BLOOD BANK PRODUCT CODE: NORMAL
BLOOD, URINE: NEGATIVE
BPU ID: NORMAL
BPU ID: NORMAL
BUN BLDV-MCNC: 36 MG/DL (ref 8–23)
CALCIUM SERPL-MCNC: 8.9 MG/DL (ref 8.6–10.2)
CHLORIDE BLD-SCNC: 106 MMOL/L (ref 98–107)
CLARITY: ABNORMAL
CO2: 21 MMOL/L (ref 22–29)
COLOR: YELLOW
CREAT SERPL-MCNC: 1.6 MG/DL (ref 0.5–1)
DESCRIPTION BLOOD BANK: NORMAL
DESCRIPTION BLOOD BANK: NORMAL
EKG ATRIAL RATE: 78 BPM
EKG P AXIS: 36 DEGREES
EKG P-R INTERVAL: 168 MS
EKG Q-T INTERVAL: 402 MS
EKG QRS DURATION: 114 MS
EKG QTC CALCULATION (BAZETT): 458 MS
EKG R AXIS: 5 DEGREES
EKG T AXIS: 11 DEGREES
EKG VENTRICULAR RATE: 78 BPM
EOSINOPHILS ABSOLUTE: 0.14 E9/L (ref 0.05–0.5)
EOSINOPHILS RELATIVE PERCENT: 2 % (ref 0–6)
EPITHELIAL CELLS, UA: ABNORMAL /HPF
GFR AFRICAN AMERICAN: 37
GFR NON-AFRICAN AMERICAN: 30 ML/MIN/1.73
GLUCOSE BLD-MCNC: 106 MG/DL (ref 74–99)
GLUCOSE URINE: NEGATIVE MG/DL
HCT VFR BLD CALC: 27.9 % (ref 34–48)
HCT VFR BLD CALC: 32.4 % (ref 34–48)
HCT VFR BLD CALC: 33.8 % (ref 34–48)
HEMOGLOBIN: 10.5 G/DL (ref 11.5–15.5)
HEMOGLOBIN: 11 G/DL (ref 11.5–15.5)
HEMOGLOBIN: 8.8 G/DL (ref 11.5–15.5)
IMMATURE GRANULOCYTES #: 0.03 E9/L
IMMATURE GRANULOCYTES %: 0.4 % (ref 0–5)
KETONES, URINE: NEGATIVE MG/DL
LEUKOCYTE ESTERASE, URINE: ABNORMAL
LYMPHOCYTES ABSOLUTE: 1.74 E9/L (ref 1.5–4)
LYMPHOCYTES RELATIVE PERCENT: 25.4 % (ref 20–42)
MCH RBC QN AUTO: 26.8 PG (ref 26–35)
MCHC RBC AUTO-ENTMCNC: 31.5 % (ref 32–34.5)
MCV RBC AUTO: 85.1 FL (ref 80–99.9)
MONOCYTES ABSOLUTE: 0.78 E9/L (ref 0.1–0.95)
MONOCYTES RELATIVE PERCENT: 11.4 % (ref 2–12)
NEUTROPHILS ABSOLUTE: 4.15 E9/L (ref 1.8–7.3)
NEUTROPHILS RELATIVE PERCENT: 60.7 % (ref 43–80)
NITRITE, URINE: POSITIVE
PDW BLD-RTO: 14.7 FL (ref 11.5–15)
PH UA: 7 (ref 5–9)
PLATELET # BLD: 214 E9/L (ref 130–450)
PMV BLD AUTO: 9.4 FL (ref 7–12)
POTASSIUM REFLEX MAGNESIUM: 4.8 MMOL/L (ref 3.5–5)
PROTEIN UA: NEGATIVE MG/DL
RBC # BLD: 3.28 E12/L (ref 3.5–5.5)
RBC UA: ABNORMAL /HPF (ref 0–2)
SODIUM BLD-SCNC: 140 MMOL/L (ref 132–146)
SPECIFIC GRAVITY UA: <=1.005 (ref 1–1.03)
UROBILINOGEN, URINE: 0.2 E.U./DL
WBC # BLD: 6.9 E9/L (ref 4.5–11.5)
WBC UA: >20 /HPF (ref 0–5)

## 2019-05-24 PROCEDURE — 85018 HEMOGLOBIN: CPT

## 2019-05-24 PROCEDURE — 81001 URINALYSIS AUTO W/SCOPE: CPT

## 2019-05-24 PROCEDURE — 85025 COMPLETE CBC W/AUTO DIFF WBC: CPT

## 2019-05-24 PROCEDURE — 85014 HEMATOCRIT: CPT

## 2019-05-24 PROCEDURE — 6360000002 HC RX W HCPCS: Performed by: STUDENT IN AN ORGANIZED HEALTH CARE EDUCATION/TRAINING PROGRAM

## 2019-05-24 PROCEDURE — 6370000000 HC RX 637 (ALT 250 FOR IP): Performed by: STUDENT IN AN ORGANIZED HEALTH CARE EDUCATION/TRAINING PROGRAM

## 2019-05-24 PROCEDURE — 2580000003 HC RX 258: Performed by: STUDENT IN AN ORGANIZED HEALTH CARE EDUCATION/TRAINING PROGRAM

## 2019-05-24 PROCEDURE — 93010 ELECTROCARDIOGRAM REPORT: CPT | Performed by: INTERNAL MEDICINE

## 2019-05-24 PROCEDURE — C9113 INJ PANTOPRAZOLE SODIUM, VIA: HCPCS | Performed by: STUDENT IN AN ORGANIZED HEALTH CARE EDUCATION/TRAINING PROGRAM

## 2019-05-24 PROCEDURE — 80048 BASIC METABOLIC PNL TOTAL CA: CPT

## 2019-05-24 PROCEDURE — 96361 HYDRATE IV INFUSION ADD-ON: CPT

## 2019-05-24 PROCEDURE — 87186 SC STD MICRODIL/AGAR DIL: CPT

## 2019-05-24 PROCEDURE — 1200000000 HC SEMI PRIVATE

## 2019-05-24 PROCEDURE — 36415 COLL VENOUS BLD VENIPUNCTURE: CPT

## 2019-05-24 PROCEDURE — 99232 SBSQ HOSP IP/OBS MODERATE 35: CPT | Performed by: SURGERY

## 2019-05-24 PROCEDURE — 87088 URINE BACTERIA CULTURE: CPT

## 2019-05-24 PROCEDURE — G0378 HOSPITAL OBSERVATION PER HR: HCPCS

## 2019-05-24 PROCEDURE — P9016 RBC LEUKOCYTES REDUCED: HCPCS

## 2019-05-24 PROCEDURE — 96375 TX/PRO/DX INJ NEW DRUG ADDON: CPT

## 2019-05-24 PROCEDURE — 96376 TX/PRO/DX INJ SAME DRUG ADON: CPT

## 2019-05-24 RX ORDER — 0.9 % SODIUM CHLORIDE 0.9 %
250 INTRAVENOUS SOLUTION INTRAVENOUS ONCE
Status: DISCONTINUED | OUTPATIENT
Start: 2019-05-24 | End: 2019-05-25 | Stop reason: HOSPADM

## 2019-05-24 RX ADMIN — SUCRALFATE 1 G: 1 TABLET ORAL at 08:23

## 2019-05-24 RX ADMIN — SUCRALFATE 1 G: 1 TABLET ORAL at 12:52

## 2019-05-24 RX ADMIN — LISINOPRIL 10 MG: 10 TABLET ORAL at 08:23

## 2019-05-24 RX ADMIN — Medication 10 ML: at 08:23

## 2019-05-24 RX ADMIN — CEFTRIAXONE SODIUM 1 G: 1 INJECTION, POWDER, FOR SOLUTION INTRAMUSCULAR; INTRAVENOUS at 08:23

## 2019-05-24 RX ADMIN — SUCRALFATE 1 G: 1 TABLET ORAL at 20:38

## 2019-05-24 RX ADMIN — SODIUM CHLORIDE, POTASSIUM CHLORIDE, SODIUM LACTATE AND CALCIUM CHLORIDE: 600; 310; 30; 20 INJECTION, SOLUTION INTRAVENOUS at 08:24

## 2019-05-24 RX ADMIN — PANTOPRAZOLE SODIUM 40 MG: 40 INJECTION, POWDER, FOR SOLUTION INTRAVENOUS at 08:23

## 2019-05-24 RX ADMIN — ACETAMINOPHEN 650 MG: 325 TABLET, FILM COATED ORAL at 12:52

## 2019-05-24 RX ADMIN — PANTOPRAZOLE SODIUM 40 MG: 40 INJECTION, POWDER, FOR SOLUTION INTRAVENOUS at 20:38

## 2019-05-24 ASSESSMENT — PAIN SCALES - GENERAL
PAINLEVEL_OUTOF10: 0
PAINLEVEL_OUTOF10: 0
PAINLEVEL_OUTOF10: 3
PAINLEVEL_OUTOF10: 0
PAINLEVEL_OUTOF10: 0
PAINLEVEL_OUTOF10: 4

## 2019-05-24 NOTE — PLAN OF CARE
Problem: Falls - Risk of:  Goal: Will remain free from falls  Description  Will remain free from falls  5/24/2019 1133 by Dawson Silva RN  Outcome: Met This Shift  5/24/2019 1119 by Dawson Silva RN  Outcome: Met This Shift  5/24/2019 0040 by Moisés Michele RN  Outcome: Met This Shift     Problem: Risk for Impaired Skin Integrity  Goal: Tissue integrity - skin and mucous membranes  Description  Structural intactness and normal physiological function of skin and  mucous membranes.   5/24/2019 1133 by Dawson Silva RN  Outcome: Met This Shift  5/24/2019 1119 by Dawson Silva RN  Outcome: Met This Shift  5/24/2019 0040 by Moisés Michele RN  Outcome: Met This Shift

## 2019-05-24 NOTE — PROGRESS NOTES
[facility]  ATTENDING PHYSICIAN SURGERY PROGRESS NOTE     I have examined the patient, reviewed the record, and discussed the case with the APN/  Resident. I have reviewed all relevant labs and imaging data. The following summarizes my clinical findings and independent assessment. Chief Complaint   Patient presents with    Rectal Bleeding     seen today at dr Caridad Plata office for follow up regarding gi bleed per ems they wanted her reevaluated further due to still having bloody stools skin color pale upon arrival complaining of occasional sob and fatigue       S: Patient resting comfortably. She can not state if she is having blooy stool. Denies any pain. No nausea or vomiting     O:  Physical Exam   Constitutional: She appears well-developed. HENT:   Head: Atraumatic. Eyes: EOM are normal.   Neck: Neck supple. Cardiovascular: Normal rate. Pulmonary/Chest: Effort normal. No respiratory distress. Abdominal: Soft. She exhibits no distension. There is no tenderness. There is no rebound and no guarding. Musculoskeletal: Normal range of motion. Neurological: She is alert. Skin: Skin is warm. Psychiatric: She has a normal mood and affect. Assessment   Active Problems:    GIB (gastrointestinal bleeding)    PUD (peptic ulcer disease)  Resolved Problems:    * No resolved hospital problems.  *      Plan   PUD   Pain control - tylenol if needed   IVF Hep lock tomorrow when given a diet if no further bleeding    Aggressive pulmonary hygiene   Rocephin for UTI - UC pending   Received 2 units PRBC 6.2 -> 11.0   PCDs, Heparin tomorrow if no signs GIB   PIV  Home lisinipril ordered , Alendronate Q 7 days held   PPI BID and carafate    No glycemic issues     Dispo 8400     Maru Hu MD

## 2019-05-24 NOTE — TELEPHONE ENCOUNTER
Return call from Jada wong regarding adherence and increasing to 90 day supply. She said she has asked at the pharmacy for 90 days but states it's not permitted. I advised her I would outreach to PCP for a new rx for 90 days to be sent to [facility] for next refill.

## 2019-05-25 VITALS
HEART RATE: 72 BPM | RESPIRATION RATE: 18 BRPM | SYSTOLIC BLOOD PRESSURE: 148 MMHG | HEIGHT: 55 IN | OXYGEN SATURATION: 98 % | TEMPERATURE: 97.8 F | WEIGHT: 105 LBS | BODY MASS INDEX: 24.3 KG/M2 | DIASTOLIC BLOOD PRESSURE: 78 MMHG

## 2019-05-25 LAB
BASOPHILS ABSOLUTE: 0.02 E9/L (ref 0–0.2)
BASOPHILS RELATIVE PERCENT: 0.4 % (ref 0–2)
EOSINOPHILS ABSOLUTE: 0.26 E9/L (ref 0.05–0.5)
EOSINOPHILS RELATIVE PERCENT: 4.6 % (ref 0–6)
HCT VFR BLD CALC: 32.3 % (ref 34–48)
HCT VFR BLD CALC: 32.5 % (ref 34–48)
HCT VFR BLD CALC: 33 % (ref 34–48)
HEMOGLOBIN: 10.3 G/DL (ref 11.5–15.5)
HEMOGLOBIN: 10.5 G/DL (ref 11.5–15.5)
HEMOGLOBIN: 10.6 G/DL (ref 11.5–15.5)
IMMATURE GRANULOCYTES #: 0.02 E9/L
IMMATURE GRANULOCYTES %: 0.4 % (ref 0–5)
LYMPHOCYTES ABSOLUTE: 1.54 E9/L (ref 1.5–4)
LYMPHOCYTES RELATIVE PERCENT: 27.3 % (ref 20–42)
MCH RBC QN AUTO: 27.8 PG (ref 26–35)
MCHC RBC AUTO-ENTMCNC: 31.7 % (ref 32–34.5)
MCV RBC AUTO: 87.6 FL (ref 80–99.9)
MONOCYTES ABSOLUTE: 0.61 E9/L (ref 0.1–0.95)
MONOCYTES RELATIVE PERCENT: 10.8 % (ref 2–12)
NEUTROPHILS ABSOLUTE: 3.2 E9/L (ref 1.8–7.3)
NEUTROPHILS RELATIVE PERCENT: 56.5 % (ref 43–80)
PDW BLD-RTO: 14.6 FL (ref 11.5–15)
PLATELET # BLD: 200 E9/L (ref 130–450)
PMV BLD AUTO: 9.2 FL (ref 7–12)
RBC # BLD: 3.71 E12/L (ref 3.5–5.5)
WBC # BLD: 5.7 E9/L (ref 4.5–11.5)

## 2019-05-25 PROCEDURE — 96376 TX/PRO/DX INJ SAME DRUG ADON: CPT

## 2019-05-25 PROCEDURE — 97161 PT EVAL LOW COMPLEX 20 MIN: CPT

## 2019-05-25 PROCEDURE — 6370000000 HC RX 637 (ALT 250 FOR IP): Performed by: STUDENT IN AN ORGANIZED HEALTH CARE EDUCATION/TRAINING PROGRAM

## 2019-05-25 PROCEDURE — 85014 HEMATOCRIT: CPT

## 2019-05-25 PROCEDURE — 85025 COMPLETE CBC W/AUTO DIFF WBC: CPT

## 2019-05-25 PROCEDURE — 96361 HYDRATE IV INFUSION ADD-ON: CPT

## 2019-05-25 PROCEDURE — 6360000002 HC RX W HCPCS: Performed by: STUDENT IN AN ORGANIZED HEALTH CARE EDUCATION/TRAINING PROGRAM

## 2019-05-25 PROCEDURE — 97535 SELF CARE MNGMENT TRAINING: CPT | Performed by: OCCUPATIONAL THERAPIST

## 2019-05-25 PROCEDURE — 85018 HEMOGLOBIN: CPT

## 2019-05-25 PROCEDURE — 36415 COLL VENOUS BLD VENIPUNCTURE: CPT

## 2019-05-25 PROCEDURE — C9113 INJ PANTOPRAZOLE SODIUM, VIA: HCPCS | Performed by: STUDENT IN AN ORGANIZED HEALTH CARE EDUCATION/TRAINING PROGRAM

## 2019-05-25 PROCEDURE — G0378 HOSPITAL OBSERVATION PER HR: HCPCS

## 2019-05-25 PROCEDURE — 97165 OT EVAL LOW COMPLEX 30 MIN: CPT | Performed by: OCCUPATIONAL THERAPIST

## 2019-05-25 PROCEDURE — 2580000003 HC RX 258: Performed by: STUDENT IN AN ORGANIZED HEALTH CARE EDUCATION/TRAINING PROGRAM

## 2019-05-25 PROCEDURE — 99238 HOSP IP/OBS DSCHRG MGMT 30/<: CPT | Performed by: SURGERY

## 2019-05-25 RX ORDER — PANTOPRAZOLE SODIUM 40 MG/1
40 TABLET, DELAYED RELEASE ORAL
Qty: 60 TABLET | Refills: 2 | Status: SHIPPED | OUTPATIENT
Start: 2019-05-25 | End: 2020-01-01

## 2019-05-25 RX ORDER — LEVOFLOXACIN 750 MG/1
750 TABLET ORAL DAILY
Qty: 2 TABLET | Refills: 0 | Status: SHIPPED | OUTPATIENT
Start: 2019-05-25 | End: 2019-05-27

## 2019-05-25 RX ADMIN — LISINOPRIL 10 MG: 10 TABLET ORAL at 08:43

## 2019-05-25 RX ADMIN — SUCRALFATE 1 G: 1 TABLET ORAL at 08:44

## 2019-05-25 RX ADMIN — PANTOPRAZOLE SODIUM 40 MG: 40 INJECTION, POWDER, FOR SOLUTION INTRAVENOUS at 08:43

## 2019-05-25 RX ADMIN — SUCRALFATE 1 G: 1 TABLET ORAL at 17:15

## 2019-05-25 RX ADMIN — SUCRALFATE 1 G: 1 TABLET ORAL at 14:11

## 2019-05-25 RX ADMIN — Medication 10 ML: at 08:43

## 2019-05-25 RX ADMIN — CEFTRIAXONE SODIUM 1 G: 1 INJECTION, POWDER, FOR SOLUTION INTRAMUSCULAR; INTRAVENOUS at 05:41

## 2019-05-25 ASSESSMENT — PAIN SCALES - GENERAL
PAINLEVEL_OUTOF10: 0
PAINLEVEL_OUTOF10: 0

## 2019-05-25 NOTE — PLAN OF CARE
Problem: Falls - Risk of:  Goal: Will remain free from falls  Description  Will remain free from falls  5/25/2019 0036 by David Lujan RN  Outcome: Met This Shift     Problem:  Bowel Function - Altered:  Goal: Bowel elimination is within specified parameters  Description  Bowel elimination is within specified parameters  5/25/2019 0036 by David Lujan RN  Outcome: Not Met This Shift

## 2019-05-25 NOTE — DISCHARGE SUMMARY
6. 2/20.5 8.8/27. 9       Treatment: Serial lab work, blood product replacement     Thank you   Sylvie BROWN, RN-BC  Clinical Documentation Improvement\"      Anemia due to chronic blood loss, rest as above      Consults:   IP CONSULT TO GENERAL SURGERY  IP CONSULT TO HOME CARE NEEDS    Significant Diagnostic Studies:   Xr Chest Portable    Result Date: 2019  Patient MRN:  [id number] : 1930 Age: 80 years Gender: Female Order Date:  2019 3:30 PM EXAM: XR CHEST PORTABLE COMPARISON: 2017 INDICATION:  gi bleed gi bleed FINDINGS: Chronic interstitial changes are present bilaterally. No airspace opacity or evidence pleural effusion. No evidence pneumothorax. The heart is normal in size. There is evidence of kyphoplasty at multiple spinal levels. No evidence of pneumonia or pleural effusion. Discharge Exam:  Constitutional: She appears well-developed. HENT:   Head: Atraumatic. Eyes: EOM are normal.   Neck: Neck supple. Cardiovascular: Normal rate. Pulmonary/Chest: Effort normal. No respiratory distress. Abdominal: Soft. She exhibits no distension. There is no tenderness. There is no rebound and no guarding. Musculoskeletal: Normal range of motion. Neurological: She is alert. Skin: Skin is warm. Psychiatric: She has a normal mood and affect. Disposition: home    In process/preliminary results:  Outstanding Order Results     No orders found from 2019 to 2019.           Patient Instructions:   Discharge Medication List as of 2019  4:41 PM           Details   pantoprazole (PROTONIX) 40 MG tablet Take 1 tablet by mouth 2 times daily (before meals), Disp-60 tablet, R-2Normal      levofloxacin (LEVAQUIN) 750 MG tablet Take 1 tablet by mouth daily for 2 days, Disp-2 tablet, R-0Normal              Details   Fexofenadine-Pseudoephedrine (ALLEGRA-D 24 HOUR PO) Take by mouthHistorical Med      diphenhydrAMINE HCl, Sleep, (UNISOM SLEEPGELS PO) Take by mouth Indications: unsure of doseHistorical Med      vitamin C (ASCORBIC ACID) 500 MG tablet Take 500 mg by mouth dailyHistorical Med      calcium carbonate (OSCAL) 500 MG TABS tablet Take 500 mg by mouth dailyHistorical Med      sucralfate (CARAFATE) 1 GM tablet Take 1 tablet by mouth 4 times daily, Disp-120 tablet, R-1Normal      vitamin D (CHOLECALCIFEROL) 1000 UNIT TABS tablet Take 1,000 Units by mouth dailyHistorical Med      lisinopril (PRINIVIL;ZESTRIL) 10 MG tablet Take 1 tablet by mouth daily, Disp-30 tablet, R-5Normal      alendronate (FOSAMAX) 70 MG tablet Take 1 tablet by mouth every 7 days, Disp-12 tablet, R-3Normal             Peptic Ulcer Disease: Care Instructions  Your Care Instructions    Peptic ulcers are sores on the inside of the stomach or the small intestine (such as a duodenal ulcer). They are most often caused by an infection with bacteria or from use of nonsteroidal anti-inflammatory drugs (NSAIDs). NSAIDs include aspirin, ibuprofen (Advil), and naproxen (Aleve). Your doctor may have prescribed medicine to reduce stomach acid. You also may need to take antibiotics if your peptic ulcers are caused by an infection. You can help yourself heal and keep ulcers from coming back by making some changes in your lifestyle. Quit smoking. Limit alcohol. Follow-up care is a key part of your treatment and safety. Be sure to make and go to all appointments, and call your doctor if you are having problems. It's also a good idea to know your test results and keep a list of the medicines you take. How can you care for yourself at home? · Be safe with medicines. Take your medicines exactly as prescribed. Call your doctor if you think you are having a problem with your medicine. · Do not take aspirin or other NSAIDs such as ibuprofen (Advil or Motrin) or naproxen (Aleve). Ask your doctor what you can take for pain. · Do not smoke. Smoking can make ulcers worse.  If you need help quitting, talk to your doctor about stop-smoking programs and medicines. These can increase your chances of quitting for good. · Drink in moderation or avoid drinking alcohol. · Eat a balanced diet of small, frequent meals. See a dietitian if you need help planning your meals. When should you call for help? [id number] anytime you think you may need emergency care. For example, call if:    · You vomit blood or what looks like coffee grounds.     · You pass maroon or very bloody stools.    Call your doctor now or seek immediate medical care if:    · You have new or worse belly pain.     · Your stools are black and look like tar, or they have streaks of blood.     · You vomit.    Watch closely for changes in your health, and be sure to contact your doctor if:    · You do not get better as expected. Where can you learn more? Go to https://DailyDealpePeap.co.[employer]. org and sign in to your [employer] account. Enter R319 in the [facility] box to learn more about \"Peptic Ulcer Disease: Care Instructions. \"     If you do not have an account, please click on the \"Sign Up Now\" link. Current as of: November 7, 2018  Content Version: 12.0  © 8297-3805 Healthwise, Incorporated. Care instructions adapted under license by [facility] ([facility]). If you have questions about a medical condition or this instruction, always ask your healthcare professional. [place] [address] any warranty or liability for your use of this information.        Follow up:   Kyrie Bryant, APRN - [facility]  [address] [address] [phone]      Please see Dr. Earlene Dominguez for [address] MD  [address] [facility] [phone]    Go on 5/31/2019  Scheduled for 11 AM, VERY IMPORTANT to keep this hospital follow up appointment, please see Dr. Earlene Dominguez for [address], [place]  [address] [facility] [phone]             Signed:  Mahnaz Wiggins Lashell Dean MD  5/29/2019  7:36 AM

## 2019-05-25 NOTE — PROGRESS NOTES
Physical Therapy    Facility/Department: [facility] MED SURG ONC  Initial Assessment    NAME: Prateek Smith  : 1930  MRN: [id number]    Date of Service: 2019  Evaluating Therapist: Ritu Calderón PT, DPT      Room #: 6210-A  DIAGNOSIS: GI bleed  PRECAUTIONS: falls    Social:  Pt lives alone in an apartment complex, level entry with elevator access. Prior to admission pt walked without device, reports was functionally independent. Pt owns [employer] and [employer] which she refuses to use. Daughter available for intermittent support. Initial Evaluation  Date: 19 Treatment  Date:   Short Term/ Long Term   Goals   AM-PAC raw score 23/24     Was pt agreeable to Eval/treatment? yes     Does pt have pain? No c/o pain     Bed Mobility  Modified Independent all aspects  Independent   Transfers Sit to stand: Modified Independent  Stand to sit: Modified Independent  Stand pivot: Modified Independent  Independent   Ambulation    200 feet with without AD with supervision    Short distances in hospital room without AD with Modified Independent  400 feet without AD Independently   Stair negotiation: ascended and descended  NT  12 steps with 1 rail with Supervision     Pt is alert and Oriented x3   BLE ROM is WFL. BLE strength is grossly 4/5. Balance: static and dynamic standing balance Modified independent without AD  Sensation: grossly intact  Edema: none noted  Endurance: fair  Skin was inspected: grossly intact  Chair alarm: NA     ASSESSMENT  Pt displays functional ability as noted in the objective portion of this evaluation. Comments/Treatment:  Pt supine in bed with daughter present upon room entry, agreeable to session. Mild unsteadiness without LOB noted with mobility. Pt exhibits good safety awareness. Discussed possible discharge disposition with daughter and pt. Pt would benefit from HHPT for home safety assessment, pt and daughter agreeable. RN notified. Pt tolerated session without complaints.      Patient education  Pt educated on safe activity progression in home    Patient response to education:   Pt verbalized understanding Pt demonstrated skill Pt requires further education in this area   yes NA no     Rehab potential is Good for reaching above PT goals. Pts/ family goals   1. Return home    Patient and or family understand(s) diagnosis, prognosis, and plan of care. PLAN  PT care will be provided in accordance with the objectives noted above. Whenever appropriate, clear delegation orders will be provided for nursing staff. Exercises and functional mobility practice will be used as well as appropriate assistive devices or modalities to obtain goals. Patient and family education will also be administered as needed. Frequency of treatments will be 2-5x/week x 1 day .     Time in: 1330  Time out: 1300 [place], [place], DPT  [id number]

## 2019-05-25 NOTE — PROGRESS NOTES
[facility]  ATTENDING PHYSICIAN SURGERY PROGRESS NOTE     I have examined the patient, reviewed the record, and discussed the case with the APN/  Resident. I have reviewed all relevant labs and imaging data. The following summarizes my clinical findings and independent assessment. Chief Complaint   Patient presents with    Rectal Bleeding     seen today at dr Fide Valle office for follow up regarding gi bleed per ems they wanted her reevaluated further due to still having bloody stools skin color pale upon arrival complaining of occasional sob and fatigue       S: Patient resting comfortably. Denies bloody BM.     O:  Physical Exam   Constitutional: She appears well-developed. HENT:   Head: Atraumatic. Eyes: EOM are normal.   Neck: Neck supple. Cardiovascular: Normal rate. Pulmonary/Chest: Effort normal. No respiratory distress. Abdominal: Soft. She exhibits no distension. There is no tenderness. There is no rebound and no guarding. Musculoskeletal: Normal range of motion. Neurological: She is alert. Skin: Skin is warm. Psychiatric: She has a normal mood and affect. Assessment   Active Problems:    GIB (gastrointestinal bleeding)    PUD (peptic ulcer disease)  Resolved Problems:    * No resolved hospital problems.  *      Plan   PUD   Pain control - tylenol if needed    Hep lock  Aggressive pulmonary hygiene   Rocephin for UTI - UC pending - send home 1 day levaquin   Hb stable   PCDs  PIV  Home lisinipril  , Alendronate Q 7 days held   PPI BID and carafate    No glycemic issues     Dispo - DC today     Emmanuelle Miller MD

## 2019-05-25 NOTE — PROGRESS NOTES
Supervision - short household distance to/from bathroom  Independent    Balance Sitting:     Static:  Independent    Dynamic:Supervision  Standing: Supervision     Activity Tolerance Fair  Good for completion of ADL tasks   Visual/  Perceptual Glasses:                    Strength ROM Additional Info:    RUE  4-/5  WFL  good  and wfl FMC/dexterity noted during ADL tasks       LUE 4-/5  WFL good  and wfl FMC/dexterity noted during ADL tasks         Hearing: [facility] Deaf L ear  Sensation:  No c/o numbness or tingling   Tone: WFL   Edema: none observed                            Comments/Treatment: Upon arrival, patient supine in bed. Patient able to follow instruction. Patient instructed on safety w/ ADLs and functional transfers. Reinforcement of cues required. At end of session, patient remained in bedside chair with call light and phone within reacht. Pt would benefit from continued skilled OT to increase safety and independence with completion of ADL/IADL tasks for functional independence and quality of life.     Eval Complexity: Low    Assessment of current deficits   Functional mobility [x]  ADLs [x] Strength [x]  Cognition []  Functional transfers  [x] IADLs [x] Safety Awareness [x]  Endurance [x]  Fine Motor Coordination [] Balance [x] Vision/perception [] Sensation []   Gross Motor Coordination [] ROM [] Delirium []                  Motor Control []    Plan of Care:   ADL retraining [x]   Equipment needs [x]   Neuromuscular re-education [x] Energy Conservation Techniques [x]  Functional Transfer training [x] Patient and/or Family Education [x]  Functional Mobility training [x]  Environmental Modifications [x]  Cognitive re-training []   Compensatory techniques for ADLs [x]  Splinting Needs []   Positioning to improve overall function [x]   Therapeutic Activity [x]  Therapeutic Exercise  [x]  Visual/Perceptual: []    Delirium prevention/treatment  []   Other:  []    Rehab Potential: Good for established goals     Patient / Family Goal: Patient to increase strength, remain independent      Patient and/or family were instructed on functional diagnosis, prognosis/goals and OT plan of care. Demonstrated good understanding.       Low Evaluation + 10 timed treatment minutes  Tx Time in: 130p  Tx Time out: 150p    Evaluation time includes thorough review of current medical information, gathering information on past medical history/social history and prior level of function, completion of standardized testing/informal observation of tasks, assessment of data, and development of POC/Goals      Martin General OTR/L; [id number]

## 2019-05-26 ENCOUNTER — CARE COORDINATION (OUTPATIENT)
Dept: CASE MANAGEMENT | Age: 84
End: 2019-05-26

## 2019-05-26 NOTE — CARE COORDINATION
Heather 45 Transitions Initial Follow Up Call    Call within 2 business days of discharge: Yes    Patient: Bo Keith Patient : 1930   MRN: [id number]  Reason for Admission: GIB   Discharge Date: 19 RARS: Readmission Risk Score: 16      Last Discharge [facility]       Complaint Diagnosis Description Type Department Provider    19 Rectal Bleeding Gastrointestinal hemorrhage with hematemesis ED to Hosp-Admission (Discharged) (ADMIT) GRACIELA 8W Kesha Morton MD; Thierry Needs. .. Spoke with: [address]: YOLY  Non-face-to-face services provided:  Scheduled appointment with PCP-verified that her daughter will take her to her appt on 19 with Dr. Jarad Lopez and reviewed discharge summary and/or continuity of care documents    Care Transitions 24 Hour Call    Do you have any ongoing symptoms?:  No  Do you have a copy of your discharge instructions?:  Yes  Do you have all of your prescriptions and are they filled?:  No  Have you been contacted by a [email or web address]?:  No  Have you scheduled your follow up appointment?:  Yes  How are you going to get to your appointment?:  Car - family or friend to transport  Were you discharged with any Home Care or Post Acute Services:  Yes  Post Acute Services:  Home Health (Comment: [facility] )  Do you feel like you have everything you need to keep you well at home?:  Yes  Care Transitions Interventions  No Identified Needs       Spoke with Regis Mon for initial care transition call post hospital discharge. She reports that she is feeling \"better\" today. She reports that she is tolerating eating and drinking without issue. She has not moved her bowels since discharge to know if there is blood in it. She stated she is resting and taking it easy today. Med review not completed at this time per [facility] request, [id number] not entered.  Kittitas Mon reports that [facility] was closed by the time she was discharged yesterday therefore her

## 2019-05-27 LAB
ORGANISM: ABNORMAL
URINE CULTURE, ROUTINE: ABNORMAL
URINE CULTURE, ROUTINE: ABNORMAL

## 2019-05-28 ENCOUNTER — TELEPHONE (OUTPATIENT)
Dept: FAMILY MEDICINE CLINIC | Age: 84
End: 2019-05-28

## 2019-05-28 RX ORDER — LISINOPRIL 10 MG/1
10 TABLET ORAL DAILY
Qty: 90 TABLET | Refills: 1 | Status: SHIPPED | OUTPATIENT
Start: 2019-05-28 | End: 2019-12-11 | Stop reason: SDUPTHER

## 2019-05-28 NOTE — TELEPHONE ENCOUNTER
Sherri with [facility] called she was discharge over the weekend from [facility]. They discharged her with PT/OT orders, please advise if PCP will sign orders, any questions please call [phone]

## 2019-05-28 NOTE — TELEPHONE ENCOUNTER
Donovan Rangel, APRN - CNP, patient interested in 90-day supply.     LOV: 4/4/19  Next: 5/31/19 (hospital f/u)    Thank you,  Juliet Lopez, PharmD, [address] [facility]  Direct: [phone]  Department, toll free: [phone], option 7

## 2019-05-29 ENCOUNTER — CARE COORDINATION (OUTPATIENT)
Dept: CASE MANAGEMENT | Age: 84
End: 2019-05-29

## 2019-05-29 NOTE — CARE COORDINATION
Heather 45 Transitions Follow Up Call    2019    Patient: Juan Cox  Patient : 1930   MRN: [id number]  Reason for Admission: Gastrointestinal hemorrhage with hematemesis  Discharge Date: 19 RARS: Readmission Risk Score: 16         Spoke with: Celia Diaz, patient    Care Transitions Subsequent and Final Call    Subsequent and Final Calls  Do you have any ongoing symptoms?:  No  Do you currently have any active services?:  Yes  Are you currently active with any services?:  Home Health  Do you have any needs or concerns that I can assist you with?:  No  Identified Barriers:  None  Care Transitions Interventions  No Identified Needs  Other Interventions:          Spoke with patient for follow up care transition call. Patient reports she \"doesn't feel right\" due to chronic neck and back pain from fall. Patient is unable to rate pain, reports it is uncomfortable. Patient is unsure if she is taking anything for the discomfort; patient's daughter fills mediplanner and manages medications. Patient is unsure if she has completed Levaquin rx. Patient states her daughter fills the pill box and she takes the medications. Patient reports 100% compliance taking medications patient's daughter has placed int he mediplanner. Patient denies abdominal pain, nausea, vomiting, black stools and shorntess of breath. Patient denies any tobacco or alcohol use. Patient states she eats three meals per day including cereal for breakfast and tv dinners. Patient reports Natalie Kelly comes to her home two days per week from [phone] to assist with patient's needs. Patient's daughter transports patient to doctor appointments. This CTC verified appointment with Dr. Mary Cullen on 19. Patient is not aware of appt with Dr. Iman Servin. Patient reports her daughter picked up her medications. Patient reports she has not received a call from [facility] care. This CTC called Sara. No answer.   Left message on voicemail with patient's info and this CTC's info and call back number. Awaiting return call.       Follow Up  Future Appointments   Date Time Provider Haley Peña   5/31/2019 11:00 AM [facility]       Sandhya Lewis, CEDRICK

## 2019-05-31 ENCOUNTER — HOSPITAL ENCOUNTER (OUTPATIENT)
Age: 84
Discharge: HOME | End: 2019-06-02
Payer: COMMERCIAL

## 2019-05-31 ENCOUNTER — OFFICE VISIT (OUTPATIENT)
Dept: FAMILY MEDICINE CLINIC | Age: 84
End: 2019-05-31
Payer: COMMERCIAL

## 2019-05-31 VITALS
DIASTOLIC BLOOD PRESSURE: 79 MMHG | OXYGEN SATURATION: 95 % | TEMPERATURE: 96.8 F | WEIGHT: 105 LBS | SYSTOLIC BLOOD PRESSURE: 138 MMHG | RESPIRATION RATE: 16 BRPM | HEIGHT: 55 IN | HEART RATE: 89 BPM | BODY MASS INDEX: 24.3 KG/M2

## 2019-05-31 DIAGNOSIS — N18.30 CHRONIC KIDNEY DISEASE, STAGE III (MODERATE) (HCC): ICD-10-CM

## 2019-05-31 DIAGNOSIS — D62 ACUTE BLOOD LOSS ANEMIA: Primary | ICD-10-CM

## 2019-05-31 DIAGNOSIS — M80.00XS AGE-RELATED OSTEOPOROSIS WITH CURRENT PATHOLOGICAL FRACTURE, SEQUELA: ICD-10-CM

## 2019-05-31 DIAGNOSIS — R10.84 GENERALIZED ABDOMINAL PAIN: ICD-10-CM

## 2019-05-31 DIAGNOSIS — D62 ACUTE BLOOD LOSS ANEMIA: ICD-10-CM

## 2019-05-31 LAB
ANION GAP SERPL CALCULATED.3IONS-SCNC: 15 MMOL/L (ref 7–16)
BASOPHILS ABSOLUTE: 0.03 E9/L (ref 0–0.2)
BASOPHILS RELATIVE PERCENT: 0.4 % (ref 0–2)
BUN BLDV-MCNC: 32 MG/DL (ref 8–23)
CALCIUM SERPL-MCNC: 9.7 MG/DL (ref 8.6–10.2)
CHLORIDE BLD-SCNC: 103 MMOL/L (ref 98–107)
CO2: 22 MMOL/L (ref 22–29)
CREAT SERPL-MCNC: 1.5 MG/DL (ref 0.5–1)
EOSINOPHILS ABSOLUTE: 0.11 E9/L (ref 0.05–0.5)
EOSINOPHILS RELATIVE PERCENT: 1.6 % (ref 0–6)
GFR AFRICAN AMERICAN: 40
GFR NON-AFRICAN AMERICAN: 33 ML/MIN/1.73
GLUCOSE BLD-MCNC: 105 MG/DL (ref 74–99)
HCT VFR BLD CALC: 37.5 % (ref 34–48)
HEMOGLOBIN: 11.5 G/DL (ref 11.5–15.5)
IMMATURE GRANULOCYTES #: 0.03 E9/L
IMMATURE GRANULOCYTES %: 0.4 % (ref 0–5)
LYMPHOCYTES ABSOLUTE: 2.42 E9/L (ref 1.5–4)
LYMPHOCYTES RELATIVE PERCENT: 34.2 % (ref 20–42)
MCH RBC QN AUTO: 28.3 PG (ref 26–35)
MCHC RBC AUTO-ENTMCNC: 30.7 % (ref 32–34.5)
MCV RBC AUTO: 92.4 FL (ref 80–99.9)
MONOCYTES ABSOLUTE: 0.71 E9/L (ref 0.1–0.95)
MONOCYTES RELATIVE PERCENT: 10 % (ref 2–12)
NEUTROPHILS ABSOLUTE: 3.78 E9/L (ref 1.8–7.3)
NEUTROPHILS RELATIVE PERCENT: 53.4 % (ref 43–80)
PDW BLD-RTO: 15.7 FL (ref 11.5–15)
PLATELET # BLD: 226 E9/L (ref 130–450)
PMV BLD AUTO: 9.9 FL (ref 7–12)
POTASSIUM SERPL-SCNC: 4.9 MMOL/L (ref 3.5–5)
RBC # BLD: 4.06 E12/L (ref 3.5–5.5)
SODIUM BLD-SCNC: 140 MMOL/L (ref 132–146)
WBC # BLD: 7.1 E9/L (ref 4.5–11.5)

## 2019-05-31 PROCEDURE — 99214 OFFICE O/P EST MOD 30 MIN: CPT | Performed by: FAMILY MEDICINE

## 2019-05-31 PROCEDURE — 80048 BASIC METABOLIC PNL TOTAL CA: CPT

## 2019-05-31 PROCEDURE — 85025 COMPLETE CBC W/AUTO DIFF WBC: CPT

## 2019-05-31 PROCEDURE — 1111F DSCHRG MED/CURRENT MED MERGE: CPT | Performed by: FAMILY MEDICINE

## 2019-05-31 RX ORDER — ALENDRONATE SODIUM 70 MG/1
70 TABLET ORAL
Qty: 12 TABLET | Refills: 3 | Status: SHIPPED | OUTPATIENT
Start: 2019-05-31 | End: 2019-12-11 | Stop reason: SDUPTHER

## 2019-05-31 RX ORDER — SUCRALFATE 1 G/1
1 TABLET ORAL 4 TIMES DAILY
Qty: 120 TABLET | Refills: 1 | Status: SHIPPED | OUTPATIENT
Start: 2019-05-31 | End: 2019-08-06 | Stop reason: SDUPTHER

## 2019-05-31 NOTE — PROGRESS NOTES
Post-Discharge Transitional Care Management Services or Hospital Follow Up      Deloris Valdivia   YOB: 1930    Date of Office Visit:  5/31/2019  Date of Hospital Admission: 5/23/19  Date of Hospital Discharge: 5/25/19  Risk of hospital readmission (high >=14%.  Medium >=10%) :Readmission Risk Score: 16      Care management risk score Rising risk (score 2-5) and Complex Care (Scores >=6): 9     Non face to face  following discharge, date last encounter closed (first attempt may have been earlier): 5/26/2019 10:44 AM    Call initiated 2 business days of discharge: Yes    Patient Active Problem List   Diagnosis    Hypertension    Osteoporosis    Primary osteoarthritis involving multiple joints    Closed subluxation of cervical spine    Compression fracture of thoracic spine, non-traumatic (HCC)    Kidney stone    Abdominal pain    Acute blood loss anemia    Acute renal failure superimposed on stage 3 chronic kidney disease (HCC)    Lactic acidosis    GIB (gastrointestinal bleeding)    PUD (peptic ulcer disease)       Allergies   Allergen Reactions    Penicillins Swelling    Celebrex [Celecoxib] Nausea Only     Headache    Demerol Hcl [Meperidine] Nausea And Vomiting    Dust Mite Extract      Dust, grass, and trees    Percocet [Oxycodone-Acetaminophen] Nausea And Vomiting    Percodan [Oxycodone-Aspirin] Nausea And Vomiting    Propulsid [Cisapride] Other (See Comments)     Headache       Medications listed as ordered at the time of discharge from hospital   [facility] Medication Instructions LIANNE:    Printed on:05/31/19 113   Medication Information                      alendronate (FOSAMAX) 70 MG tablet  Take 1 tablet by mouth every 7 days             calcium carbonate (OSCAL) 500 MG TABS tablet  Take 500 mg by mouth daily             diphenhydrAMINE HCl, Sleep, (UNISOM SLEEPGELS PO)  Take by mouth Indications: unsure of dose             Fexofenadine-Pseudoephedrine (ALLEGRA-D 24 HOUR PO)  Take by mouth             lisinopril (PRINIVIL;ZESTRIL) 10 MG tablet  Take 1 tablet by mouth daily             pantoprazole (PROTONIX) 40 MG tablet  Take 1 tablet by mouth 2 times daily (before meals)             sucralfate (CARAFATE) 1 GM tablet  Take 1 tablet by mouth 4 times daily             vitamin C (ASCORBIC ACID) 500 MG tablet  Take 500 mg by mouth daily             vitamin D (CHOLECALCIFEROL) 1000 UNIT TABS tablet  Take 1,000 Units by mouth daily                   Medications marked \"taking\" at this time  No outpatient medications have been marked as taking for the 5/31/19 encounter (Office Visit) with Mendy Villafana MD.        Medications patient taking as of now reconciled against medications ordered at time of hospital discharge: Yes    Chief Complaint   Patient presents with   [address] from Hospital       History of Present illness - Follow up of Hospital diagnosis(es) (per Dr. Kalen Ramirez): EVAN Hickman is a 80 y.o. female who presents for evaluation of melena and weakness x 3 weeks. Was seen at clinic and sent to ED for evaluation due to complaints of epigastric and RLQ pain, weakness, and pallor. Seen 2 months prior for melena, had EGD on 3/29/2019 by Dr. Adeel Kwok showed large duodenal ulcer, large hiatal hernia with ulcer, non-bleeding, negative biopsy, negative H. Pylori. Was discharged on PPI and Carafate, ran out of PPI and did not call for refill. Inpatient course per Dr. Adeel Kwok:   [facility] Course/Procedures/Operation/treatments:   5/23: admitted through ED from office for anemia, presyncope  5/24: Hgb stable  5/25: Hgb stable at 0000, advance diet, possible discharge     QUERY RESPONSES:  \"Patient admitted with GI bleed noted to have abnormal BUN/Cr/GFR. If possible, please document in progress notes and d/c summary if you are evaluating and/or treating any of the following:     GILMER   CKD (please specify stage)  GILMER on CKD   Other   Unable to Temporal    SpO2: 95%    Weight: 105 lb (47.6 kg)    Height: 4' 7\" (1.397 m)      Body mass index is 24.4 kg/m². Wt Readings from Last 3 Encounters:   05/31/19 105 lb (47.6 kg)   05/23/19 105 lb (47.6 kg)   05/23/19 106 lb (48.1 kg)     BP Readings from Last 3 Encounters:   05/31/19 138/79   05/25/19 (!) 148/78   05/23/19 122/74        Physical Exam:  General Appearance: alert and oriented to person, place and time, well developed and well- nourished, in no acute distress  Head: normocephalic and atraumatic  Eyes: pupils equal, round, and reactive to light, extraocular eye movements intact, conjunctivae normal  ENT: tympanic membrane, external ear and ear canal normal bilaterally, nose without deformity, nasal mucosa and turbinates normal without polyps  Neck: supple with slight posterior ttp without mass, no thyromegaly or thyroid nodules, no cervical lymphadenopathy  Pulmonary/Chest: clear to auscultation bilaterally- no wheezes, rales or rhonchi, normal air movement, no respiratory distress  Cardiovascular: normal rate, regular rhythm, normal S1 and S2, no murmurs, rubs, clicks, or gallops, distal pulses intact, no carotid bruits  Abdomen: soft, non-tender, non-distended, normal bowel sounds, no masses or organomegaly  Extremities: no cyanosis, clubbing or edema  Musculoskeletal: normal range of motion, no joint swelling, deformity or tenderness      Ivonne Allan was seen today for follow-up from hospital.    Diagnoses and all orders for this visit:    Acute blood loss anemia  -     CBC Auto Differential; Future    Age-related osteoporosis with current pathological fracture, sequela  -     alendronate (FOSAMAX) 70 MG tablet; Take 1 tablet by mouth every 7 days    Chronic kidney disease, stage III (moderate) ([facility])  -     BASIC METABOLIC PANEL; Future    Generalized abdominal pain  -     sucralfate (CARAFATE) 1 GM tablet;  Take 1 tablet by mouth 4 times daily           Medical Decision Making: moderate complexity

## 2019-06-03 ENCOUNTER — CARE COORDINATION (OUTPATIENT)
Dept: CASE MANAGEMENT | Age: 84
End: 2019-06-03

## 2019-06-03 ENCOUNTER — TELEPHONE (OUTPATIENT)
Dept: SURGERY | Age: 84
End: 2019-06-03

## 2019-06-03 RX ORDER — SULFAMETHOXAZOLE AND TRIMETHOPRIM 800; 160 MG/1; MG/1
1 TABLET ORAL 2 TIMES DAILY
Qty: 10 TABLET | Refills: 0 | Status: SHIPPED | OUTPATIENT
Start: 2019-06-03 | End: 2019-06-08

## 2019-06-03 NOTE — TELEPHONE ENCOUNTER
----- Message from Nilam Henriquez MD sent at 6/3/2019 10:48 AM EDT -----  Please call the patient to see if she is having any urinary symptoms. We were treating her for a UTI and the cultures came back that she was on antibiotics that her bacteria were not sensitive to if she has no symptoms then it is likely asymptomatic bacteruria ( which does not need to be treated) but if she is symptomatic I will need to prescribe her different antibiotics.

## 2019-06-04 ENCOUNTER — CARE COORDINATION (OUTPATIENT)
Dept: CASE MANAGEMENT | Age: 84
End: 2019-06-04

## 2019-06-04 NOTE — CARE COORDINATION
[facility]ather 45 Transitions Follow Up Call    2019    Patient: Kay Ly  Patient : 1930   MRN: [id number]  Reason for Admission: GIB   Discharge Date: 19 RARS: Readmission Risk Score: 12    Spoke with: [facility] Transitions Subsequent and Final Call    Subsequent and Final Calls  Do you have any ongoing symptoms?:  No  Have your medications changed?:  No  Do you have any questions related to your medications?:  No  Do you currently have any active services?:  Yes  Are you currently active with any services?:  Home Health  Do you have any needs or concerns that I can assist you with?:  No  Identified Barriers:  None  Care Transitions Interventions  No Identified Needs  Other Interventions:          Spoke with Harshaljennifer Fung for follow up care transition call. She reports that she is \"just fine today, status quo\". She denies any problems with moving her bowels and denies noticing any blood in her stool. She denies any issues with urination today as well. Her only complaint today is chronic neck, back, and knee pain. She stated that the nurse and therapist have both been out from [facility]. She stated she has a \"worker\" that comes 2 days per week and she is at her house today helping with any tasks she needs assistance with. 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 Harshal Kenton denies any needs, questions, or concerns at this time and is agreeable to this being the final outreach. Follow Up  No future appointments.     Lelo Maxwell RN

## 2019-06-04 NOTE — CARE COORDINATION
Heather 45 Transitions Follow Up Call    2019    Patient: Norma Ratliff  Patient : 1930   MRN: [id number]  Reason for Admission: Gastrointestinal hemorrhage with hematemesis  Discharge Date: 19 RARS: Readmission Risk Score: 16         Spoke with: No one    [place] attempt to reach the patient for sub Care Transition call post hospital discharge. Busy signal.    Follow Up  No future appointments.     Nate Rodriguez RN

## 2019-06-11 ENCOUNTER — TELEPHONE (OUTPATIENT)
Dept: PRIMARY CARE CLINIC | Age: 84
End: 2019-06-11

## 2019-06-11 NOTE — TELEPHONE ENCOUNTER
MA attempted to contact patient's daughter, Jefry Meneses, to see if they had ever picked up the prescription for Bactrim. No answer to phone so MA left message asking to call office back at [phone] to confirm the  of a prescription. MA apologized for the lack of communication and disconnect.      Electronically signed by Kaylyn Galvan on 6/11/19 at 1:01 PM

## 2019-06-12 ENCOUNTER — TELEPHONE (OUTPATIENT)
Dept: SURGERY | Age: 84
End: 2019-06-12

## 2019-06-12 NOTE — TELEPHONE ENCOUNTER
Patient's daughter, [place] Barba, returned phone call stating they did end up picking up that Bactrim ordered by Dr Hollie Francisco. Daughter states she appears to be doing well, she doesn't show normal signs of UTI's though so she's not sure. But states she continues to get in home PT which is helping build her strength and she's getting around just about as good as before they took her to  ED. [place] Barba informed to call the office if they have any problems or questions. Routing message to Dr Preston Colbert informatively.     Electronically signed by Frida Gomez on 6/12/19 at 10:54 AM

## 2019-06-21 ENCOUNTER — TELEPHONE (OUTPATIENT)
Dept: PHARMACY | Facility: CLINIC | Age: 84
End: 2019-06-21

## 2019-06-21 NOTE — TELEPHONE ENCOUNTER
Identified care gap per [employer]: LISINOPRIL   TAB 10MG   Per records, appears 30-day supply last filled 2019     Per Dispense report    Medication: LISINOPRIL   TAB 10MG  Last 3 fill dates: 2019, 2019, 2019  Day supply: 30, 30, 90  Refills remainin  Directions: Take 1 tablet by mouth daily  Insurance billed: unknown  Prescribing Provider: Nely LOBATO    No patient outreach planned at this time. Patient currently have a 90ds rx on file     [address] [facility], toll free: [phone], option 7    CLINICAL PHARMACY CONSULT: MED RECONCILIATION/REVIEW ADDENDUM    For Pharmacy Admin Tracking Only    PHSO: Yes  Total # of Interventions Recommended: 1  - New Order #: 0 New Medication Order Reason(s):  Adherence  - Maintenance Safety Lab Monitoring #: 1  - New Therapy Lab Monitoring #: 0  Recommended intervention potential cost savings: 0  Total Interventions Accepted: 0  Time Spent (min): [address]
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
## 2019-08-06 DIAGNOSIS — R10.84 GENERALIZED ABDOMINAL PAIN: ICD-10-CM

## 2019-08-06 RX ORDER — SUCRALFATE 1 G/1
1 TABLET ORAL 4 TIMES DAILY
Qty: 120 TABLET | Refills: 1 | Status: SHIPPED | OUTPATIENT
Start: 2019-08-06 | End: 2019-10-08 | Stop reason: SDUPTHER

## 2019-10-07 ENCOUNTER — TELEPHONE (OUTPATIENT)
Dept: FAMILY MEDICINE CLINIC | Age: 84
End: 2019-10-07

## 2019-10-07 DIAGNOSIS — G89.29 CHRONIC MIDLINE THORACIC BACK PAIN: ICD-10-CM

## 2019-10-07 DIAGNOSIS — M54.6 CHRONIC MIDLINE THORACIC BACK PAIN: ICD-10-CM

## 2019-10-07 DIAGNOSIS — R53.81 PHYSICAL DECONDITIONING: Primary | ICD-10-CM

## 2019-10-07 DIAGNOSIS — M48.54XS: ICD-10-CM

## 2019-10-07 RX ORDER — ACETAMINOPHEN AND CODEINE PHOSPHATE 300; 30 MG/1; MG/1
1 TABLET ORAL
Qty: 30 TABLET | Refills: 0 | Status: SHIPPED | OUTPATIENT
Start: 2019-10-07 | End: 2019-12-11 | Stop reason: SDUPTHER

## 2019-10-08 ENCOUNTER — TELEPHONE (OUTPATIENT)
Dept: FAMILY MEDICINE CLINIC | Age: 84
End: 2019-10-08

## 2019-10-08 DIAGNOSIS — R10.84 GENERALIZED ABDOMINAL PAIN: ICD-10-CM

## 2019-10-08 RX ORDER — SUCRALFATE 1 G/1
1 TABLET ORAL 4 TIMES DAILY
Qty: 120 TABLET | Refills: 1 | Status: SHIPPED | OUTPATIENT
Start: 2019-10-08 | End: 2019-12-11 | Stop reason: SDUPTHER

## 2019-11-21 ENCOUNTER — TELEPHONE (OUTPATIENT)
Dept: FAMILY MEDICINE CLINIC | Age: 84
End: 2019-11-21

## 2019-12-02 ENCOUNTER — TELEPHONE (OUTPATIENT)
Dept: FAMILY MEDICINE CLINIC | Age: 84
End: 2019-12-02

## 2019-12-11 ENCOUNTER — OFFICE VISIT (OUTPATIENT)
Dept: FAMILY MEDICINE CLINIC | Age: 84
End: 2019-12-11
Payer: COMMERCIAL

## 2019-12-11 VITALS
HEIGHT: 55 IN | DIASTOLIC BLOOD PRESSURE: 72 MMHG | BODY MASS INDEX: 24.58 KG/M2 | OXYGEN SATURATION: 98 % | WEIGHT: 106.2 LBS | HEART RATE: 89 BPM | SYSTOLIC BLOOD PRESSURE: 133 MMHG | RESPIRATION RATE: 16 BRPM

## 2019-12-11 DIAGNOSIS — R55 SYNCOPE, UNSPECIFIED SYNCOPE TYPE: ICD-10-CM

## 2019-12-11 DIAGNOSIS — M48.54XS: ICD-10-CM

## 2019-12-11 DIAGNOSIS — G89.29 CHRONIC MIDLINE THORACIC BACK PAIN: Primary | ICD-10-CM

## 2019-12-11 DIAGNOSIS — M80.00XS AGE-RELATED OSTEOPOROSIS WITH CURRENT PATHOLOGICAL FRACTURE, SEQUELA: ICD-10-CM

## 2019-12-11 DIAGNOSIS — M54.6 CHRONIC MIDLINE THORACIC BACK PAIN: Primary | ICD-10-CM

## 2019-12-11 DIAGNOSIS — R10.84 GENERALIZED ABDOMINAL PAIN: ICD-10-CM

## 2019-12-11 DIAGNOSIS — R42 DIZZINESS: ICD-10-CM

## 2019-12-11 DIAGNOSIS — I10 ESSENTIAL HYPERTENSION: ICD-10-CM

## 2019-12-11 DIAGNOSIS — N39.0 URINARY TRACT INFECTION IN FEMALE: ICD-10-CM

## 2019-12-11 LAB
BILIRUBIN, POC: NEGATIVE
BLOOD URINE, POC: NORMAL
CLARITY, POC: NORMAL
COLOR, POC: NORMAL
GLUCOSE URINE, POC: NEGATIVE
KETONES, POC: NEGATIVE
LEUKOCYTE EST, POC: NORMAL
NITRITE, POC: POSITIVE
PH, POC: 6
PROTEIN, POC: NEGATIVE
SPECIFIC GRAVITY, POC: 1.01
UROBILINOGEN, POC: NORMAL

## 2019-12-11 PROCEDURE — 99215 OFFICE O/P EST HI 40 MIN: CPT | Performed by: NURSE PRACTITIONER

## 2019-12-11 PROCEDURE — 81002 URINALYSIS NONAUTO W/O SCOPE: CPT | Performed by: NURSE PRACTITIONER

## 2019-12-11 RX ORDER — SUCRALFATE 1 G/1
1 TABLET ORAL 4 TIMES DAILY
Qty: 120 TABLET | Refills: 3 | Status: SHIPPED
Start: 2019-12-11 | End: 2020-01-01

## 2019-12-11 RX ORDER — ALENDRONATE SODIUM 70 MG/1
70 TABLET ORAL
Qty: 12 TABLET | Refills: 3 | Status: SHIPPED
Start: 2019-12-11 | End: 2020-01-01 | Stop reason: SDUPTHER

## 2019-12-11 RX ORDER — KETOTIFEN FUMARATE 0.025 %
DROPS OPHTHALMIC (EYE)
Qty: 90 BOTTLE | Refills: 3 | Status: SHIPPED | OUTPATIENT
Start: 2019-12-11 | End: 2020-01-01

## 2019-12-11 RX ORDER — LISINOPRIL 10 MG/1
10 TABLET ORAL DAILY
Qty: 90 TABLET | Refills: 1 | Status: SHIPPED
Start: 2019-12-11 | End: 2020-01-01 | Stop reason: SDUPTHER

## 2019-12-11 RX ORDER — CEPHALEXIN 500 MG/1
500 CAPSULE ORAL 2 TIMES DAILY
Qty: 20 CAPSULE | Refills: 0 | Status: SHIPPED | OUTPATIENT
Start: 2019-12-11 | End: 2019-01-01

## 2019-12-11 RX ORDER — DIAPER,BRIEF,ADULT, DISPOSABLE
EACH MISCELLANEOUS
Qty: 30 EACH | Refills: 5 | Status: SHIPPED | OUTPATIENT
Start: 2019-12-11 | End: 2020-01-01

## 2019-12-11 RX ORDER — ACETAMINOPHEN AND CODEINE PHOSPHATE 300; 30 MG/1; MG/1
1 TABLET ORAL
Qty: 30 TABLET | Refills: 0 | Status: SHIPPED | OUTPATIENT
Start: 2019-12-11 | End: 2019-01-01

## 2020-01-01 ENCOUNTER — NURSE ONLY (OUTPATIENT)
Dept: FAMILY MEDICINE CLINIC | Age: 85
End: 2020-01-01
Payer: COMMERCIAL

## 2020-01-01 ENCOUNTER — APPOINTMENT (OUTPATIENT)
Dept: CT IMAGING | Age: 85
DRG: 197 | End: 2020-01-01
Payer: COMMERCIAL

## 2020-01-01 ENCOUNTER — HOSPITAL ENCOUNTER (EMERGENCY)
Age: 85
Discharge: HOME | End: 2020-10-07
Attending: EMERGENCY MEDICINE
Payer: COMMERCIAL

## 2020-01-01 ENCOUNTER — CARE COORDINATION (OUTPATIENT)
Dept: CASE MANAGEMENT | Age: 85
End: 2020-01-01

## 2020-01-01 ENCOUNTER — TELEPHONE (OUTPATIENT)
Dept: FAMILY MEDICINE CLINIC | Age: 85
End: 2020-01-01

## 2020-01-01 ENCOUNTER — HOSPITAL ENCOUNTER (EMERGENCY)
Age: 85
Discharge: HOME | End: 2020-07-20
Attending: EMERGENCY MEDICINE
Payer: COMMERCIAL

## 2020-01-01 ENCOUNTER — APPOINTMENT (OUTPATIENT)
Dept: ULTRASOUND IMAGING | Age: 85
End: 2020-01-01
Payer: COMMERCIAL

## 2020-01-01 ENCOUNTER — TELEPHONE (OUTPATIENT)
Dept: PALLATIVE CARE | Age: 85
End: 2020-01-01

## 2020-01-01 ENCOUNTER — HOSPITAL ENCOUNTER (OUTPATIENT)
Age: 85
Discharge: HOME | End: 2020-03-05
Payer: COMMERCIAL

## 2020-01-01 ENCOUNTER — APPOINTMENT (OUTPATIENT)
Dept: GENERAL RADIOLOGY | Age: 85
DRG: 197 | End: 2020-01-01
Payer: COMMERCIAL

## 2020-01-01 ENCOUNTER — APPOINTMENT (OUTPATIENT)
Dept: CT IMAGING | Age: 85
End: 2020-01-01
Payer: COMMERCIAL

## 2020-01-01 ENCOUNTER — OFFICE VISIT (OUTPATIENT)
Dept: FAMILY MEDICINE CLINIC | Age: 85
End: 2020-01-01
Payer: COMMERCIAL

## 2020-01-01 ENCOUNTER — VIRTUAL VISIT (OUTPATIENT)
Dept: PALLATIVE CARE | Age: 85
End: 2020-01-01
Payer: COMMERCIAL

## 2020-01-01 ENCOUNTER — OFFICE VISIT (OUTPATIENT)
Dept: PALLATIVE CARE | Age: 85
End: 2020-01-01
Payer: COMMERCIAL

## 2020-01-01 ENCOUNTER — APPOINTMENT (OUTPATIENT)
Dept: GENERAL RADIOLOGY | Age: 85
End: 2020-01-01
Payer: COMMERCIAL

## 2020-01-01 ENCOUNTER — APPOINTMENT (OUTPATIENT)
Dept: GENERAL RADIOLOGY | Age: 85
DRG: 811 | End: 2020-01-01
Payer: COMMERCIAL

## 2020-01-01 ENCOUNTER — HOSPITAL ENCOUNTER (INPATIENT)
Age: 85
LOS: 3 days | Discharge: HOME | DRG: 811 | End: 2020-02-05
Attending: EMERGENCY MEDICINE | Admitting: INTERNAL MEDICINE
Payer: COMMERCIAL

## 2020-01-01 ENCOUNTER — APPOINTMENT (OUTPATIENT)
Dept: CT IMAGING | Age: 85
DRG: 811 | End: 2020-01-01
Payer: COMMERCIAL

## 2020-01-01 ENCOUNTER — HOSPITAL ENCOUNTER (INPATIENT)
Age: 85
LOS: 6 days | Discharge: NURSING, REHAB OR LONG-TERM CARE | DRG: 197 | End: 2020-10-19
Attending: EMERGENCY MEDICINE | Admitting: INTERNAL MEDICINE
Payer: COMMERCIAL

## 2020-01-01 ENCOUNTER — CLINICAL DOCUMENTATION (OUTPATIENT)
Dept: PALLATIVE CARE | Age: 85
End: 2020-01-01

## 2020-01-01 ENCOUNTER — HOSPITAL ENCOUNTER (OUTPATIENT)
Age: 85
Setting detail: OBSERVATION
Discharge: OTHER INSTITUTION | End: 2020-11-19
Attending: EMERGENCY MEDICINE | Admitting: FAMILY MEDICINE
Payer: COMMERCIAL

## 2020-01-01 ENCOUNTER — HOSPITAL ENCOUNTER (OUTPATIENT)
Age: 85
Discharge: HOME | End: 2020-01-10
Payer: COMMERCIAL

## 2020-01-01 VITALS
BODY MASS INDEX: 22.96 KG/M2 | OXYGEN SATURATION: 95 % | HEIGHT: 55 IN | DIASTOLIC BLOOD PRESSURE: 70 MMHG | TEMPERATURE: 96.7 F | SYSTOLIC BLOOD PRESSURE: 130 MMHG | RESPIRATION RATE: 18 BRPM | WEIGHT: 99.2 LBS | HEART RATE: 85 BPM

## 2020-01-01 VITALS
WEIGHT: 104 LBS | OXYGEN SATURATION: 94 % | DIASTOLIC BLOOD PRESSURE: 72 MMHG | TEMPERATURE: 98.1 F | BODY MASS INDEX: 24.07 KG/M2 | HEIGHT: 55 IN | HEART RATE: 90 BPM | SYSTOLIC BLOOD PRESSURE: 110 MMHG | RESPIRATION RATE: 26 BRPM

## 2020-01-01 VITALS
RESPIRATION RATE: 18 BRPM | BODY MASS INDEX: 24.07 KG/M2 | SYSTOLIC BLOOD PRESSURE: 114 MMHG | TEMPERATURE: 97 F | WEIGHT: 104 LBS | HEART RATE: 84 BPM | OXYGEN SATURATION: 96 % | HEIGHT: 55 IN | DIASTOLIC BLOOD PRESSURE: 60 MMHG

## 2020-01-01 VITALS
BODY MASS INDEX: 24.07 KG/M2 | HEIGHT: 55 IN | OXYGEN SATURATION: 99 % | RESPIRATION RATE: 18 BRPM | HEART RATE: 67 BPM | SYSTOLIC BLOOD PRESSURE: 120 MMHG | DIASTOLIC BLOOD PRESSURE: 67 MMHG | TEMPERATURE: 98.2 F | WEIGHT: 104 LBS

## 2020-01-01 VITALS
HEIGHT: 55 IN | BODY MASS INDEX: 26.06 KG/M2 | DIASTOLIC BLOOD PRESSURE: 70 MMHG | SYSTOLIC BLOOD PRESSURE: 120 MMHG | TEMPERATURE: 98.2 F | OXYGEN SATURATION: 98 % | HEART RATE: 85 BPM | WEIGHT: 112.6 LBS | RESPIRATION RATE: 16 BRPM

## 2020-01-01 VITALS
BODY MASS INDEX: 25.16 KG/M2 | WEIGHT: 108.7 LBS | HEART RATE: 87 BPM | HEIGHT: 55 IN | TEMPERATURE: 96.6 F | DIASTOLIC BLOOD PRESSURE: 84 MMHG | OXYGEN SATURATION: 98 % | SYSTOLIC BLOOD PRESSURE: 145 MMHG | RESPIRATION RATE: 18 BRPM

## 2020-01-01 VITALS
DIASTOLIC BLOOD PRESSURE: 67 MMHG | RESPIRATION RATE: 24 BRPM | SYSTOLIC BLOOD PRESSURE: 117 MMHG | TEMPERATURE: 98.2 F | HEART RATE: 82 BPM | OXYGEN SATURATION: 97 %

## 2020-01-01 VITALS
DIASTOLIC BLOOD PRESSURE: 82 MMHG | BODY MASS INDEX: 26.26 KG/M2 | HEART RATE: 90 BPM | WEIGHT: 104.9 LBS | OXYGEN SATURATION: 94 % | SYSTOLIC BLOOD PRESSURE: 142 MMHG

## 2020-01-01 VITALS
HEIGHT: 55 IN | WEIGHT: 99.8 LBS | HEART RATE: 92 BPM | BODY MASS INDEX: 23.1 KG/M2 | SYSTOLIC BLOOD PRESSURE: 138 MMHG | DIASTOLIC BLOOD PRESSURE: 81 MMHG | RESPIRATION RATE: 20 BRPM | OXYGEN SATURATION: 95 % | TEMPERATURE: 97.3 F

## 2020-01-01 VITALS
OXYGEN SATURATION: 98 % | WEIGHT: 105 LBS | SYSTOLIC BLOOD PRESSURE: 157 MMHG | DIASTOLIC BLOOD PRESSURE: 85 MMHG | HEART RATE: 84 BPM | BODY MASS INDEX: 26.28 KG/M2

## 2020-01-01 VITALS
OXYGEN SATURATION: 93 % | HEART RATE: 91 BPM | DIASTOLIC BLOOD PRESSURE: 75 MMHG | BODY MASS INDEX: 27.28 KG/M2 | WEIGHT: 109 LBS | SYSTOLIC BLOOD PRESSURE: 135 MMHG

## 2020-01-01 VITALS
DIASTOLIC BLOOD PRESSURE: 88 MMHG | OXYGEN SATURATION: 100 % | TEMPERATURE: 97 F | SYSTOLIC BLOOD PRESSURE: 148 MMHG | RESPIRATION RATE: 27 BRPM | HEART RATE: 80 BPM

## 2020-01-01 LAB
6AM URINE: <10 NG/ML
ABO/RH: NORMAL
ACINETOBACTER BAUMANNII BY PCR: NOT DETECTED
ACINETOBACTER BAUMANNII BY PCR: NOT DETECTED
ADENOVIRUS BY PCR: NOT DETECTED
ALBUMIN SERPL-MCNC: 3 G/DL (ref 3.5–5.2)
ALBUMIN SERPL-MCNC: 3.1 G/DL (ref 3.5–5.2)
ALBUMIN SERPL-MCNC: 3.2 G/DL (ref 3.5–5.2)
ALBUMIN SERPL-MCNC: 3.2 G/DL (ref 3.5–5.2)
ALBUMIN SERPL-MCNC: 3.3 G/DL (ref 3.5–5.2)
ALBUMIN SERPL-MCNC: 3.4 G/DL (ref 3.5–5.2)
ALBUMIN SERPL-MCNC: 3.5 G/DL (ref 3.5–5.2)
ALBUMIN SERPL-MCNC: 3.6 G/DL (ref 3.5–5.2)
ALBUMIN SERPL-MCNC: 4.3 G/DL (ref 3.5–5.2)
ALP BLD-CCNC: 51 U/L (ref 35–104)
ALP BLD-CCNC: 65 U/L (ref 35–104)
ALP BLD-CCNC: 67 U/L (ref 35–104)
ALP BLD-CCNC: 69 U/L (ref 35–104)
ALP BLD-CCNC: 70 U/L (ref 35–104)
ALP BLD-CCNC: 72 U/L (ref 35–104)
ALP BLD-CCNC: 74 U/L (ref 35–104)
ALP BLD-CCNC: 77 U/L (ref 35–104)
ALP BLD-CCNC: 84 U/L (ref 35–104)
ALP BLD-CCNC: 93 U/L (ref 35–104)
ALT SERPL-CCNC: 10 U/L (ref 0–32)
ALT SERPL-CCNC: 15 U/L (ref 0–32)
ALT SERPL-CCNC: 5 U/L (ref 0–32)
ALT SERPL-CCNC: 6 U/L (ref 0–32)
ALT SERPL-CCNC: 7 U/L (ref 0–32)
ALT SERPL-CCNC: 7 U/L (ref 0–32)
ALT SERPL-CCNC: 8 U/L (ref 0–32)
ALT SERPL-CCNC: 9 U/L (ref 0–32)
AMPHETAMINE SCREEN, URINE: NOT DETECTED
ANION GAP SERPL CALCULATED.3IONS-SCNC: 11 MMOL/L (ref 7–16)
ANION GAP SERPL CALCULATED.3IONS-SCNC: 12 MMOL/L (ref 7–16)
ANION GAP SERPL CALCULATED.3IONS-SCNC: 13 MMOL/L (ref 7–16)
ANION GAP SERPL CALCULATED.3IONS-SCNC: 14 MMOL/L (ref 7–16)
ANION GAP SERPL CALCULATED.3IONS-SCNC: 15 MMOL/L (ref 7–16)
ANION GAP SERPL CALCULATED.3IONS-SCNC: 18 MMOL/L (ref 7–16)
ANION GAP SERPL CALCULATED.3IONS-SCNC: 6 MMOL/L (ref 7–16)
ANTI-NUCLEAR ANTIBODY (ANA): NEGATIVE
ANTIBODY IDENTIFICATION: NORMAL
ANTIBODY SCREEN: NORMAL
AST SERPL-CCNC: 13 U/L (ref 0–31)
AST SERPL-CCNC: 14 U/L (ref 0–31)
AST SERPL-CCNC: 14 U/L (ref 0–31)
AST SERPL-CCNC: 15 U/L (ref 0–31)
AST SERPL-CCNC: 15 U/L (ref 0–31)
AST SERPL-CCNC: 16 U/L (ref 0–31)
AST SERPL-CCNC: 17 U/L (ref 0–31)
AST SERPL-CCNC: 17 U/L (ref 0–31)
AST SERPL-CCNC: 21 U/L (ref 0–31)
AST SERPL-CCNC: 23 U/L (ref 0–31)
AST SERPL-CCNC: 25 U/L (ref 0–31)
AST SERPL-CCNC: 30 U/L (ref 0–31)
BACTERIA: ABNORMAL /HPF
BARBITURATE SCREEN URINE: NOT DETECTED
BASOPHILS ABSOLUTE: 0.01 E9/L (ref 0–0.2)
BASOPHILS ABSOLUTE: 0.02 E9/L (ref 0–0.2)
BASOPHILS ABSOLUTE: 0.03 E9/L (ref 0–0.2)
BASOPHILS ABSOLUTE: 0.03 E9/L (ref 0–0.2)
BASOPHILS RELATIVE PERCENT: 0.1 % (ref 0–2)
BASOPHILS RELATIVE PERCENT: 0.2 % (ref 0–2)
BASOPHILS RELATIVE PERCENT: 0.3 % (ref 0–2)
BENZODIAZEPINE SCREEN, URINE: NOT DETECTED
BILIRUB SERPL-MCNC: 0.2 MG/DL (ref 0–1.2)
BILIRUB SERPL-MCNC: <0.2 MG/DL (ref 0–1.2)
BILIRUBIN DIRECT: <0.2 MG/DL (ref 0–0.3)
BILIRUBIN URINE: NEGATIVE
BILIRUBIN, INDIRECT: NORMAL MG/DL (ref 0–1)
BLOOD BANK DISPENSE STATUS: NORMAL
BLOOD BANK PRODUCT CODE: NORMAL
BLOOD CULTURE, ROUTINE: NORMAL
BLOOD, URINE: ABNORMAL
BLOOD, URINE: NEGATIVE
BORDETELLA PARAPERTUSSIS BY PCR: NOT DETECTED
BORDETELLA PERTUSSIS BY PCR: NOT DETECTED
BOTTLE TYPE: ABNORMAL
BOTTLE TYPE: NORMAL
BPU ID: NORMAL
BUN BLDV-MCNC: 22 MG/DL (ref 8–23)
BUN BLDV-MCNC: 23 MG/DL (ref 8–23)
BUN BLDV-MCNC: 25 MG/DL (ref 8–23)
BUN BLDV-MCNC: 27 MG/DL (ref 8–23)
BUN BLDV-MCNC: 28 MG/DL (ref 8–23)
BUN BLDV-MCNC: 30 MG/DL (ref 8–23)
BUN BLDV-MCNC: 31 MG/DL (ref 8–23)
BUN BLDV-MCNC: 32 MG/DL (ref 8–23)
BUN BLDV-MCNC: 32 MG/DL (ref 8–23)
BUN BLDV-MCNC: 33 MG/DL (ref 8–23)
BUN BLDV-MCNC: 33 MG/DL (ref 8–23)
BUN BLDV-MCNC: 37 MG/DL (ref 8–23)
BUN BLDV-MCNC: 43 MG/DL (ref 8–23)
C-REACTIVE PROTEIN: 3 MG/DL (ref 0–0.4)
CALCIUM SERPL-MCNC: 10.2 MG/DL (ref 8.6–10.2)
CALCIUM SERPL-MCNC: 10.6 MG/DL (ref 8.6–10.2)
CALCIUM SERPL-MCNC: 8.7 MG/DL (ref 8.6–10.2)
CALCIUM SERPL-MCNC: 8.9 MG/DL (ref 8.6–10.2)
CALCIUM SERPL-MCNC: 9.2 MG/DL (ref 8.6–10.2)
CALCIUM SERPL-MCNC: 9.2 MG/DL (ref 8.6–10.2)
CALCIUM SERPL-MCNC: 9.3 MG/DL (ref 8.6–10.2)
CALCIUM SERPL-MCNC: 9.3 MG/DL (ref 8.6–10.2)
CALCIUM SERPL-MCNC: 9.4 MG/DL (ref 8.6–10.2)
CALCIUM SERPL-MCNC: 9.6 MG/DL (ref 8.6–10.2)
CALCIUM SERPL-MCNC: 9.6 MG/DL (ref 8.6–10.2)
CALCIUM SERPL-MCNC: 9.8 MG/DL (ref 8.6–10.2)
CALCIUM SERPL-MCNC: 9.9 MG/DL (ref 8.6–10.2)
CANDIDA ALBICANS BY PCR: NOT DETECTED
CANDIDA ALBICANS BY PCR: NOT DETECTED
CANDIDA GLABRATA BY PCR: NOT DETECTED
CANDIDA GLABRATA BY PCR: NOT DETECTED
CANDIDA KRUSEI BY PCR: NOT DETECTED
CANDIDA KRUSEI BY PCR: NOT DETECTED
CANDIDA PARAPSILOSIS BY PCR: NOT DETECTED
CANDIDA PARAPSILOSIS BY PCR: NOT DETECTED
CANDIDA TROPICALIS BY PCR: NOT DETECTED
CANDIDA TROPICALIS BY PCR: NOT DETECTED
CANNABINOID SCREEN URINE: NOT DETECTED
CHLAMYDOPHILIA PNEUMONIAE BY PCR: NOT DETECTED
CHLORIDE BLD-SCNC: 102 MMOL/L (ref 98–107)
CHLORIDE BLD-SCNC: 102 MMOL/L (ref 98–107)
CHLORIDE BLD-SCNC: 103 MMOL/L (ref 98–107)
CHLORIDE BLD-SCNC: 104 MMOL/L (ref 98–107)
CHLORIDE BLD-SCNC: 104 MMOL/L (ref 98–107)
CHLORIDE BLD-SCNC: 106 MMOL/L (ref 98–107)
CHLORIDE BLD-SCNC: 110 MMOL/L (ref 98–107)
CHLORIDE BLD-SCNC: 98 MMOL/L (ref 98–107)
CHLORIDE BLD-SCNC: 99 MMOL/L (ref 98–107)
CLARITY: ABNORMAL
CLARITY: CLEAR
CO2: 17 MMOL/L (ref 22–29)
CO2: 19 MMOL/L (ref 22–29)
CO2: 20 MMOL/L (ref 22–29)
CO2: 20 MMOL/L (ref 22–29)
CO2: 21 MMOL/L (ref 22–29)
CO2: 22 MMOL/L (ref 22–29)
CO2: 23 MMOL/L (ref 22–29)
CO2: 23 MMOL/L (ref 22–29)
CO2: 24 MMOL/L (ref 22–29)
CO2: 24 MMOL/L (ref 22–29)
CO2: 25 MMOL/L (ref 22–29)
CO2: 25 MMOL/L (ref 22–29)
CO2: 26 MMOL/L (ref 22–29)
COCAINE METABOLITE SCREEN URINE: NOT DETECTED
CODEINE, URINE: <20 NG/ML
COLOR: YELLOW
CORONAVIRUS 229E BY PCR: NOT DETECTED
CORONAVIRUS HKU1 BY PCR: NOT DETECTED
CORONAVIRUS NL63 BY PCR: NOT DETECTED
CORONAVIRUS OC43 BY PCR: NOT DETECTED
CREAT SERPL-MCNC: 1.1 MG/DL (ref 0.5–1)
CREAT SERPL-MCNC: 1.2 MG/DL (ref 0.5–1)
CREAT SERPL-MCNC: 1.3 MG/DL (ref 0.5–1)
CREAT SERPL-MCNC: 1.5 MG/DL (ref 0.5–1)
CREAT SERPL-MCNC: 1.5 MG/DL (ref 0.5–1)
CREAT SERPL-MCNC: 1.6 MG/DL (ref 0.5–1)
CREAT SERPL-MCNC: 1.7 MG/DL (ref 0.5–1)
CREAT SERPL-MCNC: 1.7 MG/DL (ref 0.5–1)
CULTURE, BLOOD 2: ABNORMAL
CULTURE, BLOOD 2: NORMAL
DAT POLYSPECIFIC: NORMAL
DESCRIPTION BLOOD BANK: NORMAL
DR. NOTIFY: NORMAL
EKG ATRIAL RATE: 81 BPM
EKG ATRIAL RATE: 85 BPM
EKG ATRIAL RATE: 88 BPM
EKG ATRIAL RATE: 94 BPM
EKG P AXIS: 27 DEGREES
EKG P AXIS: 36 DEGREES
EKG P AXIS: 38 DEGREES
EKG P AXIS: 38 DEGREES
EKG P-R INTERVAL: 176 MS
EKG P-R INTERVAL: 178 MS
EKG Q-T INTERVAL: 374 MS
EKG Q-T INTERVAL: 384 MS
EKG Q-T INTERVAL: 398 MS
EKG Q-T INTERVAL: 398 MS
EKG QRS DURATION: 114 MS
EKG QRS DURATION: 116 MS
EKG QRS DURATION: 120 MS
EKG QRS DURATION: 126 MS
EKG QTC CALCULATION (BAZETT): 452 MS
EKG QTC CALCULATION (BAZETT): 462 MS
EKG QTC CALCULATION (BAZETT): 473 MS
EKG QTC CALCULATION (BAZETT): 480 MS
EKG R AXIS: 22 DEGREES
EKG R AXIS: 22 DEGREES
EKG R AXIS: 6 DEGREES
EKG R AXIS: 62 DEGREES
EKG T AXIS: 10 DEGREES
EKG T AXIS: 14 DEGREES
EKG T AXIS: 3 DEGREES
EKG T AXIS: 30 DEGREES
EKG VENTRICULAR RATE: 81 BPM
EKG VENTRICULAR RATE: 85 BPM
EKG VENTRICULAR RATE: 88 BPM
EKG VENTRICULAR RATE: 94 BPM
ENA TO SSA (RO) ANTIBODY: NEGATIVE
ENA TO SSB (LA) ANTIBODY: NEGATIVE
ENTEROBACTER CLOACAE COMPLEX BY PCR: NOT DETECTED
ENTEROBACTER CLOACAE COMPLEX BY PCR: NOT DETECTED
ENTEROBACTERALES BY PCR: NOT DETECTED
ENTEROBACTERALES BY PCR: NOT DETECTED
ENTEROCOCCUS BY PCR: NOT DETECTED
ENTEROCOCCUS BY PCR: NOT DETECTED
EOSINOPHILS ABSOLUTE: 0 E9/L (ref 0.05–0.5)
EOSINOPHILS ABSOLUTE: 0.06 E9/L (ref 0.05–0.5)
EOSINOPHILS ABSOLUTE: 0.14 E9/L (ref 0.05–0.5)
EOSINOPHILS ABSOLUTE: 0.16 E9/L (ref 0.05–0.5)
EOSINOPHILS ABSOLUTE: 0.21 E9/L (ref 0.05–0.5)
EOSINOPHILS ABSOLUTE: 0.26 E9/L (ref 0.05–0.5)
EOSINOPHILS RELATIVE PERCENT: 0 % (ref 0–6)
EOSINOPHILS RELATIVE PERCENT: 0.8 % (ref 0–6)
EOSINOPHILS RELATIVE PERCENT: 1.7 % (ref 0–6)
EOSINOPHILS RELATIVE PERCENT: 1.8 % (ref 0–6)
EOSINOPHILS RELATIVE PERCENT: 2.5 % (ref 0–6)
EOSINOPHILS RELATIVE PERCENT: 2.6 % (ref 0–6)
ESCHERICHIA COLI BY PCR: NOT DETECTED
ESCHERICHIA COLI BY PCR: NOT DETECTED
FENTANYL SCREEN, URINE: NOT DETECTED
FENTANYL URINE: <1 NG/ML
GFR AFRICAN AMERICAN: 34
GFR AFRICAN AMERICAN: 34
GFR AFRICAN AMERICAN: 37
GFR AFRICAN AMERICAN: 39
GFR AFRICAN AMERICAN: 39
GFR AFRICAN AMERICAN: 47
GFR AFRICAN AMERICAN: 51
GFR AFRICAN AMERICAN: 56
GFR NON-AFRICAN AMERICAN: 28 ML/MIN/1.73
GFR NON-AFRICAN AMERICAN: 28 ML/MIN/1.73
GFR NON-AFRICAN AMERICAN: 30 ML/MIN/1.73
GFR NON-AFRICAN AMERICAN: 33 ML/MIN/1.73
GFR NON-AFRICAN AMERICAN: 33 ML/MIN/1.73
GFR NON-AFRICAN AMERICAN: 38 ML/MIN/1.73
GFR NON-AFRICAN AMERICAN: 39 ML/MIN/1.73
GFR NON-AFRICAN AMERICAN: 39 ML/MIN/1.73
GFR NON-AFRICAN AMERICAN: 42 ML/MIN/1.73
GFR NON-AFRICAN AMERICAN: 47 ML/MIN/1.73
GLUCOSE BLD-MCNC: 121 MG/DL (ref 74–99)
GLUCOSE BLD-MCNC: 123 MG/DL (ref 74–99)
GLUCOSE BLD-MCNC: 124 MG/DL (ref 74–99)
GLUCOSE BLD-MCNC: 129 MG/DL (ref 74–99)
GLUCOSE BLD-MCNC: 129 MG/DL (ref 74–99)
GLUCOSE BLD-MCNC: 143 MG/DL (ref 74–99)
GLUCOSE BLD-MCNC: 144 MG/DL (ref 74–99)
GLUCOSE BLD-MCNC: 175 MG/DL (ref 74–99)
GLUCOSE BLD-MCNC: 177 MG/DL (ref 74–99)
GLUCOSE BLD-MCNC: 84 MG/DL (ref 74–99)
GLUCOSE BLD-MCNC: 94 MG/DL (ref 74–99)
GLUCOSE BLD-MCNC: 95 MG/DL (ref 74–99)
GLUCOSE BLD-MCNC: 96 MG/DL (ref 74–99)
GLUCOSE URINE: NEGATIVE MG/DL
HAEMOPHILUS INFLUENZAE BY PCR: NOT DETECTED
HAEMOPHILUS INFLUENZAE BY PCR: NOT DETECTED
HCT VFR BLD CALC: 20.2 % (ref 34–48)
HCT VFR BLD CALC: 20.5 % (ref 34–48)
HCT VFR BLD CALC: 28.1 % (ref 34–48)
HCT VFR BLD CALC: 28.2 % (ref 34–48)
HCT VFR BLD CALC: 28.4 % (ref 34–48)
HCT VFR BLD CALC: 28.5 % (ref 34–48)
HCT VFR BLD CALC: 29.1 % (ref 34–48)
HCT VFR BLD CALC: 29.3 % (ref 34–48)
HCT VFR BLD CALC: 29.5 % (ref 34–48)
HCT VFR BLD CALC: 29.5 % (ref 34–48)
HCT VFR BLD CALC: 29.7 % (ref 34–48)
HCT VFR BLD CALC: 29.8 % (ref 34–48)
HCT VFR BLD CALC: 29.8 % (ref 34–48)
HCT VFR BLD CALC: 31 % (ref 34–48)
HCT VFR BLD CALC: 31.6 % (ref 34–48)
HCT VFR BLD CALC: 33.1 % (ref 34–48)
HCT VFR BLD CALC: 34.2 % (ref 34–48)
HCT VFR BLD CALC: 36.2 % (ref 34–48)
HEMOGLOBIN: 10.2 G/DL (ref 11.5–15.5)
HEMOGLOBIN: 10.5 G/DL (ref 11.5–15.5)
HEMOGLOBIN: 11.2 G/DL (ref 11.5–15.5)
HEMOGLOBIN: 6 G/DL (ref 11.5–15.5)
HEMOGLOBIN: 6.1 G/DL (ref 11.5–15.5)
HEMOGLOBIN: 8.5 G/DL (ref 11.5–15.5)
HEMOGLOBIN: 8.6 G/DL (ref 11.5–15.5)
HEMOGLOBIN: 8.7 G/DL (ref 11.5–15.5)
HEMOGLOBIN: 8.9 G/DL (ref 11.5–15.5)
HEMOGLOBIN: 9 G/DL (ref 11.5–15.5)
HEMOGLOBIN: 9 G/DL (ref 11.5–15.5)
HEMOGLOBIN: 9.1 G/DL (ref 11.5–15.5)
HEMOGLOBIN: 9.1 G/DL (ref 11.5–15.5)
HEMOGLOBIN: 9.2 G/DL (ref 11.5–15.5)
HEMOGLOBIN: 9.3 G/DL (ref 11.5–15.5)
HEMOGLOBIN: 9.4 G/DL (ref 11.5–15.5)
HEMOGLOBIN: 9.4 G/DL (ref 11.5–15.5)
HEMOGLOBIN: 9.8 G/DL (ref 11.5–15.5)
HUMAN METAPNEUMOVIRUS BY PCR: NOT DETECTED
HUMAN RHINOVIRUS/ENTEROVIRUS BY PCR: NOT DETECTED
HYDROCODONE, URINE: 44 NG/ML
HYDROMORPHONE, URINE: <20 NG/ML
IMMATURE GRANULOCYTES #: 0.02 E9/L
IMMATURE GRANULOCYTES #: 0.04 E9/L
IMMATURE GRANULOCYTES #: 0.05 E9/L
IMMATURE GRANULOCYTES #: 0.06 E9/L
IMMATURE GRANULOCYTES #: 0.06 E9/L
IMMATURE GRANULOCYTES #: 0.08 E9/L
IMMATURE GRANULOCYTES #: 0.09 E9/L
IMMATURE GRANULOCYTES #: 0.09 E9/L
IMMATURE GRANULOCYTES #: 0.15 E9/L
IMMATURE GRANULOCYTES #: 0.15 E9/L
IMMATURE GRANULOCYTES %: 0.2 % (ref 0–5)
IMMATURE GRANULOCYTES %: 0.5 % (ref 0–5)
IMMATURE GRANULOCYTES %: 0.6 % (ref 0–5)
IMMATURE GRANULOCYTES %: 0.6 % (ref 0–5)
IMMATURE GRANULOCYTES %: 0.7 % (ref 0–5)
IMMATURE GRANULOCYTES %: 0.8 % (ref 0–5)
IMMATURE GRANULOCYTES %: 1.2 % (ref 0–5)
IMMATURE GRANULOCYTES %: 1.2 % (ref 0–5)
INFLUENZA A BY PCR: NOT DETECTED
INFLUENZA B BY PCR: NOT DETECTED
KETONES, URINE: NEGATIVE MG/DL
KLEBSIELLA OXYTOCA BY PCR: NOT DETECTED
KLEBSIELLA OXYTOCA BY PCR: NOT DETECTED
KLEBSIELLA PNEUMONIAE GROUP BY PCR: NOT DETECTED
KLEBSIELLA PNEUMONIAE GROUP BY PCR: NOT DETECTED
L. PNEUMOPHILA SEROGP 1 UR AG: NORMAL
LACTIC ACID, SEPSIS: 1.5 MMOL/L (ref 0.5–1.9)
LACTIC ACID, SEPSIS: 2.1 MMOL/L (ref 0.5–1.9)
LACTIC ACID, SEPSIS: 2.3 MMOL/L (ref 0.5–1.9)
LACTIC ACID, SEPSIS: 3.7 MMOL/L (ref 0.5–1.9)
LACTIC ACID: 1.7 MMOL/L (ref 0.5–2.2)
LACTIC ACID: 1.7 MMOL/L (ref 0.5–2.2)
LEUKOCYTE ESTERASE, URINE: ABNORMAL
LEUKOCYTE ESTERASE, URINE: NEGATIVE
LIPASE: 11 U/L (ref 13–60)
LIPASE: 22 U/L (ref 13–60)
LIPASE: 9 U/L (ref 13–60)
LISTERIA MONOCYTOGENES BY PCR: NOT DETECTED
LISTERIA MONOCYTOGENES BY PCR: NOT DETECTED
LYMPHOCYTES ABSOLUTE: 1.02 E9/L (ref 1.5–4)
LYMPHOCYTES ABSOLUTE: 1.35 E9/L (ref 1.5–4)
LYMPHOCYTES ABSOLUTE: 1.46 E9/L (ref 1.5–4)
LYMPHOCYTES ABSOLUTE: 1.63 E9/L (ref 1.5–4)
LYMPHOCYTES ABSOLUTE: 2 E9/L (ref 1.5–4)
LYMPHOCYTES ABSOLUTE: 2.16 E9/L (ref 1.5–4)
LYMPHOCYTES ABSOLUTE: 2.25 E9/L (ref 1.5–4)
LYMPHOCYTES ABSOLUTE: 2.51 E9/L (ref 1.5–4)
LYMPHOCYTES ABSOLUTE: 2.82 E9/L (ref 1.5–4)
LYMPHOCYTES ABSOLUTE: 3.05 E9/L (ref 1.5–4)
LYMPHOCYTES RELATIVE PERCENT: 11.6 % (ref 20–42)
LYMPHOCYTES RELATIVE PERCENT: 11.9 % (ref 20–42)
LYMPHOCYTES RELATIVE PERCENT: 15 % (ref 20–42)
LYMPHOCYTES RELATIVE PERCENT: 16.5 % (ref 20–42)
LYMPHOCYTES RELATIVE PERCENT: 18.2 % (ref 20–42)
LYMPHOCYTES RELATIVE PERCENT: 30.6 % (ref 20–42)
LYMPHOCYTES RELATIVE PERCENT: 32.9 % (ref 20–42)
LYMPHOCYTES RELATIVE PERCENT: 33.5 % (ref 20–42)
LYMPHOCYTES RELATIVE PERCENT: 34.4 % (ref 20–42)
LYMPHOCYTES RELATIVE PERCENT: 8.8 % (ref 20–42)
Lab: NORMAL
MCH RBC QN AUTO: 25.4 PG (ref 26–35)
MCH RBC QN AUTO: 25.6 PG (ref 26–35)
MCH RBC QN AUTO: 25.9 PG (ref 26–35)
MCH RBC QN AUTO: 26.1 PG (ref 26–35)
MCH RBC QN AUTO: 26.3 PG (ref 26–35)
MCH RBC QN AUTO: 26.4 PG (ref 26–35)
MCH RBC QN AUTO: 26.4 PG (ref 26–35)
MCH RBC QN AUTO: 26.8 PG (ref 26–35)
MCH RBC QN AUTO: 26.9 PG (ref 26–35)
MCH RBC QN AUTO: 27.4 PG (ref 26–35)
MCH RBC QN AUTO: 27.5 PG (ref 26–35)
MCH RBC QN AUTO: 27.8 PG (ref 26–35)
MCH RBC QN AUTO: 27.8 PG (ref 26–35)
MCH RBC QN AUTO: 28.1 PG (ref 26–35)
MCHC RBC AUTO-ENTMCNC: 29.7 % (ref 32–34.5)
MCHC RBC AUTO-ENTMCNC: 29.8 % (ref 32–34.5)
MCHC RBC AUTO-ENTMCNC: 30.3 % (ref 32–34.5)
MCHC RBC AUTO-ENTMCNC: 30.5 % (ref 32–34.5)
MCHC RBC AUTO-ENTMCNC: 30.6 % (ref 32–34.5)
MCHC RBC AUTO-ENTMCNC: 30.6 % (ref 32–34.5)
MCHC RBC AUTO-ENTMCNC: 30.7 % (ref 32–34.5)
MCHC RBC AUTO-ENTMCNC: 30.8 % (ref 32–34.5)
MCHC RBC AUTO-ENTMCNC: 30.9 % (ref 32–34.5)
MCHC RBC AUTO-ENTMCNC: 30.9 % (ref 32–34.5)
MCHC RBC AUTO-ENTMCNC: 31 % (ref 32–34.5)
MCHC RBC AUTO-ENTMCNC: 31 % (ref 32–34.5)
MCHC RBC AUTO-ENTMCNC: 31.1 % (ref 32–34.5)
MCHC RBC AUTO-ENTMCNC: 31.2 % (ref 32–34.5)
MCHC RBC AUTO-ENTMCNC: 31.6 % (ref 32–34.5)
MCHC RBC AUTO-ENTMCNC: 31.9 % (ref 32–34.5)
MCV RBC AUTO: 83.2 FL (ref 80–99.9)
MCV RBC AUTO: 83.7 FL (ref 80–99.9)
MCV RBC AUTO: 83.9 FL (ref 80–99.9)
MCV RBC AUTO: 84.5 FL (ref 80–99.9)
MCV RBC AUTO: 84.5 FL (ref 80–99.9)
MCV RBC AUTO: 84.7 FL (ref 80–99.9)
MCV RBC AUTO: 85.2 FL (ref 80–99.9)
MCV RBC AUTO: 85.3 FL (ref 80–99.9)
MCV RBC AUTO: 85.3 FL (ref 80–99.9)
MCV RBC AUTO: 88.1 FL (ref 80–99.9)
MCV RBC AUTO: 88.3 FL (ref 80–99.9)
MCV RBC AUTO: 88.9 FL (ref 80–99.9)
MCV RBC AUTO: 89 FL (ref 80–99.9)
MCV RBC AUTO: 89.3 FL (ref 80–99.9)
MCV RBC AUTO: 90.2 FL (ref 80–99.9)
MCV RBC AUTO: 90.2 FL (ref 80–99.9)
METHADONE SCREEN, URINE: NOT DETECTED
METHICILLIN RESISTANCE MECA/C  BY PCR: DETECTED
MONOCYTES ABSOLUTE: 0.4 E9/L (ref 0.1–0.95)
MONOCYTES ABSOLUTE: 0.41 E9/L (ref 0.1–0.95)
MONOCYTES ABSOLUTE: 0.49 E9/L (ref 0.1–0.95)
MONOCYTES ABSOLUTE: 0.6 E9/L (ref 0.1–0.95)
MONOCYTES ABSOLUTE: 0.62 E9/L (ref 0.1–0.95)
MONOCYTES ABSOLUTE: 0.79 E9/L (ref 0.1–0.95)
MONOCYTES ABSOLUTE: 0.81 E9/L (ref 0.1–0.95)
MONOCYTES ABSOLUTE: 0.82 E9/L (ref 0.1–0.95)
MONOCYTES ABSOLUTE: 0.88 E9/L (ref 0.1–0.95)
MONOCYTES ABSOLUTE: 0.89 E9/L (ref 0.1–0.95)
MONOCYTES RELATIVE PERCENT: 10.6 % (ref 2–12)
MONOCYTES RELATIVE PERCENT: 10.9 % (ref 2–12)
MONOCYTES RELATIVE PERCENT: 12.7 % (ref 2–12)
MONOCYTES RELATIVE PERCENT: 3.4 % (ref 2–12)
MONOCYTES RELATIVE PERCENT: 3.8 % (ref 2–12)
MONOCYTES RELATIVE PERCENT: 4 % (ref 2–12)
MONOCYTES RELATIVE PERCENT: 4.8 % (ref 2–12)
MONOCYTES RELATIVE PERCENT: 5.4 % (ref 2–12)
MONOCYTES RELATIVE PERCENT: 6.5 % (ref 2–12)
MONOCYTES RELATIVE PERCENT: 8.8 % (ref 2–12)
MORPHINE URINE: <20 NG/ML
MYCOPLASMA PNEUMONIAE BY PCR: NOT DETECTED
NEISSERIA MENINGITIDIS BY PCR: NOT DETECTED
NEISSERIA MENINGITIDIS BY PCR: NOT DETECTED
NEUTROPHILS ABSOLUTE: 10.26 E9/L (ref 1.8–7.3)
NEUTROPHILS ABSOLUTE: 3.24 E9/L (ref 1.8–7.3)
NEUTROPHILS ABSOLUTE: 4.17 E9/L (ref 1.8–7.3)
NEUTROPHILS ABSOLUTE: 4.3 E9/L (ref 1.8–7.3)
NEUTROPHILS ABSOLUTE: 5.69 E9/L (ref 1.8–7.3)
NEUTROPHILS ABSOLUTE: 8.73 E9/L (ref 1.8–7.3)
NEUTROPHILS ABSOLUTE: 9.18 E9/L (ref 1.8–7.3)
NEUTROPHILS ABSOLUTE: 9.38 E9/L (ref 1.8–7.3)
NEUTROPHILS ABSOLUTE: 9.59 E9/L (ref 1.8–7.3)
NEUTROPHILS ABSOLUTE: 9.83 E9/L (ref 1.8–7.3)
NEUTROPHILS RELATIVE PERCENT: 50.4 % (ref 43–80)
NEUTROPHILS RELATIVE PERCENT: 52.6 % (ref 43–80)
NEUTROPHILS RELATIVE PERCENT: 54.7 % (ref 43–80)
NEUTROPHILS RELATIVE PERCENT: 57.1 % (ref 43–80)
NEUTROPHILS RELATIVE PERCENT: 75.7 % (ref 43–80)
NEUTROPHILS RELATIVE PERCENT: 75.7 % (ref 43–80)
NEUTROPHILS RELATIVE PERCENT: 80.4 % (ref 43–80)
NEUTROPHILS RELATIVE PERCENT: 83.2 % (ref 43–80)
NEUTROPHILS RELATIVE PERCENT: 83.3 % (ref 43–80)
NEUTROPHILS RELATIVE PERCENT: 84.1 % (ref 43–80)
NITRITE, URINE: NEGATIVE
NITRITE, URINE: POSITIVE
NORFENTANYL, URINE: <1 NG/ML
NORHYDROCODONE, URINE: 151 NG/ML
NOROXYCODONE, URINE: <20 NG/ML
NOROXYMORPHONE, URINE: <20 NG/ML
OPIATE SCREEN URINE: NOT DETECTED
ORDER NUMBER: ABNORMAL
ORDER NUMBER: NORMAL
ORGANISM: ABNORMAL
OXYCODONE URINE: NOT DETECTED
OXYCODONE, URINE CONFIRMATION: <20 NG/ML
OXYMORPHONE, URINE: <20 NG/ML
PARAINFLUENZA VIRUS 1 BY PCR: NOT DETECTED
PARAINFLUENZA VIRUS 2 BY PCR: NOT DETECTED
PARAINFLUENZA VIRUS 3 BY PCR: NOT DETECTED
PARAINFLUENZA VIRUS 4 BY PCR: NOT DETECTED
PDW BLD-RTO: 12.6 FL (ref 11.5–15)
PDW BLD-RTO: 13.6 FL (ref 11.5–15)
PDW BLD-RTO: 13.7 FL (ref 11.5–15)
PDW BLD-RTO: 13.8 FL (ref 11.5–15)
PDW BLD-RTO: 13.9 FL (ref 11.5–15)
PDW BLD-RTO: 13.9 FL (ref 11.5–15)
PDW BLD-RTO: 14 FL (ref 11.5–15)
PDW BLD-RTO: 14 FL (ref 11.5–15)
PDW BLD-RTO: 14.2 FL (ref 11.5–15)
PDW BLD-RTO: 14.3 FL (ref 11.5–15)
PDW BLD-RTO: 14.6 FL (ref 11.5–15)
PH UA: 6 (ref 5–9)
PH UA: 6.5 (ref 5–9)
PH UA: 7 (ref 5–9)
PH UA: 7.5 (ref 5–9)
PHENCYCLIDINE SCREEN URINE: NOT DETECTED
PLATELET # BLD: 207 E9/L (ref 130–450)
PLATELET # BLD: 212 E9/L (ref 130–450)
PLATELET # BLD: 233 E9/L (ref 130–450)
PLATELET # BLD: 234 E9/L (ref 130–450)
PLATELET # BLD: 235 E9/L (ref 130–450)
PLATELET # BLD: 238 E9/L (ref 130–450)
PLATELET # BLD: 268 E9/L (ref 130–450)
PLATELET # BLD: 273 E9/L (ref 130–450)
PLATELET # BLD: 283 E9/L (ref 130–450)
PLATELET # BLD: 305 E9/L (ref 130–450)
PLATELET # BLD: 309 E9/L (ref 130–450)
PLATELET # BLD: 315 E9/L (ref 130–450)
PLATELET # BLD: 327 E9/L (ref 130–450)
PLATELET # BLD: 339 E9/L (ref 130–450)
PLATELET # BLD: 361 E9/L (ref 130–450)
PLATELET # BLD: 362 E9/L (ref 130–450)
PMV BLD AUTO: 9 FL (ref 7–12)
PMV BLD AUTO: 9.1 FL (ref 7–12)
PMV BLD AUTO: 9.2 FL (ref 7–12)
PMV BLD AUTO: 9.4 FL (ref 7–12)
PMV BLD AUTO: 9.5 FL (ref 7–12)
PMV BLD AUTO: 9.6 FL (ref 7–12)
PMV BLD AUTO: 9.7 FL (ref 7–12)
POTASSIUM REFLEX MAGNESIUM: 4.5 MMOL/L (ref 3.5–5)
POTASSIUM REFLEX MAGNESIUM: 4.5 MMOL/L (ref 3.5–5)
POTASSIUM REFLEX MAGNESIUM: 4.7 MMOL/L (ref 3.5–5)
POTASSIUM REFLEX MAGNESIUM: 4.8 MMOL/L (ref 3.5–5)
POTASSIUM REFLEX MAGNESIUM: 4.9 MMOL/L (ref 3.5–5)
POTASSIUM REFLEX MAGNESIUM: 5 MMOL/L (ref 3.5–5)
POTASSIUM REFLEX MAGNESIUM: 5 MMOL/L (ref 3.5–5)
POTASSIUM REFLEX MAGNESIUM: 5.1 MMOL/L (ref 3.5–5)
POTASSIUM REFLEX MAGNESIUM: 5.2 MMOL/L (ref 3.5–5)
POTASSIUM SERPL-SCNC: 4.3 MMOL/L (ref 3.5–5)
POTASSIUM SERPL-SCNC: 5.3 MMOL/L (ref 3.5–5)
PRO-BNP: 459 PG/ML (ref 0–450)
PRO-BNP: 682 PG/ML (ref 0–450)
PROCALCITONIN: 0.12 NG/ML (ref 0–0.08)
PROCALCITONIN: 0.19 NG/ML (ref 0–0.08)
PROTEIN UA: NEGATIVE MG/DL
PROTEUS BY PCR: NOT DETECTED
PROTEUS BY PCR: NOT DETECTED
PSEUDOMONAS AERUGINOSA BY PCR: NOT DETECTED
PSEUDOMONAS AERUGINOSA BY PCR: NOT DETECTED
RBC # BLD: 2.24 E12/L (ref 3.5–5.5)
RBC # BLD: 3.3 E12/L (ref 3.5–5.5)
RBC # BLD: 3.34 E12/L (ref 3.5–5.5)
RBC # BLD: 3.34 E12/L (ref 3.5–5.5)
RBC # BLD: 3.35 E12/L (ref 3.5–5.5)
RBC # BLD: 3.35 E12/L (ref 3.5–5.5)
RBC # BLD: 3.36 E12/L (ref 3.5–5.5)
RBC # BLD: 3.37 E12/L (ref 3.5–5.5)
RBC # BLD: 3.47 E12/L (ref 3.5–5.5)
RBC # BLD: 3.48 E12/L (ref 3.5–5.5)
RBC # BLD: 3.52 E12/L (ref 3.5–5.5)
RBC # BLD: 3.67 E12/L (ref 3.5–5.5)
RBC # BLD: 3.67 E12/L (ref 3.5–5.5)
RBC # BLD: 3.73 E12/L (ref 3.5–5.5)
RBC # BLD: 3.83 E12/L (ref 3.5–5.5)
RBC # BLD: 4.07 E12/L (ref 3.5–5.5)
RBC UA: >20 /HPF (ref 0–2)
RBC UA: ABNORMAL /HPF (ref 0–2)
RBC UA: ABNORMAL /HPF (ref 0–2)
REASON FOR REJECTION: NORMAL
REJECTED TEST: NORMAL
RESPIRATORY SYNCYTIAL VIRUS BY PCR: NOT DETECTED
SARS-COV-2, NAAT: NOT DETECTED
SARS-COV-2, NAAT: NOT DETECTED
SARS-COV-2, PCR: NOT DETECTED
SCLERODERMA (SCL-70) AB: NEGATIVE
SEDIMENTATION RATE, ERYTHROCYTE: 70 MM/HR (ref 0–20)
SERRATIA MARCESCENS BY PCR: NOT DETECTED
SERRATIA MARCESCENS BY PCR: NOT DETECTED
SODIUM BLD-SCNC: 135 MMOL/L (ref 132–146)
SODIUM BLD-SCNC: 135 MMOL/L (ref 132–146)
SODIUM BLD-SCNC: 136 MMOL/L (ref 132–146)
SODIUM BLD-SCNC: 136 MMOL/L (ref 132–146)
SODIUM BLD-SCNC: 137 MMOL/L (ref 132–146)
SODIUM BLD-SCNC: 138 MMOL/L (ref 132–146)
SODIUM BLD-SCNC: 139 MMOL/L (ref 132–146)
SODIUM BLD-SCNC: 140 MMOL/L (ref 132–146)
SOURCE OF BLOOD CULTURE: ABNORMAL
SOURCE OF BLOOD CULTURE: NORMAL
SPECIFIC GRAVITY UA: 1.01 (ref 1–1.03)
SPECIFIC GRAVITY UA: 1.01 (ref 1–1.03)
SPECIFIC GRAVITY UA: 1.02 (ref 1–1.03)
SPECIFIC GRAVITY UA: <=1.005 (ref 1–1.03)
STAPHYLOCOCCUS AUREUS BY PCR: NOT DETECTED
STAPHYLOCOCCUS AUREUS BY PCR: NOT DETECTED
STAPHYLOCOCCUS SPECIES BY PCR: DETECTED
STAPHYLOCOCCUS SPECIES BY PCR: NOT DETECTED
STREP PNEUMONIAE ANTIGEN, URINE: NORMAL
STREPTOCOCCUS AGALACTIAE BY PCR: NOT DETECTED
STREPTOCOCCUS AGALACTIAE BY PCR: NOT DETECTED
STREPTOCOCCUS PNEUMONIAE BY PCR: NOT DETECTED
STREPTOCOCCUS PNEUMONIAE BY PCR: NOT DETECTED
STREPTOCOCCUS PYOGENES  BY PCR: NOT DETECTED
STREPTOCOCCUS PYOGENES  BY PCR: NOT DETECTED
STREPTOCOCCUS SPECIES BY PCR: NOT DETECTED
STREPTOCOCCUS SPECIES BY PCR: NOT DETECTED
TOTAL CK: 45 U/L (ref 20–180)
TOTAL PROTEIN: 5.5 G/DL (ref 6.4–8.3)
TOTAL PROTEIN: 5.8 G/DL (ref 6.4–8.3)
TOTAL PROTEIN: 6.4 G/DL (ref 6.4–8.3)
TOTAL PROTEIN: 6.5 G/DL (ref 6.4–8.3)
TOTAL PROTEIN: 6.7 G/DL (ref 6.4–8.3)
TOTAL PROTEIN: 6.7 G/DL (ref 6.4–8.3)
TOTAL PROTEIN: 6.8 G/DL (ref 6.4–8.3)
TOTAL PROTEIN: 6.8 G/DL (ref 6.4–8.3)
TOTAL PROTEIN: 6.9 G/DL (ref 6.4–8.3)
TOTAL PROTEIN: 7.3 G/DL (ref 6.4–8.3)
TOTAL PROTEIN: 7.3 G/DL (ref 6.4–8.3)
TOTAL PROTEIN: 7.5 G/DL (ref 6.4–8.3)
TROPONIN: <0.01 NG/ML (ref 0–0.03)
URINE CULTURE, ROUTINE: ABNORMAL
URINE CULTURE, ROUTINE: ABNORMAL
URINE CULTURE, ROUTINE: NORMAL
URINE CULTURE, ROUTINE: NORMAL
UROBILINOGEN, URINE: 0.2 E.U./DL
WBC # BLD: 10 E9/L (ref 4.5–11.5)
WBC # BLD: 10.9 E9/L (ref 4.5–11.5)
WBC # BLD: 11.5 E9/L (ref 4.5–11.5)
WBC # BLD: 11.7 E9/L (ref 4.5–11.5)
WBC # BLD: 12.1 E9/L (ref 4.5–11.5)
WBC # BLD: 12.3 E9/L (ref 4.5–11.5)
WBC # BLD: 12.4 E9/L (ref 4.5–11.5)
WBC # BLD: 6.3 E9/L (ref 4.5–11.5)
WBC # BLD: 6.3 E9/L (ref 4.5–11.5)
WBC # BLD: 6.4 E9/L (ref 4.5–11.5)
WBC # BLD: 7.6 E9/L (ref 4.5–11.5)
WBC # BLD: 7.9 E9/L (ref 4.5–11.5)
WBC # BLD: 8.2 E9/L (ref 4.5–11.5)
WBC # BLD: 8.4 E9/L (ref 4.5–11.5)
WBC # BLD: 8.5 E9/L (ref 4.5–11.5)
WBC # BLD: 9.3 E9/L (ref 4.5–11.5)
WBC UA: ABNORMAL /HPF (ref 0–5)

## 2020-01-01 PROCEDURE — 84145 PROCALCITONIN (PCT): CPT

## 2020-01-01 PROCEDURE — U0002 COVID-19 LAB TEST NON-CDC: HCPCS

## 2020-01-01 PROCEDURE — G8428 CUR MEDS NOT DOCUMENT: HCPCS | Performed by: NURSE PRACTITIONER

## 2020-01-01 PROCEDURE — 2060000000 HC ICU INTERMEDIATE R&B

## 2020-01-01 PROCEDURE — 87450 HC DIRECT STREP B ANTIGEN: CPT

## 2020-01-01 PROCEDURE — 36415 COLL VENOUS BLD VENIPUNCTURE: CPT

## 2020-01-01 PROCEDURE — 96376 TX/PRO/DX INJ SAME DRUG ADON: CPT

## 2020-01-01 PROCEDURE — 99232 SBSQ HOSP IP/OBS MODERATE 35: CPT | Performed by: SURGERY

## 2020-01-01 PROCEDURE — 87449 NOS EACH ORGANISM AG IA: CPT

## 2020-01-01 PROCEDURE — 99213 OFFICE O/P EST LOW 20 MIN: CPT | Performed by: STUDENT IN AN ORGANIZED HEALTH CARE EDUCATION/TRAINING PROGRAM

## 2020-01-01 PROCEDURE — G0480 DRUG TEST DEF 1-7 CLASSES: HCPCS

## 2020-01-01 PROCEDURE — 82550 ASSAY OF CK (CPK): CPT

## 2020-01-01 PROCEDURE — 1036F TOBACCO NON-USER: CPT | Performed by: NURSE PRACTITIONER

## 2020-01-01 PROCEDURE — 93010 ELECTROCARDIOGRAM REPORT: CPT | Performed by: INTERNAL MEDICINE

## 2020-01-01 PROCEDURE — C9113 INJ PANTOPRAZOLE SODIUM, VIA: HCPCS | Performed by: EMERGENCY MEDICINE

## 2020-01-01 PROCEDURE — 86900 BLOOD TYPING SEROLOGIC ABO: CPT

## 2020-01-01 PROCEDURE — 36430 TRANSFUSION BLD/BLD COMPNT: CPT

## 2020-01-01 PROCEDURE — 85025 COMPLETE CBC W/AUTO DIFF WBC: CPT

## 2020-01-01 PROCEDURE — C9113 INJ PANTOPRAZOLE SODIUM, VIA: HCPCS | Performed by: CLINICAL NURSE SPECIALIST

## 2020-01-01 PROCEDURE — 6360000002 HC RX W HCPCS: Performed by: INTERNAL MEDICINE

## 2020-01-01 PROCEDURE — 6360000002 HC RX W HCPCS: Performed by: STUDENT IN AN ORGANIZED HEALTH CARE EDUCATION/TRAINING PROGRAM

## 2020-01-01 PROCEDURE — 81001 URINALYSIS AUTO W/SCOPE: CPT

## 2020-01-01 PROCEDURE — 2580000003 HC RX 258: Performed by: EMERGENCY MEDICINE

## 2020-01-01 PROCEDURE — 6370000000 HC RX 637 (ALT 250 FOR IP): Performed by: INTERNAL MEDICINE

## 2020-01-01 PROCEDURE — G8417 CALC BMI ABV UP PARAM F/U: HCPCS | Performed by: NURSE PRACTITIONER

## 2020-01-01 PROCEDURE — 6360000002 HC RX W HCPCS: Performed by: EMERGENCY MEDICINE

## 2020-01-01 PROCEDURE — 99211 OFF/OP EST MAY X REQ PHY/QHP: CPT | Performed by: NURSE PRACTITIONER

## 2020-01-01 PROCEDURE — 99285 EMERGENCY DEPT VISIT HI MDM: CPT

## 2020-01-01 PROCEDURE — 80048 BASIC METABOLIC PNL TOTAL CA: CPT

## 2020-01-01 PROCEDURE — 80053 COMPREHEN METABOLIC PANEL: CPT

## 2020-01-01 PROCEDURE — 1090F PRES/ABSN URINE INCON ASSESS: CPT | Performed by: NURSE PRACTITIONER

## 2020-01-01 PROCEDURE — 97535 SELF CARE MNGMENT TRAINING: CPT

## 2020-01-01 PROCEDURE — 1111F DSCHRG MED/CURRENT MED MERGE: CPT | Performed by: NURSE PRACTITIONER

## 2020-01-01 PROCEDURE — 2580000003 HC RX 258: Performed by: INTERNAL MEDICINE

## 2020-01-01 PROCEDURE — 87088 URINE BACTERIA CULTURE: CPT

## 2020-01-01 PROCEDURE — C9113 INJ PANTOPRAZOLE SODIUM, VIA: HCPCS | Performed by: STUDENT IN AN ORGANIZED HEALTH CARE EDUCATION/TRAINING PROGRAM

## 2020-01-01 PROCEDURE — 6370000000 HC RX 637 (ALT 250 FOR IP): Performed by: CLINICAL NURSE SPECIALIST

## 2020-01-01 PROCEDURE — 0202U NFCT DS 22 TRGT SARS-COV-2: CPT

## 2020-01-01 PROCEDURE — 99221 1ST HOSP IP/OBS SF/LOW 40: CPT | Performed by: SURGERY

## 2020-01-01 PROCEDURE — 1123F ACP DISCUSS/DSCN MKR DOCD: CPT | Performed by: NURSE PRACTITIONER

## 2020-01-01 PROCEDURE — 86880 COOMBS TEST DIRECT: CPT

## 2020-01-01 PROCEDURE — 96372 THER/PROPH/DIAG INJ SC/IM: CPT

## 2020-01-01 PROCEDURE — 2580000003 HC RX 258: Performed by: CLINICAL NURSE SPECIALIST

## 2020-01-01 PROCEDURE — 72125 CT NECK SPINE W/O DYE: CPT

## 2020-01-01 PROCEDURE — 96361 HYDRATE IV INFUSION ADD-ON: CPT

## 2020-01-01 PROCEDURE — 87077 CULTURE AEROBIC IDENTIFY: CPT

## 2020-01-01 PROCEDURE — 86922 COMPATIBILITY TEST ANTIGLOB: CPT

## 2020-01-01 PROCEDURE — 84484 ASSAY OF TROPONIN QUANT: CPT

## 2020-01-01 PROCEDURE — 86140 C-REACTIVE PROTEIN: CPT

## 2020-01-01 PROCEDURE — 96365 THER/PROPH/DIAG IV INF INIT: CPT

## 2020-01-01 PROCEDURE — 96368 THER/DIAG CONCURRENT INF: CPT

## 2020-01-01 PROCEDURE — 83690 ASSAY OF LIPASE: CPT

## 2020-01-01 PROCEDURE — 93005 ELECTROCARDIOGRAM TRACING: CPT | Performed by: EMERGENCY MEDICINE

## 2020-01-01 PROCEDURE — 99284 EMERGENCY DEPT VISIT MOD MDM: CPT

## 2020-01-01 PROCEDURE — 2580000003 HC RX 258: Performed by: SURGERY

## 2020-01-01 PROCEDURE — 83880 ASSAY OF NATRIURETIC PEPTIDE: CPT

## 2020-01-01 PROCEDURE — 99442 PR PHYS/QHP TELEPHONE EVALUATION 11-20 MIN: CPT | Performed by: NURSE PRACTITIONER

## 2020-01-01 PROCEDURE — 99233 SBSQ HOSP IP/OBS HIGH 50: CPT | Performed by: INTERNAL MEDICINE

## 2020-01-01 PROCEDURE — 2500000003 HC RX 250 WO HCPCS: Performed by: EMERGENCY MEDICINE

## 2020-01-01 PROCEDURE — 99204 OFFICE O/P NEW MOD 45 MIN: CPT | Performed by: NURSE PRACTITIONER

## 2020-01-01 PROCEDURE — 85027 COMPLETE CBC AUTOMATED: CPT

## 2020-01-01 PROCEDURE — 85651 RBC SED RATE NONAUTOMATED: CPT

## 2020-01-01 PROCEDURE — 99212 OFFICE O/P EST SF 10 MIN: CPT | Performed by: NURSE PRACTITIONER

## 2020-01-01 PROCEDURE — G0378 HOSPITAL OBSERVATION PER HR: HCPCS

## 2020-01-01 PROCEDURE — 90694 VACC AIIV4 NO PRSRV 0.5ML IM: CPT | Performed by: NURSE PRACTITIONER

## 2020-01-01 PROCEDURE — 97530 THERAPEUTIC ACTIVITIES: CPT

## 2020-01-01 PROCEDURE — 83605 ASSAY OF LACTIC ACID: CPT

## 2020-01-01 PROCEDURE — 74176 CT ABD & PELVIS W/O CONTRAST: CPT

## 2020-01-01 PROCEDURE — 6370000000 HC RX 637 (ALT 250 FOR IP): Performed by: FAMILY MEDICINE

## 2020-01-01 PROCEDURE — 97161 PT EVAL LOW COMPLEX 20 MIN: CPT

## 2020-01-01 PROCEDURE — 71045 X-RAY EXAM CHEST 1 VIEW: CPT

## 2020-01-01 PROCEDURE — 4040F PNEUMOC VAC/ADMIN/RCVD: CPT | Performed by: NURSE PRACTITIONER

## 2020-01-01 PROCEDURE — 96375 TX/PRO/DX INJ NEW DRUG ADDON: CPT

## 2020-01-01 PROCEDURE — 2700000000 HC OXYGEN THERAPY PER DAY

## 2020-01-01 PROCEDURE — P9016 RBC LEUKOCYTES REDUCED: HCPCS

## 2020-01-01 PROCEDURE — 86850 RBC ANTIBODY SCREEN: CPT

## 2020-01-01 PROCEDURE — 6360000002 HC RX W HCPCS: Performed by: CLINICAL NURSE SPECIALIST

## 2020-01-01 PROCEDURE — 2580000003 HC RX 258: Performed by: STUDENT IN AN ORGANIZED HEALTH CARE EDUCATION/TRAINING PROGRAM

## 2020-01-01 PROCEDURE — 99496 TRANSJ CARE MGMT HIGH F2F 7D: CPT | Performed by: NURSE PRACTITIONER

## 2020-01-01 PROCEDURE — 87040 BLOOD CULTURE FOR BACTERIA: CPT

## 2020-01-01 PROCEDURE — 86038 ANTINUCLEAR ANTIBODIES: CPT

## 2020-01-01 PROCEDURE — G0008 ADMIN INFLUENZA VIRUS VAC: HCPCS | Performed by: NURSE PRACTITIONER

## 2020-01-01 PROCEDURE — 99215 OFFICE O/P EST HI 40 MIN: CPT | Performed by: NURSE PRACTITIONER

## 2020-01-01 PROCEDURE — 1200000000 HC SEMI PRIVATE

## 2020-01-01 PROCEDURE — 87150 DNA/RNA AMPLIFIED PROBE: CPT

## 2020-01-01 PROCEDURE — 99442 PR PHYS/QHP TELEPHONE EVALUATION 11-20 MIN: CPT | Performed by: STUDENT IN AN ORGANIZED HEALTH CARE EDUCATION/TRAINING PROGRAM

## 2020-01-01 PROCEDURE — 99214 OFFICE O/P EST MOD 30 MIN: CPT | Performed by: NURSE PRACTITIONER

## 2020-01-01 PROCEDURE — 6370000000 HC RX 637 (ALT 250 FOR IP): Performed by: EMERGENCY MEDICINE

## 2020-01-01 PROCEDURE — 70450 CT HEAD/BRAIN W/O DYE: CPT

## 2020-01-01 PROCEDURE — 93005 ELECTROCARDIOGRAM TRACING: CPT | Performed by: NURSE PRACTITIONER

## 2020-01-01 PROCEDURE — 71250 CT THORAX DX C-: CPT

## 2020-01-01 PROCEDURE — G8427 DOCREV CUR MEDS BY ELIG CLIN: HCPCS | Performed by: NURSE PRACTITIONER

## 2020-01-01 PROCEDURE — 81003 URINALYSIS AUTO W/O SCOPE: CPT

## 2020-01-01 PROCEDURE — 97165 OT EVAL LOW COMPLEX 30 MIN: CPT

## 2020-01-01 PROCEDURE — 86235 NUCLEAR ANTIGEN ANTIBODY: CPT

## 2020-01-01 PROCEDURE — 86901 BLOOD TYPING SEROLOGIC RH(D): CPT

## 2020-01-01 PROCEDURE — 97110 THERAPEUTIC EXERCISES: CPT

## 2020-01-01 PROCEDURE — 99232 SBSQ HOSP IP/OBS MODERATE 35: CPT | Performed by: INTERNAL MEDICINE

## 2020-01-01 PROCEDURE — G8482 FLU IMMUNIZE ORDER/ADMIN: HCPCS | Performed by: NURSE PRACTITIONER

## 2020-01-01 PROCEDURE — 86870 RBC ANTIBODY IDENTIFICATION: CPT

## 2020-01-01 PROCEDURE — 96374 THER/PROPH/DIAG INJ IV PUSH: CPT

## 2020-01-01 PROCEDURE — 69210 REMOVE IMPACTED EAR WAX UNI: CPT | Performed by: FAMILY MEDICINE

## 2020-01-01 PROCEDURE — 93005 ELECTROCARDIOGRAM TRACING: CPT | Performed by: STUDENT IN AN ORGANIZED HEALTH CARE EDUCATION/TRAINING PROGRAM

## 2020-01-01 PROCEDURE — 97162 PT EVAL MOD COMPLEX 30 MIN: CPT

## 2020-01-01 PROCEDURE — 85014 HEMATOCRIT: CPT

## 2020-01-01 PROCEDURE — 99205 OFFICE O/P NEW HI 60 MIN: CPT | Performed by: NURSE PRACTITIONER

## 2020-01-01 PROCEDURE — 80307 DRUG TEST PRSMV CHEM ANLYZR: CPT

## 2020-01-01 PROCEDURE — 85018 HEMOGLOBIN: CPT

## 2020-01-01 PROCEDURE — P9612 CATHETERIZE FOR URINE SPEC: HCPCS

## 2020-01-01 PROCEDURE — 90732 PPSV23 VACC 2 YRS+ SUBQ/IM: CPT | Performed by: NURSE PRACTITIONER

## 2020-01-01 PROCEDURE — 87186 SC STD MICRODIL/AGAR DIL: CPT

## 2020-01-01 PROCEDURE — 99223 1ST HOSP IP/OBS HIGH 75: CPT | Performed by: INTERNAL MEDICINE

## 2020-01-01 PROCEDURE — 76705 ECHO EXAM OF ABDOMEN: CPT

## 2020-01-01 PROCEDURE — 71046 X-RAY EXAM CHEST 2 VIEWS: CPT

## 2020-01-01 PROCEDURE — 90653 IIV ADJUVANT VACCINE IM: CPT | Performed by: NURSE PRACTITIONER

## 2020-01-01 PROCEDURE — G0009 ADMIN PNEUMOCOCCAL VACCINE: HCPCS | Performed by: NURSE PRACTITIONER

## 2020-01-01 PROCEDURE — 80076 HEPATIC FUNCTION PANEL: CPT

## 2020-01-01 PROCEDURE — G8484 FLU IMMUNIZE NO ADMIN: HCPCS | Performed by: NURSE PRACTITIONER

## 2020-01-01 RX ORDER — ACETAMINOPHEN 500 MG
500 TABLET ORAL EVERY 6 HOURS PRN
Status: ON HOLD | COMMUNITY
End: 2020-01-01 | Stop reason: HOSPADM

## 2020-01-01 RX ORDER — LISINOPRIL 10 MG/1
10 TABLET ORAL DAILY
Qty: 90 TABLET | Refills: 1 | Status: SHIPPED
Start: 2020-01-01 | End: 2020-01-01

## 2020-01-01 RX ORDER — PANTOPRAZOLE SODIUM 40 MG/1
40 TABLET, DELAYED RELEASE ORAL
Status: DISCONTINUED | OUTPATIENT
Start: 2020-01-01 | End: 2020-01-01 | Stop reason: HOSPADM

## 2020-01-01 RX ORDER — IPRATROPIUM BROMIDE AND ALBUTEROL SULFATE 2.5; .5 MG/3ML; MG/3ML
3 SOLUTION RESPIRATORY (INHALATION) EVERY 4 HOURS PRN
Qty: 360 ML | Refills: 0 | Status: SHIPPED | OUTPATIENT
Start: 2020-01-01 | End: 2020-01-01

## 2020-01-01 RX ORDER — ONDANSETRON 2 MG/ML
4 INJECTION INTRAMUSCULAR; INTRAVENOUS ONCE
Status: COMPLETED | OUTPATIENT
Start: 2020-01-01 | End: 2020-01-01

## 2020-01-01 RX ORDER — LISINOPRIL 10 MG/1
TABLET ORAL
Qty: 90 TABLET | Refills: 1 | Status: ON HOLD | COMMUNITY
Start: 2020-01-01 | End: 2020-01-01 | Stop reason: HOSPADM

## 2020-01-01 RX ORDER — SODIUM CHLORIDE 9 MG/ML
INJECTION, SOLUTION INTRAVENOUS CONTINUOUS
Status: DISCONTINUED | OUTPATIENT
Start: 2020-01-01 | End: 2020-01-01

## 2020-01-01 RX ORDER — PREDNISONE 10 MG/1
TABLET ORAL
Qty: 30 TABLET | Refills: 0 | Status: SHIPPED | OUTPATIENT
Start: 2020-01-01 | End: 2020-01-01

## 2020-01-01 RX ORDER — ACETAMINOPHEN 325 MG/1
650 TABLET ORAL EVERY 6 HOURS PRN
Status: DISCONTINUED | OUTPATIENT
Start: 2020-01-01 | End: 2020-01-01 | Stop reason: HOSPADM

## 2020-01-01 RX ORDER — ACETAMINOPHEN AND CODEINE PHOSPHATE 300; 30 MG/1; MG/1
1 TABLET ORAL EVERY 6 HOURS PRN
Qty: 28 TABLET | Refills: 0 | Status: SHIPPED | OUTPATIENT
Start: 2020-01-01 | End: 2020-01-01

## 2020-01-01 RX ORDER — IPRATROPIUM BROMIDE AND ALBUTEROL SULFATE 2.5; .5 MG/3ML; MG/3ML
3 SOLUTION RESPIRATORY (INHALATION) EVERY 4 HOURS PRN
Status: DISCONTINUED | OUTPATIENT
Start: 2020-01-01 | End: 2020-01-01 | Stop reason: HOSPADM

## 2020-01-01 RX ORDER — MIRTAZAPINE 45 MG/1
45 TABLET, FILM COATED ORAL NIGHTLY
Qty: 30 TABLET | Refills: 2 | Status: SHIPPED
Start: 2020-01-01 | End: 2020-01-01 | Stop reason: SDUPTHER

## 2020-01-01 RX ORDER — SODIUM CHLORIDE 0.9 % (FLUSH) 0.9 %
10 SYRINGE (ML) INJECTION PRN
Status: DISCONTINUED | OUTPATIENT
Start: 2020-01-01 | End: 2020-01-01 | Stop reason: HOSPADM

## 2020-01-01 RX ORDER — HYDRALAZINE HYDROCHLORIDE 20 MG/ML
5 INJECTION INTRAMUSCULAR; INTRAVENOUS EVERY 6 HOURS PRN
Status: DISCONTINUED | OUTPATIENT
Start: 2020-01-01 | End: 2020-01-01 | Stop reason: HOSPADM

## 2020-01-01 RX ORDER — MIRTAZAPINE 15 MG/1
15 TABLET, FILM COATED ORAL NIGHTLY
Status: DISCONTINUED | OUTPATIENT
Start: 2020-01-01 | End: 2020-01-01 | Stop reason: HOSPADM

## 2020-01-01 RX ORDER — FENTANYL CITRATE 50 UG/ML
50 INJECTION, SOLUTION INTRAMUSCULAR; INTRAVENOUS ONCE
Status: COMPLETED | OUTPATIENT
Start: 2020-01-01 | End: 2020-01-01

## 2020-01-01 RX ORDER — 0.9 % SODIUM CHLORIDE 0.9 %
1000 INTRAVENOUS SOLUTION INTRAVENOUS ONCE
Status: COMPLETED | OUTPATIENT
Start: 2020-01-01 | End: 2020-01-01

## 2020-01-01 RX ORDER — ACETAMINOPHEN 500 MG
1000 TABLET ORAL ONCE
Status: COMPLETED | OUTPATIENT
Start: 2020-01-01 | End: 2020-01-01

## 2020-01-01 RX ORDER — 0.9 % SODIUM CHLORIDE 0.9 %
1500 INTRAVENOUS SOLUTION INTRAVENOUS ONCE
Status: COMPLETED | OUTPATIENT
Start: 2020-01-01 | End: 2020-01-01

## 2020-01-01 RX ORDER — ONDANSETRON 2 MG/ML
4 INJECTION INTRAMUSCULAR; INTRAVENOUS EVERY 6 HOURS PRN
Status: DISCONTINUED | OUTPATIENT
Start: 2020-01-01 | End: 2020-01-01 | Stop reason: HOSPADM

## 2020-01-01 RX ORDER — SUCRALFATE 1 G/1
1 TABLET ORAL 4 TIMES DAILY
Status: DISCONTINUED | OUTPATIENT
Start: 2020-01-01 | End: 2020-01-01 | Stop reason: HOSPADM

## 2020-01-01 RX ORDER — ALENDRONATE SODIUM 70 MG/1
70 TABLET ORAL
Status: DISCONTINUED | OUTPATIENT
Start: 2020-01-01 | End: 2020-01-01 | Stop reason: CLARIF

## 2020-01-01 RX ORDER — POLYETHYLENE GLYCOL 3350 17 G/17G
17 POWDER, FOR SOLUTION ORAL DAILY
Qty: 510 G | Refills: 5 | Status: SHIPPED | OUTPATIENT
Start: 2020-01-01 | End: 2020-01-01

## 2020-01-01 RX ORDER — PANTOPRAZOLE SODIUM 40 MG/1
40 TABLET, DELAYED RELEASE ORAL
Qty: 90 TABLET | Refills: 3 | Status: SHIPPED | OUTPATIENT
Start: 2020-01-01 | End: 2020-01-01

## 2020-01-01 RX ORDER — CEFDINIR 300 MG/1
300 CAPSULE ORAL 2 TIMES DAILY
Qty: 14 CAPSULE | Refills: 0 | Status: SHIPPED | OUTPATIENT
Start: 2020-01-01 | End: 2020-01-01

## 2020-01-01 RX ORDER — KETOROLAC TROMETHAMINE 30 MG/ML
15 INJECTION, SOLUTION INTRAMUSCULAR; INTRAVENOUS ONCE
Status: COMPLETED | OUTPATIENT
Start: 2020-01-01 | End: 2020-01-01

## 2020-01-01 RX ORDER — MIRTAZAPINE 45 MG/1
45 TABLET, FILM COATED ORAL NIGHTLY
Qty: 30 TABLET | Refills: 2 | Status: SHIPPED | OUTPATIENT
Start: 2020-01-01 | End: 2020-01-01 | Stop reason: SDUPTHER

## 2020-01-01 RX ORDER — HYDROCODONE BITARTRATE AND ACETAMINOPHEN 5; 325 MG/1; MG/1
1 TABLET ORAL EVERY 6 HOURS PRN
Qty: 120 TABLET | Refills: 0 | Status: SHIPPED
Start: 2020-01-01 | End: 2020-01-01 | Stop reason: SDUPTHER

## 2020-01-01 RX ORDER — MIRTAZAPINE 15 MG/1
45 TABLET, FILM COATED ORAL NIGHTLY
Status: DISCONTINUED | OUTPATIENT
Start: 2020-01-01 | End: 2020-01-01 | Stop reason: HOSPADM

## 2020-01-01 RX ORDER — 0.9 % SODIUM CHLORIDE 0.9 %
500 INTRAVENOUS SOLUTION INTRAVENOUS ONCE
Status: COMPLETED | OUTPATIENT
Start: 2020-01-01 | End: 2020-01-01

## 2020-01-01 RX ORDER — NITROFURANTOIN 25; 75 MG/1; MG/1
100 CAPSULE ORAL 2 TIMES DAILY
Qty: 14 CAPSULE | Refills: 0 | Status: SHIPPED | OUTPATIENT
Start: 2020-01-01 | End: 2020-01-01

## 2020-01-01 RX ORDER — 0.9 % SODIUM CHLORIDE 0.9 %
20 INTRAVENOUS SOLUTION INTRAVENOUS ONCE
Status: COMPLETED | OUTPATIENT
Start: 2020-01-01 | End: 2020-01-01

## 2020-01-01 RX ORDER — MORPHINE SULFATE 20 MG/ML
2.5 SOLUTION ORAL EVERY 4 HOURS PRN
Status: DISCONTINUED | OUTPATIENT
Start: 2020-01-01 | End: 2020-01-01 | Stop reason: HOSPADM

## 2020-01-01 RX ORDER — DIPHENHYDRAMINE HCL 25 MG
25 TABLET ORAL EVERY 6 HOURS PRN
Status: DISCONTINUED | OUTPATIENT
Start: 2020-01-01 | End: 2020-01-01 | Stop reason: HOSPADM

## 2020-01-01 RX ORDER — PANTOPRAZOLE SODIUM 40 MG/1
40 TABLET, DELAYED RELEASE ORAL
Qty: 30 TABLET | Refills: 0 | Status: SHIPPED | OUTPATIENT
Start: 2020-01-01 | End: 2020-01-01 | Stop reason: SDUPTHER

## 2020-01-01 RX ORDER — PANTOPRAZOLE SODIUM 40 MG/1
40 TABLET, DELAYED RELEASE ORAL
Status: DISCONTINUED | OUTPATIENT
Start: 2020-01-01 | End: 2020-01-01

## 2020-01-01 RX ORDER — LORAZEPAM 0.5 MG/1
0.5 TABLET ORAL EVERY 4 HOURS PRN
Status: DISCONTINUED | OUTPATIENT
Start: 2020-01-01 | End: 2020-01-01 | Stop reason: HOSPADM

## 2020-01-01 RX ORDER — HYDROCODONE BITARTRATE AND ACETAMINOPHEN 5; 325 MG/1; MG/1
.5-1 TABLET ORAL EVERY 6 HOURS PRN
Qty: 120 TABLET | Refills: 0 | Status: SHIPPED
Start: 2020-01-01 | End: 2020-01-01 | Stop reason: SDUPTHER

## 2020-01-01 RX ORDER — SUCRALFATE 1 G/1
1 TABLET ORAL 4 TIMES DAILY
Qty: 120 TABLET | Refills: 3 | Status: SHIPPED | OUTPATIENT
Start: 2020-01-01

## 2020-01-01 RX ORDER — DEXAMETHASONE SODIUM PHOSPHATE 4 MG/ML
4 INJECTION, SOLUTION INTRA-ARTICULAR; INTRALESIONAL; INTRAMUSCULAR; INTRAVENOUS; SOFT TISSUE 2 TIMES DAILY
Status: DISCONTINUED | OUTPATIENT
Start: 2020-01-01 | End: 2020-01-01 | Stop reason: HOSPADM

## 2020-01-01 RX ORDER — DOXYCYCLINE HYCLATE 100 MG
100 TABLET ORAL 2 TIMES DAILY
Qty: 20 TABLET | Refills: 0 | Status: SHIPPED
Start: 2020-01-01 | End: 2020-01-01

## 2020-01-01 RX ORDER — POLYETHYLENE GLYCOL 3350 17 G/17G
17 POWDER, FOR SOLUTION ORAL DAILY
Qty: 1530 G | Refills: 1 | Status: CANCELLED | OUTPATIENT
Start: 2020-01-01 | End: 2020-01-01

## 2020-01-01 RX ORDER — MIRTAZAPINE 30 MG/1
30 TABLET, FILM COATED ORAL NIGHTLY
Qty: 30 TABLET | Refills: 5 | Status: SHIPPED
Start: 2020-01-01 | End: 2020-01-01 | Stop reason: ALTCHOICE

## 2020-01-01 RX ORDER — PETROLATUM 42 G/100G
OINTMENT TOPICAL 3 TIMES DAILY PRN
Status: DISCONTINUED | OUTPATIENT
Start: 2020-01-01 | End: 2020-01-01 | Stop reason: HOSPADM

## 2020-01-01 RX ORDER — HYDROCODONE BITARTRATE AND ACETAMINOPHEN 5; 325 MG/1; MG/1
.5-1 TABLET ORAL EVERY 6 HOURS PRN
Qty: 120 TABLET | Refills: 0 | Status: SHIPPED | OUTPATIENT
Start: 2020-01-01 | End: 2020-01-01 | Stop reason: SDUPTHER

## 2020-01-01 RX ORDER — HYDROCODONE BITARTRATE AND ACETAMINOPHEN 5; 325 MG/1; MG/1
1 TABLET ORAL EVERY 6 HOURS PRN
Qty: 28 TABLET | Refills: 0 | Status: SHIPPED | OUTPATIENT
Start: 2020-01-01 | End: 2020-01-01 | Stop reason: SDUPTHER

## 2020-01-01 RX ORDER — DIPHENHYDRAMINE HCL 25 MG
25 CAPSULE ORAL EVERY 6 HOURS PRN
COMMUNITY

## 2020-01-01 RX ORDER — HYDROCODONE BITARTRATE AND ACETAMINOPHEN 5; 325 MG/1; MG/1
.5-1 TABLET ORAL EVERY 6 HOURS PRN
Qty: 120 TABLET | Refills: 0 | Status: SHIPPED | OUTPATIENT
Start: 2020-01-01 | End: 2020-01-01

## 2020-01-01 RX ORDER — ALENDRONATE SODIUM 70 MG/1
70 TABLET ORAL
Qty: 12 TABLET | Refills: 3 | Status: ON HOLD
Start: 2020-01-01 | End: 2020-01-01 | Stop reason: HOSPADM

## 2020-01-01 RX ORDER — HYDROCODONE BITARTRATE AND ACETAMINOPHEN 5; 325 MG/1; MG/1
1 TABLET ORAL EVERY 6 HOURS PRN
Qty: 28 TABLET | Refills: 0 | Status: SHIPPED
Start: 2020-01-01 | End: 2020-01-01 | Stop reason: SDUPTHER

## 2020-01-01 RX ORDER — PANTOPRAZOLE SODIUM 40 MG/10ML
40 INJECTION, POWDER, LYOPHILIZED, FOR SOLUTION INTRAVENOUS 2 TIMES DAILY
Status: DISCONTINUED | OUTPATIENT
Start: 2020-01-01 | End: 2020-01-01 | Stop reason: HOSPADM

## 2020-01-01 RX ORDER — SODIUM CHLORIDE 0.9 % (FLUSH) 0.9 %
10 SYRINGE (ML) INJECTION PRN
Status: DISCONTINUED | OUTPATIENT
Start: 2020-01-01 | End: 2020-01-01

## 2020-01-01 RX ORDER — HYDROCODONE BITARTRATE AND ACETAMINOPHEN 5; 325 MG/1; MG/1
1 TABLET ORAL EVERY 6 HOURS PRN
Qty: 10 TABLET | Refills: 0 | Status: CANCELLED | OUTPATIENT
Start: 2020-01-01 | End: 2020-01-01

## 2020-01-01 RX ORDER — ALENDRONATE SODIUM 70 MG/1
70 TABLET ORAL
Qty: 12 TABLET | Refills: 3 | Status: SHIPPED
Start: 2020-01-01 | End: 2020-01-01 | Stop reason: SDUPTHER

## 2020-01-01 RX ORDER — MORPHINE SULFATE 20 MG/ML
2.5 SOLUTION ORAL EVERY 4 HOURS PRN
Qty: 15 ML | Refills: 0 | Status: SHIPPED | OUTPATIENT
Start: 2020-01-01 | End: 2020-01-01

## 2020-01-01 RX ORDER — LORAZEPAM 0.5 MG/1
0.5 TABLET ORAL EVERY 4 HOURS PRN
Qty: 12 TABLET | Refills: 0 | Status: SHIPPED | OUTPATIENT
Start: 2020-01-01 | End: 2020-01-01

## 2020-01-01 RX ORDER — PETROLATUM 42 G/100G
OINTMENT TOPICAL 2 TIMES DAILY
Status: DISCONTINUED | OUTPATIENT
Start: 2020-01-01 | End: 2020-01-01 | Stop reason: HOSPADM

## 2020-01-01 RX ORDER — POLYETHYLENE GLYCOL 3350 17 G/17G
17 POWDER, FOR SOLUTION ORAL DAILY
Status: DISCONTINUED | OUTPATIENT
Start: 2020-01-01 | End: 2020-01-01 | Stop reason: HOSPADM

## 2020-01-01 RX ORDER — MIRTAZAPINE 45 MG/1
45 TABLET, FILM COATED ORAL NIGHTLY
Qty: 30 TABLET | Refills: 2 | Status: SHIPPED | OUTPATIENT
Start: 2020-01-01 | End: 2021-02-14

## 2020-01-01 RX ORDER — FENTANYL CITRATE 50 UG/ML
25 INJECTION, SOLUTION INTRAMUSCULAR; INTRAVENOUS ONCE
Status: COMPLETED | OUTPATIENT
Start: 2020-01-01 | End: 2020-01-01

## 2020-01-01 RX ORDER — SUCRALFATE 1 G/1
1 TABLET ORAL 4 TIMES DAILY
Qty: 120 TABLET | Refills: 3 | Status: SHIPPED
Start: 2020-01-01 | End: 2020-01-01 | Stop reason: SDUPTHER

## 2020-01-01 RX ORDER — SODIUM CHLORIDE 0.9 % (FLUSH) 0.9 %
10 SYRINGE (ML) INJECTION EVERY 12 HOURS SCHEDULED
Status: DISCONTINUED | OUTPATIENT
Start: 2020-01-01 | End: 2020-01-01 | Stop reason: HOSPADM

## 2020-01-01 RX ORDER — CEFDINIR 300 MG/1
300 CAPSULE ORAL 2 TIMES DAILY
Qty: 20 CAPSULE | Refills: 0 | Status: ON HOLD | OUTPATIENT
Start: 2020-01-01 | End: 2020-01-01 | Stop reason: HOSPADM

## 2020-01-01 RX ORDER — LISINOPRIL 5 MG/1
5 TABLET ORAL DAILY
Status: DISCONTINUED | OUTPATIENT
Start: 2020-01-01 | End: 2020-01-01 | Stop reason: HOSPADM

## 2020-01-01 RX ORDER — 0.9 % SODIUM CHLORIDE 0.9 %
500 INTRAVENOUS SOLUTION INTRAVENOUS ONCE
Status: DISCONTINUED | OUTPATIENT
Start: 2020-01-01 | End: 2020-01-01

## 2020-01-01 RX ORDER — SODIUM CHLORIDE 0.9 % (FLUSH) 0.9 %
10 SYRINGE (ML) INJECTION EVERY 12 HOURS SCHEDULED
Status: DISCONTINUED | OUTPATIENT
Start: 2020-01-01 | End: 2020-01-01

## 2020-01-01 RX ORDER — DOXYCYCLINE HYCLATE 100 MG/1
100 CAPSULE ORAL 2 TIMES DAILY
Status: DISCONTINUED | OUTPATIENT
Start: 2020-01-01 | End: 2020-01-01 | Stop reason: HOSPADM

## 2020-01-01 RX ORDER — PANTOPRAZOLE SODIUM 40 MG/1
40 TABLET, DELAYED RELEASE ORAL
Qty: 90 TABLET | Refills: 1 | Status: SHIPPED
Start: 2020-01-01 | End: 2020-01-01 | Stop reason: SDUPTHER

## 2020-01-01 RX ORDER — POLYETHYLENE GLYCOL 3350 17 G/17G
17 POWDER, FOR SOLUTION ORAL DAILY
Status: DISCONTINUED | OUTPATIENT
Start: 2020-01-01 | End: 2020-01-01 | Stop reason: CLARIF

## 2020-01-01 RX ORDER — PANTOPRAZOLE SODIUM 40 MG/1
40 TABLET, DELAYED RELEASE ORAL
Qty: 30 TABLET | Refills: 2 | Status: SHIPPED
Start: 2020-01-01 | End: 2020-01-01 | Stop reason: SDUPTHER

## 2020-01-01 RX ORDER — CEFDINIR 300 MG/1
300 CAPSULE ORAL DAILY
Status: DISCONTINUED | OUTPATIENT
Start: 2020-01-01 | End: 2020-01-01 | Stop reason: HOSPADM

## 2020-01-01 RX ORDER — 0.9 % SODIUM CHLORIDE 0.9 %
20 INTRAVENOUS SOLUTION INTRAVENOUS ONCE
Status: DISCONTINUED | OUTPATIENT
Start: 2020-01-01 | End: 2020-01-01 | Stop reason: HOSPADM

## 2020-01-01 RX ORDER — CALCIUM CARBONATE 200(500)MG
500 TABLET,CHEWABLE ORAL DAILY
Status: DISCONTINUED | OUTPATIENT
Start: 2020-01-01 | End: 2020-01-01 | Stop reason: HOSPADM

## 2020-01-01 RX ORDER — LISINOPRIL 10 MG/1
10 TABLET ORAL DAILY
Status: DISCONTINUED | OUTPATIENT
Start: 2020-01-01 | End: 2020-01-01 | Stop reason: HOSPADM

## 2020-01-01 RX ORDER — IPRATROPIUM BROMIDE AND ALBUTEROL SULFATE 2.5; .5 MG/3ML; MG/3ML
1 SOLUTION RESPIRATORY (INHALATION) EVERY 4 HOURS PRN
Status: DISCONTINUED | OUTPATIENT
Start: 2020-01-01 | End: 2020-01-01 | Stop reason: HOSPADM

## 2020-01-01 RX ADMIN — SUCRALFATE 1 G: 1 TABLET ORAL at 15:11

## 2020-01-01 RX ADMIN — CALCIUM CARBONATE (ANTACID) CHEW TAB 500 MG 500 MG: 500 CHEW TAB at 09:02

## 2020-01-01 RX ADMIN — PETROLATUM: 42 OINTMENT TOPICAL at 09:00

## 2020-01-01 RX ADMIN — SODIUM CHLORIDE, PRESERVATIVE FREE 10 ML: 5 INJECTION INTRAVENOUS at 20:31

## 2020-01-01 RX ADMIN — METHYLNALTREXONE BROMIDE 6 MG: 12 INJECTION, SOLUTION SUBCUTANEOUS at 17:09

## 2020-01-01 RX ADMIN — SUCRALFATE 1 G: 1 TABLET ORAL at 16:58

## 2020-01-01 RX ADMIN — ENOXAPARIN SODIUM 30 MG: 30 INJECTION SUBCUTANEOUS at 09:55

## 2020-01-01 RX ADMIN — PETROLATUM: 42 OINTMENT TOPICAL at 21:24

## 2020-01-01 RX ADMIN — SUCRALFATE 1 G: 1 TABLET ORAL at 13:21

## 2020-01-01 RX ADMIN — CALCIUM CARBONATE (ANTACID) CHEW TAB 500 MG 500 MG: 500 CHEW TAB at 08:17

## 2020-01-01 RX ADMIN — SUCRALFATE 1 G: 1 TABLET ORAL at 13:10

## 2020-01-01 RX ADMIN — SUCRALFATE 1 G: 1 TABLET ORAL at 17:25

## 2020-01-01 RX ADMIN — MIRTAZAPINE 45 MG: 15 TABLET, FILM COATED ORAL at 20:13

## 2020-01-01 RX ADMIN — PANTOPRAZOLE SODIUM 40 MG: 40 TABLET, DELAYED RELEASE ORAL at 06:55

## 2020-01-01 RX ADMIN — SODIUM CHLORIDE 1000 ML: 9 INJECTION, SOLUTION INTRAVENOUS at 12:37

## 2020-01-01 RX ADMIN — CEFTRIAXONE 1 G: 1 INJECTION, POWDER, FOR SOLUTION INTRAMUSCULAR; INTRAVENOUS at 20:35

## 2020-01-01 RX ADMIN — ACETAMINOPHEN 650 MG: 325 TABLET ORAL at 00:17

## 2020-01-01 RX ADMIN — LISINOPRIL 10 MG: 10 TABLET ORAL at 09:02

## 2020-01-01 RX ADMIN — PETROLATUM: 42 OINTMENT TOPICAL at 09:57

## 2020-01-01 RX ADMIN — KETOROLAC TROMETHAMINE 15 MG: 30 INJECTION, SOLUTION INTRAMUSCULAR; INTRAVENOUS at 19:28

## 2020-01-01 RX ADMIN — PETROLATUM: 42 OINTMENT TOPICAL at 09:36

## 2020-01-01 RX ADMIN — DEXAMETHASONE SODIUM PHOSPHATE 4 MG: 4 INJECTION, SOLUTION INTRAMUSCULAR; INTRAVENOUS at 09:57

## 2020-01-01 RX ADMIN — VANCOMYCIN HYDROCHLORIDE 750 MG: 10 INJECTION, POWDER, LYOPHILIZED, FOR SOLUTION INTRAVENOUS at 15:12

## 2020-01-01 RX ADMIN — PANTOPRAZOLE SODIUM 40 MG: 40 TABLET, DELAYED RELEASE ORAL at 08:40

## 2020-01-01 RX ADMIN — ACETAMINOPHEN 1000 MG: 500 TABLET ORAL at 19:28

## 2020-01-01 RX ADMIN — SUCRALFATE 1 G: 1 TABLET ORAL at 13:46

## 2020-01-01 RX ADMIN — DEXAMETHASONE SODIUM PHOSPHATE 4 MG: 4 INJECTION, SOLUTION INTRAMUSCULAR; INTRAVENOUS at 21:24

## 2020-01-01 RX ADMIN — SUCRALFATE 1 G: 1 TABLET ORAL at 20:13

## 2020-01-01 RX ADMIN — PANTOPRAZOLE SODIUM 40 MG: 40 TABLET, DELAYED RELEASE ORAL at 06:04

## 2020-01-01 RX ADMIN — LISINOPRIL 5 MG: 5 TABLET ORAL at 09:42

## 2020-01-01 RX ADMIN — LISINOPRIL 5 MG: 5 TABLET ORAL at 09:56

## 2020-01-01 RX ADMIN — SODIUM CHLORIDE: 9 INJECTION, SOLUTION INTRAVENOUS at 03:21

## 2020-01-01 RX ADMIN — MIRTAZAPINE 45 MG: 15 TABLET, FILM COATED ORAL at 20:48

## 2020-01-01 RX ADMIN — SUCRALFATE 1 G: 1 TABLET ORAL at 09:35

## 2020-01-01 RX ADMIN — DOXYCYCLINE HYCLATE 100 MG: 100 CAPSULE ORAL at 21:24

## 2020-01-01 RX ADMIN — PANTOPRAZOLE SODIUM 40 MG: 40 INJECTION, POWDER, FOR SOLUTION INTRAVENOUS at 23:44

## 2020-01-01 RX ADMIN — MIRTAZAPINE 45 MG: 15 TABLET, FILM COATED ORAL at 21:24

## 2020-01-01 RX ADMIN — DOXYCYCLINE 100 MG: 100 INJECTION, POWDER, LYOPHILIZED, FOR SOLUTION INTRAVENOUS at 20:42

## 2020-01-01 RX ADMIN — FENTANYL CITRATE 50 MCG: 50 INJECTION, SOLUTION INTRAMUSCULAR; INTRAVENOUS at 13:19

## 2020-01-01 RX ADMIN — SUCRALFATE 1 G: 1 TABLET ORAL at 12:47

## 2020-01-01 RX ADMIN — SUCRALFATE 1 G: 1 TABLET ORAL at 17:55

## 2020-01-01 RX ADMIN — DEXAMETHASONE SODIUM PHOSPHATE 4 MG: 4 INJECTION, SOLUTION INTRAMUSCULAR; INTRAVENOUS at 20:49

## 2020-01-01 RX ADMIN — CEFDINIR 300 MG: 300 CAPSULE ORAL at 09:57

## 2020-01-01 RX ADMIN — MIRTAZAPINE 45 MG: 15 TABLET, FILM COATED ORAL at 20:23

## 2020-01-01 RX ADMIN — DEXAMETHASONE SODIUM PHOSPHATE 4 MG: 4 INJECTION, SOLUTION INTRAMUSCULAR; INTRAVENOUS at 09:35

## 2020-01-01 RX ADMIN — ONDANSETRON 4 MG: 2 INJECTION INTRAMUSCULAR; INTRAVENOUS at 13:19

## 2020-01-01 RX ADMIN — DOXYCYCLINE 100 MG: 100 INJECTION, POWDER, LYOPHILIZED, FOR SOLUTION INTRAVENOUS at 20:55

## 2020-01-01 RX ADMIN — POLYETHYLENE GLYCOL 3350 17 G: 17 POWDER, FOR SOLUTION ORAL at 09:35

## 2020-01-01 RX ADMIN — SODIUM CHLORIDE 500 ML: 9 INJECTION, SOLUTION INTRAVENOUS at 14:24

## 2020-01-01 RX ADMIN — FENTANYL CITRATE 50 MCG: 50 INJECTION, SOLUTION INTRAMUSCULAR; INTRAVENOUS at 16:25

## 2020-01-01 RX ADMIN — DEXAMETHASONE SODIUM PHOSPHATE 4 MG: 4 INJECTION, SOLUTION INTRAMUSCULAR; INTRAVENOUS at 08:33

## 2020-01-01 RX ADMIN — DOXYCYCLINE HYCLATE 100 MG: 100 CAPSULE ORAL at 00:08

## 2020-01-01 RX ADMIN — SUCRALFATE 1 G: 1 TABLET ORAL at 09:42

## 2020-01-01 RX ADMIN — SODIUM CHLORIDE, PRESERVATIVE FREE 10 ML: 5 INJECTION INTRAVENOUS at 09:03

## 2020-01-01 RX ADMIN — LISINOPRIL 10 MG: 10 TABLET ORAL at 08:17

## 2020-01-01 RX ADMIN — PETROLATUM: 42 OINTMENT TOPICAL at 20:14

## 2020-01-01 RX ADMIN — SUCRALFATE 1 G: 1 TABLET ORAL at 17:41

## 2020-01-01 RX ADMIN — SUCRALFATE 1 G: 1 TABLET ORAL at 14:48

## 2020-01-01 RX ADMIN — SUCRALFATE 1 G: 1 TABLET ORAL at 13:59

## 2020-01-01 RX ADMIN — DEXAMETHASONE SODIUM PHOSPHATE 4 MG: 4 INJECTION, SOLUTION INTRAMUSCULAR; INTRAVENOUS at 08:11

## 2020-01-01 RX ADMIN — LISINOPRIL 10 MG: 10 TABLET ORAL at 10:40

## 2020-01-01 RX ADMIN — SUCRALFATE 1 G: 1 TABLET ORAL at 09:39

## 2020-01-01 RX ADMIN — CALCIUM CARBONATE (ANTACID) CHEW TAB 500 MG 500 MG: 500 CHEW TAB at 10:40

## 2020-01-01 RX ADMIN — CEFTRIAXONE SODIUM 1 G: 1 INJECTION, POWDER, FOR SOLUTION INTRAMUSCULAR; INTRAVENOUS at 21:24

## 2020-01-01 RX ADMIN — SODIUM CHLORIDE: 9 INJECTION, SOLUTION INTRAVENOUS at 19:26

## 2020-01-01 RX ADMIN — POLYETHYLENE GLYCOL 3350 17 G: 17 POWDER, FOR SOLUTION ORAL at 09:56

## 2020-01-01 RX ADMIN — SUCRALFATE 1 G: 1 TABLET ORAL at 12:05

## 2020-01-01 RX ADMIN — DEXAMETHASONE SODIUM PHOSPHATE 4 MG: 4 INJECTION, SOLUTION INTRAMUSCULAR; INTRAVENOUS at 09:40

## 2020-01-01 RX ADMIN — PANTOPRAZOLE SODIUM 40 MG: 40 TABLET, DELAYED RELEASE ORAL at 09:42

## 2020-01-01 RX ADMIN — CEFTRIAXONE SODIUM 1 G: 1 INJECTION, POWDER, FOR SOLUTION INTRAMUSCULAR; INTRAVENOUS at 18:23

## 2020-01-01 RX ADMIN — SUCRALFATE 1 G: 1 TABLET ORAL at 08:32

## 2020-01-01 RX ADMIN — DOXYCYCLINE HYCLATE 100 MG: 100 CAPSULE ORAL at 20:49

## 2020-01-01 RX ADMIN — LISINOPRIL 5 MG: 5 TABLET ORAL at 09:39

## 2020-01-01 RX ADMIN — POLYETHYLENE GLYCOL 3350 17 G: 17 POWDER, FOR SOLUTION ORAL at 08:32

## 2020-01-01 RX ADMIN — DEXAMETHASONE SODIUM PHOSPHATE 4 MG: 4 INJECTION, SOLUTION INTRAMUSCULAR; INTRAVENOUS at 03:21

## 2020-01-01 RX ADMIN — DOXYCYCLINE 100 MG: 100 INJECTION, POWDER, LYOPHILIZED, FOR SOLUTION INTRAVENOUS at 19:28

## 2020-01-01 RX ADMIN — SUCRALFATE 1 G: 1 TABLET ORAL at 21:24

## 2020-01-01 RX ADMIN — SUCRALFATE 1 G: 1 TABLET ORAL at 08:17

## 2020-01-01 RX ADMIN — DOXYCYCLINE HYCLATE 100 MG: 100 CAPSULE ORAL at 09:42

## 2020-01-01 RX ADMIN — PETROLATUM: 42 OINTMENT TOPICAL at 08:33

## 2020-01-01 RX ADMIN — DOXYCYCLINE HYCLATE 100 MG: 100 CAPSULE ORAL at 09:35

## 2020-01-01 RX ADMIN — SODIUM CHLORIDE, PRESERVATIVE FREE 10 ML: 5 INJECTION INTRAVENOUS at 10:41

## 2020-01-01 RX ADMIN — SODIUM CHLORIDE, PRESERVATIVE FREE 10 ML: 5 INJECTION INTRAVENOUS at 23:45

## 2020-01-01 RX ADMIN — SUCRALFATE 1 G: 1 TABLET ORAL at 17:07

## 2020-01-01 RX ADMIN — SUCRALFATE 1 G: 1 TABLET ORAL at 08:40

## 2020-01-01 RX ADMIN — SODIUM CHLORIDE 1000 ML: 9 INJECTION, SOLUTION INTRAVENOUS at 13:19

## 2020-01-01 RX ADMIN — LISINOPRIL 5 MG: 5 TABLET ORAL at 09:35

## 2020-01-01 RX ADMIN — SUCRALFATE 1 G: 1 TABLET ORAL at 20:49

## 2020-01-01 RX ADMIN — MIRTAZAPINE 45 MG: 15 TABLET, FILM COATED ORAL at 00:08

## 2020-01-01 RX ADMIN — ENOXAPARIN SODIUM 30 MG: 30 INJECTION SUBCUTANEOUS at 09:39

## 2020-01-01 RX ADMIN — FENTANYL CITRATE 25 MCG: 50 INJECTION, SOLUTION INTRAMUSCULAR; INTRAVENOUS at 14:37

## 2020-01-01 RX ADMIN — DOXYCYCLINE HYCLATE 100 MG: 100 CAPSULE ORAL at 08:10

## 2020-01-01 RX ADMIN — CEFTRIAXONE SODIUM 1 G: 1 INJECTION, POWDER, FOR SOLUTION INTRAMUSCULAR; INTRAVENOUS at 20:49

## 2020-01-01 RX ADMIN — SUCRALFATE 1 G: 1 TABLET ORAL at 20:31

## 2020-01-01 RX ADMIN — PETROLATUM: 42 OINTMENT TOPICAL at 20:27

## 2020-01-01 RX ADMIN — DEXAMETHASONE SODIUM PHOSPHATE 4 MG: 4 INJECTION, SOLUTION INTRAMUSCULAR; INTRAVENOUS at 21:15

## 2020-01-01 RX ADMIN — SODIUM CHLORIDE: 9 INJECTION, SOLUTION INTRAVENOUS at 20:31

## 2020-01-01 RX ADMIN — DOXYCYCLINE HYCLATE 100 MG: 100 CAPSULE ORAL at 08:32

## 2020-01-01 RX ADMIN — PANTOPRAZOLE SODIUM 40 MG: 40 TABLET, DELAYED RELEASE ORAL at 09:56

## 2020-01-01 RX ADMIN — SUCRALFATE 1 G: 1 TABLET ORAL at 20:23

## 2020-01-01 RX ADMIN — CEFDINIR 300 MG: 300 CAPSULE ORAL at 10:26

## 2020-01-01 RX ADMIN — SODIUM CHLORIDE 1000 ML: 9 INJECTION, SOLUTION INTRAVENOUS at 14:53

## 2020-01-01 RX ADMIN — POLYETHYLENE GLYCOL 3350 17 G: 17 POWDER, FOR SOLUTION ORAL at 08:10

## 2020-01-01 RX ADMIN — CEFTRIAXONE 1 G: 1 INJECTION, POWDER, FOR SOLUTION INTRAMUSCULAR; INTRAVENOUS at 19:27

## 2020-01-01 RX ADMIN — SUCRALFATE 1 G: 1 TABLET ORAL at 09:02

## 2020-01-01 RX ADMIN — ENOXAPARIN SODIUM 30 MG: 30 INJECTION SUBCUTANEOUS at 09:35

## 2020-01-01 RX ADMIN — PANTOPRAZOLE SODIUM 40 MG: 40 INJECTION, POWDER, FOR SOLUTION INTRAVENOUS at 10:40

## 2020-01-01 RX ADMIN — DEXAMETHASONE SODIUM PHOSPHATE 4 MG: 4 INJECTION, SOLUTION INTRAMUSCULAR; INTRAVENOUS at 20:13

## 2020-01-01 RX ADMIN — SODIUM CHLORIDE: 9 INJECTION, SOLUTION INTRAVENOUS at 12:09

## 2020-01-01 RX ADMIN — SUCRALFATE 1 G: 1 TABLET ORAL at 21:11

## 2020-01-01 RX ADMIN — PANTOPRAZOLE SODIUM 40 MG: 40 TABLET, DELAYED RELEASE ORAL at 07:06

## 2020-01-01 RX ADMIN — PANTOPRAZOLE SODIUM 40 MG: 40 INJECTION, POWDER, FOR SOLUTION INTRAVENOUS at 09:02

## 2020-01-01 RX ADMIN — SODIUM CHLORIDE 20 ML: 9 INJECTION, SOLUTION INTRAVENOUS at 23:14

## 2020-01-01 RX ADMIN — PANTOPRAZOLE SODIUM 8 MG/HR: 40 INJECTION, POWDER, FOR SOLUTION INTRAVENOUS at 17:29

## 2020-01-01 RX ADMIN — LISINOPRIL 5 MG: 5 TABLET ORAL at 08:32

## 2020-01-01 RX ADMIN — ENOXAPARIN SODIUM 30 MG: 30 INJECTION SUBCUTANEOUS at 08:11

## 2020-01-01 RX ADMIN — PETROLATUM: 42 OINTMENT TOPICAL at 15:06

## 2020-01-01 RX ADMIN — DOXYCYCLINE HYCLATE 100 MG: 100 CAPSULE ORAL at 20:12

## 2020-01-01 RX ADMIN — PETROLATUM: 42 OINTMENT TOPICAL at 20:48

## 2020-01-01 RX ADMIN — PANTOPRAZOLE SODIUM 8 MG/HR: 40 INJECTION, POWDER, FOR SOLUTION INTRAVENOUS at 02:20

## 2020-01-01 RX ADMIN — PANTOPRAZOLE SODIUM 40 MG: 40 INJECTION, POWDER, FOR SOLUTION INTRAVENOUS at 22:56

## 2020-01-01 RX ADMIN — SUCRALFATE 1 G: 1 TABLET ORAL at 08:11

## 2020-01-01 RX ADMIN — DEXAMETHASONE SODIUM PHOSPHATE 4 MG: 4 INJECTION, SOLUTION INTRAMUSCULAR; INTRAVENOUS at 23:20

## 2020-01-01 RX ADMIN — CEFTRIAXONE SODIUM 1 G: 1 INJECTION, POWDER, FOR SOLUTION INTRAMUSCULAR; INTRAVENOUS at 17:55

## 2020-01-01 RX ADMIN — SUCRALFATE 1 G: 1 TABLET ORAL at 22:57

## 2020-01-01 RX ADMIN — DOXYCYCLINE HYCLATE 100 MG: 100 CAPSULE ORAL at 09:39

## 2020-01-01 RX ADMIN — SUCRALFATE 1 G: 1 TABLET ORAL at 23:20

## 2020-01-01 RX ADMIN — CEFDINIR 300 MG: 300 CAPSULE ORAL at 08:10

## 2020-01-01 RX ADMIN — DOXYCYCLINE HYCLATE 100 MG: 100 CAPSULE ORAL at 09:56

## 2020-01-01 RX ADMIN — PANTOPRAZOLE SODIUM 80 MG: 40 INJECTION, POWDER, FOR SOLUTION INTRAVENOUS at 15:58

## 2020-01-01 RX ADMIN — SUCRALFATE 1 G: 1 TABLET ORAL at 09:56

## 2020-01-01 RX ADMIN — SUCRALFATE 1 G: 1 TABLET ORAL at 10:40

## 2020-01-01 RX ADMIN — SUCRALFATE 1 G: 1 TABLET ORAL at 13:50

## 2020-01-01 RX ADMIN — SODIUM CHLORIDE 500 ML: 9 INJECTION, SOLUTION INTRAVENOUS at 15:56

## 2020-01-01 RX ADMIN — CALCIUM CARBONATE (ANTACID) CHEW TAB 500 MG 500 MG: 500 CHEW TAB at 20:31

## 2020-01-01 RX ADMIN — FENTANYL CITRATE 50 MCG: 50 INJECTION, SOLUTION INTRAMUSCULAR; INTRAVENOUS at 14:23

## 2020-01-01 RX ADMIN — POLYETHYLENE GLYCOL 3350 17 G: 17 POWDER, FOR SOLUTION ORAL at 09:43

## 2020-01-01 RX ADMIN — ENOXAPARIN SODIUM 30 MG: 30 INJECTION SUBCUTANEOUS at 08:32

## 2020-01-01 RX ADMIN — CEFDINIR 300 MG: 300 CAPSULE ORAL at 09:39

## 2020-01-01 RX ADMIN — DEXAMETHASONE SODIUM PHOSPHATE 4 MG: 4 INJECTION, SOLUTION INTRAMUSCULAR; INTRAVENOUS at 09:42

## 2020-01-01 RX ADMIN — POLYETHYLENE GLYCOL 3350 17 G: 17 POWDER, FOR SOLUTION ORAL at 09:38

## 2020-01-01 RX ADMIN — SUCRALFATE 1 G: 1 TABLET ORAL at 14:44

## 2020-01-01 RX ADMIN — CEFTRIAXONE SODIUM 1 G: 1 INJECTION, POWDER, FOR SOLUTION INTRAMUSCULAR; INTRAVENOUS at 18:34

## 2020-01-01 RX ADMIN — LISINOPRIL 5 MG: 5 TABLET ORAL at 08:10

## 2020-01-01 RX ADMIN — DOXYCYCLINE HYCLATE 100 MG: 100 CAPSULE ORAL at 20:23

## 2020-01-01 RX ADMIN — PANTOPRAZOLE SODIUM 40 MG: 40 TABLET, DELAYED RELEASE ORAL at 07:02

## 2020-01-01 RX ADMIN — SODIUM CHLORIDE, PRESERVATIVE FREE 10 ML: 5 INJECTION INTRAVENOUS at 22:57

## 2020-01-01 ASSESSMENT — ENCOUNTER SYMPTOMS
COUGH: 1
APNEA: 0
VOMITING: 0
CHEST TIGHTNESS: 0
EYE DISCHARGE: 0
NAUSEA: 0
SINUS PRESSURE: 0
BLOOD IN STOOL: 0
VOICE CHANGE: 0
VOICE CHANGE: 0
CONSTIPATION: 0
EYE ITCHING: 0
RHINORRHEA: 0
WHEEZING: 0
FACIAL SWELLING: 0
ANAL BLEEDING: 0
ANAL BLEEDING: 0
RHINORRHEA: 0
ANAL BLEEDING: 0
ABDOMINAL PAIN: 0
CHEST TIGHTNESS: 0
PHOTOPHOBIA: 0
ABDOMINAL PAIN: 0
EYE DISCHARGE: 0
ABDOMINAL DISTENTION: 0
WHEEZING: 0
PHOTOPHOBIA: 0
DIARRHEA: 0
CHEST TIGHTNESS: 0
CONSTIPATION: 0
ABDOMINAL PAIN: 1
EYE REDNESS: 0
SORE THROAT: 0
TROUBLE SWALLOWING: 0
FACIAL SWELLING: 0
EYE PAIN: 0
COLOR CHANGE: 0
EYE REDNESS: 0
CHOKING: 0
CONSTIPATION: 0
APNEA: 0
ABDOMINAL DISTENTION: 0
BLOOD IN STOOL: 0
EYE REDNESS: 0
RECTAL PAIN: 0
DIARRHEA: 0
SHORTNESS OF BREATH: 0
APNEA: 0
RHINORRHEA: 0
COUGH: 0
STRIDOR: 0
VOMITING: 0
SHORTNESS OF BREATH: 0
ABDOMINAL DISTENTION: 0
VOMITING: 0
RECTAL PAIN: 0
PHOTOPHOBIA: 0
EYE ITCHING: 0
SORE THROAT: 0
EYE ITCHING: 0
SORE THROAT: 0
NAUSEA: 0
COLOR CHANGE: 0
WHEEZING: 0
EYE DISCHARGE: 0
DIARRHEA: 0
RECTAL PAIN: 0
WHEEZING: 0
DIARRHEA: 0
ABDOMINAL DISTENTION: 0
EYE PAIN: 0
CHOKING: 0
VOMITING: 0
CHEST TIGHTNESS: 0
DIARRHEA: 0
VOICE CHANGE: 0
TROUBLE SWALLOWING: 0
ABDOMINAL PAIN: 0
VOMITING: 0
VOMITING: 0
STRIDOR: 0
SHORTNESS OF BREATH: 0
COUGH: 0
EYE ITCHING: 0
CHEST TIGHTNESS: 0
CHOKING: 0
COLOR CHANGE: 0
NAUSEA: 0
COUGH: 0
BLOOD IN STOOL: 0
EYE REDNESS: 0
FACIAL SWELLING: 0
CONSTIPATION: 0
SHORTNESS OF BREATH: 1
BLOOD IN STOOL: 0
CHOKING: 0
RECTAL PAIN: 1
ABDOMINAL PAIN: 0
SHORTNESS OF BREATH: 0
TROUBLE SWALLOWING: 0
STRIDOR: 0
COUGH: 1
STRIDOR: 0
EYE PAIN: 0
ABDOMINAL PAIN: 1
NAUSEA: 0
NAUSEA: 0
EYE DISCHARGE: 0
NAUSEA: 0
EYE PAIN: 0
COLOR CHANGE: 0
PHOTOPHOBIA: 0

## 2020-01-01 ASSESSMENT — PAIN SCALES - GENERAL
PAINLEVEL_OUTOF10: 3
PAINLEVEL_OUTOF10: 3
PAINLEVEL_OUTOF10: 0
PAINLEVEL_OUTOF10: 6
PAINLEVEL_OUTOF10: 0
PAINLEVEL_OUTOF10: 10
PAINLEVEL_OUTOF10: 0
PAINLEVEL_OUTOF10: 10
PAINLEVEL_OUTOF10: 0
PAINLEVEL_OUTOF10: 10
PAINLEVEL_OUTOF10: 0
PAINLEVEL_OUTOF10: 0

## 2020-01-01 ASSESSMENT — PAIN DESCRIPTION - LOCATION
LOCATION: KNEE
LOCATION: BACK;FLANK

## 2020-01-01 ASSESSMENT — PATIENT HEALTH QUESTIONNAIRE - PHQ9
2. FEELING DOWN, DEPRESSED OR HOPELESS: 1
SUM OF ALL RESPONSES TO PHQ QUESTIONS 1-9: 1
SUM OF ALL RESPONSES TO PHQ QUESTIONS 1-9: 1
1. LITTLE INTEREST OR PLEASURE IN DOING THINGS: 0
SUM OF ALL RESPONSES TO PHQ9 QUESTIONS 1 & 2: 1

## 2020-01-01 ASSESSMENT — PAIN DESCRIPTION - PAIN TYPE: TYPE: OTHER (COMMENT)

## 2020-01-10 NOTE — TELEPHONE ENCOUNTER
See previous message as well as this one - Left message for daughter- advised that Rocky Santacruz was not in office today and would not be on her computer until this evening to place the order for urinalysis, if she felt that was the correct step to take. Advised daughter to take Mom to ER for evaluation if she felt that it was emergent.
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
## 2020-02-02 NOTE — ED NOTES
Blood cultures obtained from [facility], per policy. Set one of two drawn at this time.              Derek Chan RN  02/02/20 6030

## 2020-02-02 NOTE — ED NOTES
2 large [facility] blood bank tubes sent down to lab for blood type verification due to antibodies. Unable to get second blood culture bottle on second set of cultures. Barbara Beckham NP made aware.      Sandee Olson RN  02/02/20 5477

## 2020-02-02 NOTE — ED NOTES
[address] has been in place since [phone]. Has zero output, patient states she feels like she has to go. Velez placed, sample obtained. Output 500cc into bag after placement, yellow clear urine, patient states feels some relief.      Marisa Myers RN  02/02/20 8581
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
## 2020-02-02 NOTE — ED NOTES
Informed consent for blood transfusion signed by daughter POA, placed in soft chart.      Jaydon Verma RN  02/02/20 0362

## 2020-02-03 NOTE — PROGRESS NOTES
Spoke to Dr. Michi Higgins in person. To wait and see what the H and H is at 0330 before transfusing the other 2 units of blood.

## 2020-02-03 NOTE — PROGRESS NOTES
Hospital Medicine Progress Note      Date of Admission: 2/2/2020  Hospital day # 1    Course: Follow up on GIB    Subjective    Patient refers no episodes of melena  Patient refers no abdominal pain    Exam:    /61   Pulse 75   Temp 97.6 °F (36.4 °C) (Oral)   Resp 18   Ht 4' 7\" (1.397 m)   Wt 115 lb (52.2 kg)   SpO2 99%   BMI 26.73 kg/m²     General appearance: No apparent distress, appears stated age and cooperative. HEENT: Pupils equal, round, and reactive to light. Conjunctivae/corneas clear. Neck: Supple. No jugular venous distention. Trachea midline. Respiratory:  Normal respiratory effort. Clear to auscultation, bilaterally without Rales/Wheezes/Rhonchi. Cardiovascular: Regular rate and rhythm with normal S1/S2 without murmurs, rubs or gallops. Abdomen: Soft, non-tender, non-distended with normal bowel sounds. Musculoskeletal: No clubbing, cyanosis or edema bilaterally. Brisk capillary refill. 2+ lower extremity pulses (dorsalis pedis).    Skin:  No rashes    Neurologic: awake, alert and following commands     Medications:  Reviewed    Infusion Medications    sodium chloride 75 mL/hr at 02/02/20 2031     Scheduled Medications    pantoprazole  40 mg Oral BID AC    sodium chloride flush  10 mL Intravenous 2 times per day    sodium chloride  20 mL Intravenous Once    lisinopril  10 mg Oral Daily    sucralfate  1 g Oral 4x Daily    calcium carbonate  500 mg Oral Daily    cefTRIAXone (ROCEPHIN) IV  1 g Intravenous Q24H     PRN Meds: sodium chloride flush, sodium chloride flush, magnesium hydroxide, ondansetron    I/O    Intake/Output Summary (Last 24 hours) at 2/3/2020 1105  Last data filed at 2/3/2020 1055  Gross per 24 hour   Intake 2169.5 ml   Output 1600 ml   Net 569.5 ml       Labs:   Recent Labs     02/02/20  1308 02/02/20  2118 02/03/20  0656   WBC 8.2  --   --    HGB 6.0* 6.1* 9.1*   HCT 20.2* 20.5* 29.8*     --   --        Recent Labs     02/02/20  1308 02/03/20  0656

## 2020-02-03 NOTE — PROGRESS NOTES
[facility]  PROGRESS NOTE  ATTENDING NOTE    H/o Dementia; very confused today, more than what I have seen her prior. Complaining about ayoub catheter. BP (!) 151/68   Pulse 70   Temp 97 °F (36.1 °C) (Temporal)   Resp 16   Ht 4' 7\" (1.397 m)   Wt 115 lb (52.2 kg)   SpO2 97%   BMI 26.73 kg/m²   Physical Exam  Constitutional:       Comments: Thin and frail   HENT:      Head: Normocephalic and atraumatic. Nose: Nose normal.      Mouth/Throat:      Mouth: Mucous membranes are moist.      Pharynx: Oropharynx is clear. Eyes:      Extraocular Movements: Extraocular movements intact. Pupils: Pupils are equal, round, and reactive to light. Neck:      Musculoskeletal: Normal range of motion and neck supple. Cardiovascular:      Rate and Rhythm: Normal rate. Pulses: Normal pulses. Pulmonary:      Effort: Pulmonary effort is normal.   Abdominal:      General: There is no distension. Palpations: Abdomen is soft. Tenderness: There is no abdominal tenderness. Musculoskeletal:         General: No swelling or tenderness. Skin:     General: Skin is warm and dry. Neurological:      Mental Status: She is alert. She is disoriented.      ASSESSMENT/PLAN:  GI bleed, acute blood loss anemia with h/o gastric ulcer  --carafate  --change PPI gtt to PPI BID  --monitor H/H  --ok for diet  --no plans for EGD at this time    Jannet Kocher, MD, MSc, FACS  2/3/2020  2:41 PM
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
## 2020-02-03 NOTE — PROGRESS NOTES
Spoke to Dr. Ash Chaves on the phone. To put in a stat H&H for the pt. To monitor her until her blood comes up. Pt can have clear liquids till midnight. Will update Dr. Ash Chaves with the H&H results when they come back. Messaged Shorty Lincoln about Benadryl for the pt. Pt stated she needs it to sleep.

## 2020-02-03 NOTE — CONSULTS
regular rhythm. ABDOMEN:  Soft, non-distended, diffusely mildly-tender. No guarding / rigidity / rebound. LIMBS:  No deformities, no edema. Tender diffusely. NEURO:  Face symmetric, moves all extremities  SKIN:  Warm, dry. Labs    CBC  Recent Labs     20  1308   WBC 8.2   HGB 6.0*   HCT 20.2*        BMP  Recent Labs     20  1308      K 5.0      CO2 24   BUN 33*   CREATININE 1.3*   CALCIUM 9.6     Liver Function  Recent Labs     20  1308   BILITOT <0.2   AST 17   ALT 8   ALKPHOS 51   PROT 5.5*   LABALBU 3.4*     No results for input(s): LACTATE in the last 72 hours. No results for input(s): INR, PTT in the last 72 hours. Invalid input(s): PT    Imaging/Diagnostics Last 24 Hours   Xr Chest Standard (2 Vw)    Result Date: 2020  Patient MRN: [id number] : 1930 Age:  80 years Gender: Female Order Date: 2020 12:15 PM Exam: XR CHEST (2 VW) Number of Images: 1 view Indication:   chest pain chest pain Comparison: Chest one view radiograph 2019 Findings: The heart is unremarkable. Diffuse airspace opacities are noted throughout the lungs bilaterally. No focal consolidation is identified. The aorta is unremarkable. There is evidence of multiple prior kyphoplasty is in the thoracolumbar spine. Diffuse airspace opacities are noted throughout the lungs bilaterally. No focal consolidation is identified.        Assessment      Hospital Problems           Last Modified POA    * (Principal) Acute blood loss anemia 2020 Yes    Hypertension 2020 Yes    GIB (gastrointestinal bleeding) 2020 Yes    PUD (peptic ulcer disease) 2020 Yes          80 y.o. female with likely upper GIB from prior duodenal ulcers, triggered by NSAID use    Plan   - continue ppi/carafate, prn transfusions per primary  - will monitor hemoglobin and clinical signs of active bleeding  - CLD, then NPO at midnight  - if appears to be actively bleeding, will perform EGD    Plan was

## 2020-02-04 PROBLEM — E44.0 MODERATE PROTEIN-CALORIE MALNUTRITION (HCC): Chronic | Status: ACTIVE | Noted: 2020-01-01

## 2020-02-04 NOTE — PROGRESS NOTES
Hospital Medicine Progress Note      Date of Admission: 2/2/2020  Hospital day # 2    Course: Follow up on GIB    Subjective    Patient refers no episodes of melena  Patient refers no abdominal pain    Exam:    BP (!) 169/87   Pulse 77   Temp 97.2 °F (36.2 °C) (Temporal)   Resp 16   Ht 4' 7\" (1.397 m)   Wt 117 lb 9.6 oz (53.3 kg)   SpO2 98%   BMI 27.33 kg/m²     General appearance: No apparent distress, appears stated age and cooperative. HEENT: Pupils equal, round, and reactive to light. Conjunctivae/corneas clear. Neck: Supple. No jugular venous distention. Trachea midline. Respiratory:  Normal respiratory effort. Clear to auscultation, bilaterally without Rales/Wheezes/Rhonchi. Cardiovascular: Regular rate and rhythm with normal S1/S2 without murmurs, rubs or gallops. Abdomen: Soft, non-tender, non-distended with normal bowel sounds. Musculoskeletal: No clubbing, cyanosis or edema bilaterally. Brisk capillary refill. 2+ lower extremity pulses (dorsalis pedis).    Skin:  No rashes    Neurologic: awake, alert and following commands     Medications:  Reviewed    Infusion Medications    sodium chloride 75 mL/hr at 02/03/20 1209     Scheduled Medications    pantoprazole  40 mg Intravenous BID    sodium chloride flush  10 mL Intravenous 2 times per day    sodium chloride  20 mL Intravenous Once    lisinopril  10 mg Oral Daily    sucralfate  1 g Oral 4x Daily    calcium carbonate  500 mg Oral Daily    cefTRIAXone (ROCEPHIN) IV  1 g Intravenous Q24H     PRN Meds: sodium chloride flush, sodium chloride flush, magnesium hydroxide, ondansetron    I/O    Intake/Output Summary (Last 24 hours) at 2/4/2020 0845  Last data filed at 2/4/2020 4511  Gross per 24 hour   Intake 320 ml   Output 2450 ml   Net -2130 ml       Labs:   Recent Labs     02/03/20  1029 02/03/20  1626 02/04/20  0021   WBC 6.3 8.4 8.5   HGB 9.4* 10.2* 9.3*   HCT 29.5* 33.1* 29.8*    233 207       Recent Labs     02/02/20  1308

## 2020-02-04 NOTE — PROGRESS NOTES
Nutrition Assessment    Type and Reason for Visit: Initial, Positive Nutrition Screen    Nutrition Recommendations: Advance diet when medically appropriate. Start appropriate ONS TID when diet advances. Nutrition Assessment: Currently NPO ; pt states hx of some chewing difficulty ; pt states that she is not a big eater;  Pt meets criteria moderate malnutrition on admit AEB poor po intake >1 month and muscle/fat wasting ; Will start appropriate nutritional supplementation     Malnutrition Assessment:  · Malnutrition Status: Meets the criteria for moderate malnutrition  · Context: Chronic illness  · Findings of the 6 clinical characteristics of malnutrition (Minimum of 2 out of 6 clinical characteristics is required to make the diagnosis of moderate or severe Protein Calorie Malnutrition based on AND/ASPEN Guidelines):  1. Energy Intake-Less than or equal to 75% of estimated energy requirement, Greater than or equal to 1 month    2. Weight Loss-No significant weight loss,    3. Fat Loss-Mild subcutaneous fat loss, Orbital  4. Muscle Loss-Moderate muscle mass loss, Temples (temporalis muscle), Clavicles (pectoralis and deltoids), Interosseous  5. Fluid Accumulation-No significant fluid accumulation,    6.  Strength-Not measured    Nutrition Risk Level: Moderate    Nutrient Needs:  · Estimated Daily Total Kcal: 950-1050( x 1.2)  · Estimated Daily Protein (g): 45-55(1.3-1.5)  · Estimated Daily Total Fluid (ml/day): 900-1000    Nutrition Diagnosis:   · Problem:  Moderate malnutrition, In context of chronic illness  · Etiology: related to Cognitive or neurological impairment     Signs and symptoms:  as evidenced by Moderate muscle loss, Intake 50-75%, Diet history of poor intake, Mild loss of subcutaneous fat    Objective Information:  · Nutrition-Focused Physical Findings: Forgetful at times, -I/O's (-1.3 L), active BS, U/L dentures, mild muscle and fat wasting, pt. stated dentures make it difficult to chew at times, pale/dry skin, boggy heels      · Wound Type: None(redness to buttocks )     · Current Nutrition Therapies:  · Oral Diet Orders: NPO   · Oral Diet intake: NPO(currently NPO ; was averaging ~75% of meals served prior ; pt states decreased appetite x 1 month PTA)  · Oral Nutrition Supplement (ONS) Orders: None  · ONS intake: NPO     · Anthropometric Measures:  · Ht: 4' 7\" (139.7 cm)   · Current Body Wt: 114 lb (51.7 kg)(2/4, bed scale per RD)  · Admission Body Wt: 115 lb (52.2 kg)(2/3, bed scale first taken )  · Usual Body Wt: 109 lb (49.4 kg)(3/31/2019, bed scale )  ·   Per EMR UBW 109lbs, bed scale on 3/31/2019, no sign wt. loss   · Ideal Body Wt: 75 lb (34 kg), % Ideal Body 152%  · BMI Classification: BMI 25.0 - 29.9 Overweight    Nutrition Interventions:   Continue NPO, Start ONS  Coordination of Care, Continued Inpatient Monitoring, Education not appropriate at this time    Nutrition Evaluation:   · Evaluation: Goals set   · Goals: >75% meals and ONS     · Monitoring: Meal Intake, Weight, Pertinent Labs, Chewing/Swallowing, Mental Status/Confusion, Monitor Bowel Function, I&O, Skin Integrity, Diet Tolerance, Supplement Intake, Nutrition Progression, Monitor Hemodynamic Status      Electronically signed by Ulysses Flirt, RD, LD on 2/4/20 at 2:24 PM    Contact Number: [phone]

## 2020-02-04 NOTE — CONSULTS
normal.  Nose/Sinuses: Nares normal. Septum midline. Mucosa normal. No sinus tenderness. Oropharynx: Oropharynx clear with no exudates seen  Neck: Neck supple. No jugular venous distension, lymphadenopathy or thyromegaly Trachea midline  Lungs: Lungs clear to auscultation bilaterally. No rhonchi, crackles or wheezes  Heart: S1 S2  Regular rate and rhythm. No rub, murmur or gallop  Abdomen: Abdomen soft, diffusely tender  Extremities: No edema, Peripheral pulses palpable  Musculoskeletal: Muscular strength appears intact. No joint effusion, tenderness, swelling or warmth      DATA:    Labs:     Last 3 CBC:  Recent Labs     02/03/20  1029 02/03/20  1626 02/04/20  0021   WBC 6.3 8.4 8.5   RBC 3.35* 3.67 3.35*   HGB 9.4* 10.2* 9.3*    233 207   MPV 9.4 9.2 9.0       Last 3 BMP  Recent Labs     02/02/20  1308 02/03/20  0656    139   K 5.0 4.5    110*   CO2 24 17*   BUN 33* 22   CREATININE 1.3* 1.3*   GLUCOSE 96 95   CALCIUM 9.6 8.7       LIVER PROFILE:  Recent Labs     02/02/20  1308   AST 17   ALT 8   LABALBU 3.4*       URINARY CATHETER OUTPUT (Velez):  [REMOVED] Urethral Catheter Non-latex-Output (mL): 800 mL    DRAIN/TUBE OUTPUT:     Microbiology :  Recent Labs     02/02/20  1308   BC 24 Hours- no growth     Recent Labs     02/02/20  1545   BLOODCULT2 Gram stain performed from blood culture bottle media  Gram positive cocci  Evaluating for strep species  *     Recent Labs     02/02/20  [address] not present, incubation continues     No results for input(s): CULTRESP in the last 72 hours. No results for input(s): WNDABS in the last 72 hours. Radiology :  XR CHEST STANDARD (2 VW)   Final Result      Diffuse airspace opacities are noted throughout the lungs bilaterally. No focal consolidation is identified.                              Assessment and Plan:      Diverticulosis  Anemia/questionable GI bleed  Strep/enterococcus bactremia  Started on ceftriaxone yesterday  abdo still tender- CT abdo/pelvis wo contrast ordered (cannot give contrast for GFR<40)  Will follow     Add one dose vancomycin pending species identification of strep sps                  Kaylynn Villalobos MD

## 2020-02-04 NOTE — FLOWSHEET NOTE
Patient stated that she has POA and LW. Stated that daughter Roxie Tsai is her agent. Encouraged to bring copy for chart. 02/04/20 1330   Encounter Summary   Services provided to: Patient and family together   Referral/Consult From: [facility];Family members   Continue Visiting   (0685)   Complexity of Encounter Moderate   Spiritual Assessment Completed Yes   Advance Care Planning Yes   Routine   Type Initial   Spiritual/Faith   Type Spiritual support   Assessment Calm; Approachable   Intervention Active listening;Explored feelings, thoughts, concerns;Nurtured hope;Prayer;Provided reading materials/devotional materials; Discussed death;Discussed relationship with God;Discussed belief system/Confucianism practices/alesia;Discussed illness/injury and it's impact   Outcome Comfort;Expressed gratitude;Engaged in conversation;Expressed feelings/needs/concerns;Encouraged;Receptive   Advance Directives (For Healthcare)   Healthcare Directive Yes, patient has an advance directive for healthcare treatment   Type of Healthcare Directive Durable power of  for health care;Living will   Copy in Chart No, copy requested from family   [facility] Agent's Name Yessi Haro

## 2020-02-05 NOTE — DISCHARGE SUMMARY
auscultation, bilaterally without Rales/Wheezes/Rhonchi with good respiratory effort. Heart: Regular rate and rhythm with Normal S1/S2 without murmurs, rubs or gallops, point of maximum impulse non-displaced  Abdomen: Soft, non-tender or non-distended without rigidity or guarding and positive bowel sounds all four quadrants. Extremities: No clubbing, cyanosis, or edema bilaterally. Skin: Skin color, texture, turgor normal.  No rashes or lesions. Neurologic: Alert and oriented X 3, neurovascularly intact with sensory/motor intact upper extremities/lower extremities, bilaterally. Cranial nerves: II-XII intact, grossly non-focal.  Mental status: Alert, oriented, thought content appropriate. Consults:   IP CONSULT TO PRIMARY CARE PROVIDER  IP CONSULT TO GENERAL SURGERY  IP CONSULT TO INFECTIOUS DISEASES    Discharge Instructions / Follow up: Follow-up PCP  Follow-up with infectious diseases  Follow-up with surgery/GI        Activity: activity as tolerated    Significant labs:    Labs: For convenience and continuity at follow-up the following most recent labs are provided:  CBC:   Recent Labs     02/04/20  0021 02/04/20  1552 02/05/20  0659   WBC 8.5 7.9 6.3   RBC 3.35* 3.83 3.34*   HGB 9.3* 10.5* 9.0*   HCT 29.8* 34.2 29.5*   MCV 89.0 89.3 88.3   RDW 14.2 14.2 14.2    234 212     BMP:   Recent Labs     02/03/20  0656      K 4.5   *   CO2 17*   BUN 22   CREATININE 1.3*     LFT:  No results for input(s): PROT, ALB, ALKPHOS, ALT, AST, BILITOT, AMYLASE, LIPASE in the last 72 hours. PT/INR: No results for input(s): INR, APTT in the last 72 hours. BNP: No results for input(s): BNP in the last 72 hours.   Hgb A1C:   Lab Results   Component Value Date    LABA1C 6.1 (H) 08/30/2017     Folate and B12:   Lab Results   Component Value Date    OXJAANYK28 >2000 (H) 08/31/2017   ,   Lab Results   Component Value Date    FOLATE 20.0 08/31/2017     Thyroid Studies:   Lab Results   Component Value Date 2/2/2020 12:15 PM Exam: XR CHEST (2 VW) Number of Images: 1 view Indication:   chest pain chest pain Comparison: Chest one view radiograph 5/23/2019 Findings: The heart is unremarkable. Diffuse airspace opacities are noted throughout the lungs bilaterally. No focal consolidation is identified. The aorta is unremarkable. There is evidence of multiple prior kyphoplasty is in the thoracolumbar spine. Diffuse airspace opacities are noted throughout the lungs bilaterally. No focal consolidation is identified. Discharge Medications:      Medication List      START taking these medications    pantoprazole 40 MG tablet  Commonly known as:  PROTONIX  Take 1 tablet by mouth every morning (before breakfast)        CONTINUE taking these medications    alendronate 70 MG tablet  Commonly known as:  FOSAMAX  Take 1 tablet by mouth every 7 days     lisinopril 10 MG tablet  Commonly known as:  PRINIVIL;ZESTRIL  Take 1 tablet by mouth daily     sucralfate 1 GM tablet  Commonly known as:  CARAFATE  Take 1 tablet by mouth 4 times daily           Where to Get Your Medications      These medications were sent to [address] [place] [id number]    Phone:  [phone]   · pantoprazole 40 MG tablet           Condition at discharge: Stable     [facility]    Thank you for the opportunity to be involved in this patient's care. If you have any questions or concerns please feel free to contact me. Time Spent on discharge is more than 35 minutes in the examination, evaluation, counseling and review of medications and discharge plan.    +++++++++++++++++++++++++++++++++++++++++++++++++  Danika Patel MD, Hospitalist  +++++++++++++++++++++++++++++++++++++++++++++++++  NOTE: This report was transcribed using voice recognition software.  Every effort was made to ensure accuracy; however,

## 2020-02-05 NOTE — DISCHARGE INSTR - COC
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 06/28/18 Julianna Daigle RN   05/28/19 Julianna Daigle RN    Proteus mirabilis Urine 6/25/2018,6/28/2018          Nurse Assessment:  Last Vital Signs: /70   Pulse 85   Temp 98.2 °F (36.8 °C) (Oral)   Resp 16   Ht 4' 7\" (1.397 m)   Wt 112 lb 9.6 oz (51.1 kg)   SpO2 98%   BMI 26.17 kg/m²     Last documented pain score (0-10 scale): Pain Level: 0  Last Weight:   Wt Readings from Last 1 Encounters:   02/05/20 112 lb 9.6 oz (51.1 kg)     Mental Status:  oriented    IV Access:  - None    Nursing Mobility/ADLs:  Walking   Assisted  Transfer  Assisted  Bathing  Assisted  Dressing  Assisted  Toileting  Assisted  Feeding  Independent  Med Admin  Independent  Med Delivery   whole    Wound Care Documentation and Therapy:  Wound 08/31/17 Back Mid (Active)   Number of days: 888       Incision 12/20/17 Back Mid;Lower (Active)   Number of days: 343       Wound 03/29/19 Leg Left;Upper;Posterior just below left buutock (Active)   Number of days: 313        Elimination:  Continence:   · Bowel: Yes  · Bladder: Yes  Urinary Catheter: None   Colostomy/Ileostomy/Ileal Conduit: No       Date of Last BM: 02/05/2020    Intake/Output Summary (Last 24 hours) at 2/5/2020 1630  Last data filed at 2/5/2020 1000  Gross per 24 hour   Intake 120 ml   Output 2300 ml   Net -2180 ml     I/O last 3 completed shifts: In: 120 [P.O.:120]  Out: 2600 [Urine:2600]    Safety Concerns:     None    Impairments/Disabilities:      None    Nutrition Therapy:  Current Nutrition Therapy:   - Oral Diet:  General    Routes of Feeding: Oral  Liquids:  Thin Liquids  Daily Fluid Restriction: no  Last Modified Barium Swallow with Video (Video Swallowing Test): not done    Treatments at the Time of Hospital Discharge:   Respiratory Treatments: ***  Oxygen Therapy:  {Therapy; copd oxygen:79011}  Ventilator:    { CC Vent RMMS:652723897}    Rehab Therapies: {THERAPEUTIC INTERVENTION:3070679681}  Weight Bearing Status/Restrictions: { CC Weight Bearin}  Other Medical Equipment (for information only, NOT a DME order):  {EQUIPMENT:972755880}  Other Treatments: ***    Patient's personal belongings (please select all that are sent with patient):  {[facility] DME Belongings:232772527}    RN SIGNATURE:  Electronically signed by Elder Philip RN on 20 at 4:31 PM    CASE MANAGEMENT/SOCIAL WORK SECTION    Inpatient Status Date: ***    Readmission Risk Assessment Score:  Readmission Risk              Risk of Unplanned Readmission:        14           Discharging to Facility/ Agency   · Name:   · Address:  · Phone:  · Fax:    Dialysis Facility (if applicable)   · Name:  · Address:  · Dialysis Schedule:  · Phone:  · Fax:    / signature: {Esignature:090766120}    PHYSICIAN SECTION    Prognosis: {Prognosis:8789475368}    Condition at Discharge: Rafael Boone Elian Patient Condition:125687978}    Rehab Potential (if transferring to Rehab): {Prognosis:1411100960}    Recommended Labs or Other Treatments After Discharge: ***    Physician Certification: I certify the above information and transfer of Karen Bustos  is necessary for the continuing treatment of the diagnosis listed and that she requires {Admit to Appropriate Level of Care:43782} for {GREATER/LESS:779473195} 30 days.      Update Admission H&P: {P DME Changes in ZGGIR:253351652}    PHYSICIAN SIGNATURE:  {Esignature:628067486}

## 2020-02-05 NOTE — CARE COORDINATION
[facility] following, will need HHC orders CPA, Med compliance, and PT/OT. Plan is home with [facility]. Amna TORO
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
## 2020-02-05 NOTE — PLAN OF CARE
Problem: Falls - Risk of:  Goal: Will remain free from falls  Description  Will remain free from falls  Outcome: Met This Shift  Goal: Absence of physical injury  Description  Absence of physical injury  Outcome: Met This Shift     Problem: Risk for Impaired Skin Integrity  Goal: Tissue integrity - skin and mucous membranes  Description  Structural intactness and normal physiological function of skin and  mucous membranes.   Outcome: Met This Shift     Problem: Bleeding:  Goal: Will show no signs and symptoms of excessive bleeding  Description  Will show no signs and symptoms of excessive bleeding  Outcome: Met This Shift     Problem: Breathing Pattern - Ineffective:  Goal: Ability to achieve and maintain a regular respiratory rate will improve  Description  Ability to achieve and maintain a regular respiratory rate will improve  Outcome: Met This Shift

## 2020-02-05 NOTE — PROGRESS NOTES
supine: sup   Functional Transfers Sit to stand:Min A   Stand to sit:Min A  Stand pivot: Min A (chair-bed)  Commode: TBA  sit<>stand MIN A from EOB  V/c's for hand placement and safety  SUP   Functional Mobility NT N/t  SUP   Balance Sitting:     Static:  SUP    Dynamic:SUP  Standing: Min A Sitting:   Static: supervision   Dynamic: supervision   Standing: MIN A with HHA      Activity Tolerance fair Fair-      Visual/  Perceptual Glasses: yes                        Comments: Upon arrival pt supine in bed with daughter present. Pt educated with regards to bed mobility, functional transfers, LE/UE dressing, static/dynamic sitting balance, hand placement, bathroom safety, DME, bathing AE, grooming tasks. At end of session in bed with all lines and tubes intact, call light within reach and Dtr. Present. · Pt has made fair- progress towards set goals.    · Continue with current plan of care        Treatment Time XI:2366            Treatment Time Out: 5533                Treatment Charges: Mins Units   Ther Ex  63297     Manual Therapy [id number]     Thera Activities [id number] 15 1    ADL/Home Mgt [id number] 20 1   Neuro Re-ed [id number]     Group Therapy      Orthotic manage/training  [id number]     Non-Billable Time     Total Timed Treatment 35 [address]  MAHER/L 30594

## 2020-02-06 NOTE — CARE COORDINATION
Heather 45 Transitions Initial Follow Up Call    Call within 2 business days of discharge: Yes    Patient: Hal Angel Patient : 1930   MRN: [id number]  Reason for Admission: Acute Blood Loss  Discharge Date: 20 RARS: Readmission Risk Score: 14      Last Discharge [address]       Complaint Diagnosis Description Type Department Provider    20 Fatigue Acute blood loss anemia . .. ED to Hosp-Admission (Discharged) (ADMITTED) SEYZ 6WE [facility], DO; Jodelmckenzie Solo Egg. .. Spoke with [employer] for initial care transition call post hospital discharge. Identified myself RN MIGEL with [address] [facility] [address] calling for Care Transition call to see how patient is feeling since discharged from the hospital, review allergies, medications patient is taking, see if there is anything patient needs. Discussed with her my role as CTN and the Care Transition Program.  Daughter verbalized understanding and is agreeable to continue follow up calls. Spoke with:  Called to speak with patient. Patient's daughter, Joselynabdelrahman Wayne states patient has dementia. Joselynabdelrahman Wayne is listed on patient's Communication Release of Information Form and states she will be able to answer any questions. Facility:[facility]    Non-face-to-face services provided:    Obtained and reviewed discharge summary and/or continuity of care documentsDaughter states prior to discharge from the hospital patient received discharge instructions which were reviewed with daughter at that time. All questions were answered at that time. Daughter states patient went to the hospital for fatigue, weakness, dark tarry BM, and upper abdominal pain. She states patient received blood while in the hospital for low hgb. She states she was also informed patient had an infection and was informed at discharged was patient did not need any medications for this.        RN CTN noted in the EMR Dr. Meka Irving note on 20  Copied from the note: Plan  -Repeat blood cultures before giving her antibiotics have been negative so far  -CT abdomen pelvis noted  -Got one dose vancomycin pending species identification of strep sps, however she never had any fever, chills, no hardware in place, no urinary symptoms or suspicion for UTI. Ceftriaxone is really not even a treatment for Aerococcus and at this time I will call it as a contamination and follow-up outpatient with infectious disease in 1 to 2 weeks    Reported symptoms as noted below:  -fatigue and weakness daughter states is improving;  Daughter unable to answer address other symptoms as she has not been out to visit patient. Care Transitions 24 Hour Call    Do you have any ongoing symptoms?:  Yes  Patient-reported symptoms:  Fatigue (Comment: Improving)  Interventions for patient-reported symptoms:  Notified PCP/Physician (Comment: Routed to PCP)  Do you have a copy of your discharge instructions?:  Yes  Do you have all of your prescriptions and are they filled?:  No (Comment: Refer to note)  Have you scheduled your follow up appointment?:  Yes (Comment: 2/11/20)  How are you going to get to your appointment?:  Car - family or friend to transport  Were you discharged with any Home Care or Post Acute Services:  Yes  Post Acute Services:  Home Health (Comment: [facility] -daughter states she is calling them today to set up the first visit)  Do you feel like you have everything you need to keep you well at home?:  Yes  Care Transitions Interventions                               Allergies and Medications reviewed with patient's daughter completed, 1111F  and entered. AVS Medications:   New Medications:  -Protonix 40 mg- NOT TAKING- daughter states pharmacy delivers patient's medication and will be delivering the medication tomorrow, 2/7/20.      Medication patient taking NOT LISTED on the AVS:  -Loratadine 10 mg 1 tablet in the AM and 1 tablet at [facility]      Daughter denies any needs, questions, or concerns at this

## 2020-02-10 NOTE — TELEPHONE ENCOUNTER
Flex Ferrer with [facility] called she would like Home Health Aid and Speech Therapy ordered for the pt, thanks, if you like to reach Flex Ferrer you can at [phone]

## 2020-02-11 NOTE — PROGRESS NOTES
Post-Discharge Transitional Care Management Services or Hospital Follow Up      Anastacio Tinoco   YOB: 1930    Date of Office Visit:  2/11/2020  Date of Hospital Admission: 2/2/20  Date of Hospital Discharge: 2/5/20  Risk of hospital readmission (high >=14%.  Medium >=10%) :Readmission Risk Score: 14      Care management risk score Rising risk (score 2-5) and Complex Care (Scores >=6): 9     Non face to face  following discharge, date last encounter closed (first attempt may have been earlier): 2/6/2020 12:14 PM    Call initiated 2 business days of discharge: Yes    Patient Active Problem List   Diagnosis    Hypertension    Osteoporosis    Primary osteoarthritis involving multiple joints    Closed subluxation of cervical spine    Compression fracture of thoracic spine, non-traumatic (HCC)    Kidney stone    Abdominal pain    Acute blood loss anemia    Acute renal failure superimposed on stage 3 chronic kidney disease ([facility])    Lactic acidosis    GIB (gastrointestinal bleeding)    PUD (peptic ulcer disease)    Moderate protein-calorie malnutrition (HCC)       Allergies   Allergen Reactions    Penicillins Swelling    Celebrex [Celecoxib] Nausea Only     Headache    Demerol Hcl [Meperidine] Nausea And Vomiting    Dust Mite Extract      Dust, grass, and trees    Percocet [Oxycodone-Acetaminophen] Nausea And Vomiting    Percodan [Oxycodone-Aspirin] Nausea And Vomiting    Propulsid [Cisapride] Other (See Comments)     Headache       Medications listed as ordered at the time of discharge from hospital Lenord Brittle Home Medication Instructions LIANNE:    Printed on:02/11/20 1726   Medication Information                      alendronate (FOSAMAX) 70 MG tablet  Take 1 tablet by mouth every 7 days             lisinopril (PRINIVIL;ZESTRIL) 10 MG tablet  Take 1 tablet by mouth daily             pantoprazole (PROTONIX) 40 MG tablet  Take 1 tablet by mouth every morning (before breakfast) sucralfate (CARAFATE) 1 GM tablet  Take 1 tablet by mouth 4 times daily                   Medications marked \"taking\" at this time  Outpatient Medications Marked as Taking for the 2/11/20 encounter (Office Visit) with KEVIN Cook - CNP   Medication Sig Dispense Refill    pantoprazole (PROTONIX) 40 MG tablet Take 1 tablet by mouth every morning (before breakfast) 30 tablet 0    alendronate (FOSAMAX) 70 MG tablet Take 1 tablet by mouth every 7 days 12 tablet 3    sucralfate (CARAFATE) 1 GM tablet Take 1 tablet by mouth 4 times daily 120 tablet 3    lisinopril (PRINIVIL;ZESTRIL) 10 MG tablet Take 1 tablet by mouth daily 90 tablet 1        Medications patient taking as of now reconciled against medications ordered at time of hospital discharge: Yes    Chief Complaint   Patient presents with   [address] Management     TCM    Referral - General     ST and Home Health Aid    Immunizations     Influenza/Pneumococcal 23       History of Present illness - Follow up of Hospital diagnosis(es): CAP, GI Bleed, Anemia, Physical Deconditioning, Malnutrition  Daughter present with pt today, requesting pain medication for chronic thoracic pain ( h/o compression fracture ) and need for Influenza an Pneumococcal 23 Vaccine    Inpatient course: Discharge summary reviewed- see chart. Interval history/Current status: Home/Stable    Review of Systems   Constitutional: Positive for activity change. Negative for appetite change, chills, diaphoresis, fatigue, fever and unexpected weight change. HENT: Negative for congestion, ear discharge, ear pain, facial swelling, mouth sores, nosebleeds, postnasal drip, rhinorrhea, sneezing, sore throat, trouble swallowing and voice change. Eyes: Negative for photophobia, pain, discharge, redness, itching and visual disturbance. Respiratory: Negative for apnea, cough, choking, chest tightness, shortness of breath, wheezing and stridor.          H/o CAP   Cardiovascular: Negative for chest pain, palpitations and leg swelling. Gastrointestinal: Negative for abdominal distention, abdominal pain, anal bleeding, blood in stool, constipation, diarrhea, nausea, rectal pain and vomiting. H/o GERD/GI Bleed  H/o Drug induced constipation-improved on colace   Endocrine: Negative for cold intolerance, heat intolerance, polydipsia, polyphagia and polyuria. Genitourinary: Negative for decreased urine volume, difficulty urinating, dysuria, enuresis, flank pain, frequency, hematuria and urgency. H/o recurrent UTI   Musculoskeletal:        H/o Osteoporosis/compression fracture thoracic spine/chronic thoracic pain   Skin: Negative for color change, pallor and rash. Neurological: Negative for dizziness, tremors, seizures, syncope, facial asymmetry, speech difficulty, light-headedness, numbness and headaches. H/o Dementia   Hematological:        H/o Anemia d/t Blood Loss   Psychiatric/Behavioral: Negative for dysphoric mood, hallucinations, self-injury, sleep disturbance and suicidal ideas. The patient is not nervous/anxious. H/o Insomnia       Vitals:    02/11/20 1453 02/11/20 1458   BP: (!) 158/84 (!) 145/84   Site: Right Upper Arm Right Upper Arm   Position: Sitting Sitting   Cuff Size: Medium Adult Medium Adult   Pulse: 87    Resp: 18    Temp: 96.6 °F (35.9 °C)    TempSrc: Temporal    SpO2: 98%    Weight: 108 lb 11.2 oz (49.3 kg)    Height: 4' 8\" (1.422 m)      Body mass index is 24.37 kg/m².    Wt Readings from Last 3 Encounters:   02/11/20 108 lb 11.2 oz (49.3 kg)   02/05/20 112 lb 9.6 oz (51.1 kg)   12/11/19 106 lb 3.2 oz (48.2 kg)     BP Readings from Last 3 Encounters:   02/11/20 (!) 145/84   02/05/20 120/70   12/11/19 133/72        Physical Exam:  General Appearance: alert and oriented to person, place and time, in no acute distress and frail-appearing  Skin: warm and dry, no rash or erythema  Head: normocephalic and atraumatic  Eyes: pupils equal, round, and reactive to light, extraocular eye movements intact, conjunctivae normal  ENT: tympanic membrane, external ear and ear canal normal bilaterally, oropharynx clear and moist with normal mucous membranes  Neck: neck supple and non tender without mass, no thyromegaly or thyroid nodules, no cervical lymphadenopathy   Pulmonary/Chest: clear to auscultation bilaterally- no wheezes, rales or rhonchi, normal air movement, no respiratory distress  Cardiovascular: normal rate, normal S1 and S2, no gallops, intact distal pulses and no carotid bruits  Abdomen: soft, non-tender, non-distended, normal bowel sounds, no masses or organomegaly  Extremities: no cyanosis and no clubbing  Musculoskeletal: Generalized tenderness present to joints of BUE/BLE, moderate pain present w/palpation to thoracic spine and bilateral paraspinal muscles, physical deconditioning, muscle atrophy, requires Rollator, Decreased ROM to BUE/BLE, slow in changing positions  Neurologic: Alert and Oriented x 3, intermittent short term memory loss    Assessment/Plan:  1. Community acquired bacterial pneumonia  Resolved  - ID DISCHARGE MEDS RECONCILED W/ CURRENT OUTPATIENT MED LIST  - External Referral To Speech Therapy  - External Referral To [address] [place] [address]    2. History of GI bleed  Resolved  - ID DISCHARGE MEDS RECONCILED W/ CURRENT OUTPATIENT MED LIST    3. Anemia due to blood loss  Resolved  - ID DISCHARGE MEDS RECONCILED W/ CURRENT OUTPATIENT MED LIST    4. Physical deconditioning  Referrals ordered  - ID DISCHARGE MEDS RECONCILED W/ CURRENT OUTPATIENT MED LIST  - External Referral To Speech Therapy  - External Referral To [address] [place] [address]    5. Moderate protein-calorie malnutrition ([facility] [address]  Unchanged  - ID DISCHARGE MEDS RECONCILED W/ CURRENT OUTPATIENT MED LIST  - External Referral To Speech Therapy  - External Referral To [address] [place] [address]    6.

## 2020-02-18 NOTE — CARE COORDINATION
Heather 45 Transitions Follow Up Call    2020    Patient: Bro Faustin  Patient : 1930   MRN: <[id number]>  Reason for Admission: Acute Blood Loss, GI Hemorrhage, Fatigue, PNA  Discharge Date: 20 RARS: Readmission Risk Score: 14    Follow Up: Attempted to contact patient for transition follow up. Unable to reach patient. Left message on voicemail with reason for call, contact information and request for call back.       Future Appointments   Date Time Provider Haley Peña   2020 10:30 AM KEVIN Sargent - CNP [facility]   3/3/2020  2:00 PM SCHEDULE, [address]       Colby Arce RN

## 2020-02-18 NOTE — CARE COORDINATION
Heather 45 Transitions Follow Up Call    2020    Patient: Fer Rocha  Patient : 1930   MRN: <[id number]>  Reason for Admission:   Discharge Date: 20 RARS: Readmission Risk Score: 14    Follow Up:  Received return phone call from patient's daughter Richard Brice. She states her mother is doing really good. She is receiving PT and OT. States she has a nurse who checks on her mother weekly. Has follow up appointments today. No questions or concerns at this time.       Future Appointments   Date Time Provider Haley Peña   3/3/2020  2:00 PM Thai, [address]       Idalia Chowdhury RN

## 2020-03-02 NOTE — TELEPHONE ENCOUNTER
Patients daughter is requesting 2 refills on her pantoprazole 40 mg , and hydrocodone please send to [facility] .     Contact # [phone]
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
## 2020-03-11 NOTE — TELEPHONE ENCOUNTER
Patients daughter called and is asking for a refill on her  Pantoprazole 40 mg and lisinopril 10 mg , please send to [facility].        Contact # [phone]

## 2020-03-12 NOTE — TELEPHONE ENCOUNTER
I can not put refills on [place] but did send to pharmacy Please call the patient regarding labs - TSH okay-continue levothyroxine at current dose

## 2020-03-23 NOTE — TELEPHONE ENCOUNTER
Call from daughter of Zuleyma Cluster requesting to cancel appointment 3/31 due to COVID-19 precaution. Will call back in April to reschedule.

## 2020-07-20 NOTE — ED NOTES
Incontinence care provided x2. Disposable brief applied for d/c ride home as patient incontinent of stool. Disposable pad provided for car seat.      Tracee Kurtz RN  07/20/20 8920

## 2020-07-20 NOTE — TELEPHONE ENCOUNTER
Call from Phil Lewis stating her mother is having more frequent falls. She is taking her to the ED. Phil Lewis states she is unable to get a hold of her PCP but feels something is not right .  Instructed that if her mother is admitted she can let them know she is a Palliative Medicine patient and we could follow her in the hospital.
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
## 2020-07-30 NOTE — PROGRESS NOTES
René Espinoza is a 80 y.o. female who presents today for   Chief Complaint   Patient presents with    Follow-Up from Hospital     f/u ED-hypotension/syncope    Other     physical deconditioning    Dizziness         HPI    Pt presents today for ED f/u appointment, pt was seen in the ED on 7/20/20-see ED course    Daughter is resent with pt, Denies any episodes of Syncope, pt's BP is now stable, she did have Orthostatic hypotension in the ED, Daughter is trying to force more fluids. CT Abd/Pelvis revealed fecal impaction, she does have a h/o constipation, she is compliant w/Miralax and is having BMs daily, denies hard or painful stools. Daughter is adding more fiber to her diet, however pt is picky w/foods      Pt does continue to c/o intermittent dizziness, denies room spinning or any other neuro-related issues        Daughter and pt have no other concerns        [address]:  Patient's past medical, surgical, social and/or family history reviewed, updated in chart, and are non-contributory (unless otherwise stated). Medications and allergies also reviewed and updated in chart. Review of Systems  Review of Systems   Constitutional: Positive for activity change. Negative for appetite change, chills, diaphoresis, fatigue, fever and unexpected weight change. HENT: Negative for congestion, ear discharge, ear pain, facial swelling, mouth sores, nosebleeds, postnasal drip, rhinorrhea, sneezing, sore throat, trouble swallowing and voice change. Bilateral Cerumen Impaction   Eyes: Negative for photophobia, pain, discharge, redness, itching and visual disturbance. Respiratory: Negative for apnea, cough, choking, chest tightness, shortness of breath, wheezing and stridor. Cardiovascular: Negative for chest pain, palpitations and leg swelling.    Gastrointestinal: Negative for abdominal distention, abdominal pain, anal bleeding, blood in stool, constipation, diarrhea, nausea, rectal pain and vomiting. H/o GERD-controlled  H/o Drug induced constipation/fecal impaction-improved on colace   Endocrine: Negative for cold intolerance, heat intolerance, polydipsia, polyphagia and polyuria. Genitourinary: Negative for decreased urine volume, difficulty urinating, dysuria, enuresis, flank pain, frequency, hematuria and urgency. H/o recurrent UTI   Musculoskeletal:        H/o Osteoporosis/compression fracture thoracic spine/chronic thoracic pain   Skin: Negative for color change, pallor and rash. Neurological: Negative for tremors, seizures, facial asymmetry, speech difficulty, light-headedness, numbness and headaches. H/o Syncope/Dizziness   Psychiatric/Behavioral: Negative for dysphoric mood. The patient is not nervous/anxious. H/o Insomnia-improved on Remeron       Physical Exam:    VS:  /67 (Site: Left Upper Arm, Position: Sitting, Cuff Size: Medium Adult)   Pulse 67   Temp 98.2 °F (36.8 °C) (Temporal)   Resp 18   Ht 4' 5\" (1.346 m)   Wt 104 lb (47.2 kg)   SpO2 99%   BMI 26.03 kg/m²   LAST WEIGHT:  Wt Readings from Last 3 Encounters:   07/30/20 104 lb (47.2 kg)   07/28/20 109 lb (49.4 kg)   03/03/20 105 lb (47.6 kg)     Physical Exam  Vitals signs and nursing note reviewed. Constitutional:       General: She is not in acute distress. Appearance: She is well-developed. She is not ill-appearing, toxic-appearing or diaphoretic. Comments: Elderly female   HENT:      Head: Normocephalic and atraumatic. Right Ear: Ear canal normal. There is impacted cerumen. Left Ear: Ear canal normal. There is impacted cerumen. Ears:      Comments: Append: unable to successfully remove cerumen via water irrigation/flush     Nose: Nose normal. No congestion or rhinorrhea. Mouth/Throat:      Pharynx: Oropharynx is clear. No oropharyngeal exudate or posterior oropharyngeal erythema. Eyes:      General:         Right eye: No discharge.          Left eye: No Judgment normal.               Assessment / Plan:      Debbie Mercado was seen today for follow-up from hospital, other and dizziness. Diagnoses and all orders for this visit:    Physical deconditioning  Continue w/Home Health    History of syncope-resolved    Fecal impaction (HCC)-resolved  Continue Miralax  Add fiber to diet    Orthostatic hypotension  Advised to change positions slowly  BP stable today  Stay hydrated-increase fluids  Advised to notify office with systolic < 102 and diastolic < 60      Essential hypertension-stable  Continue current treatment plan    Bilateral impacted cerumen  Unable to successfully remove cerumen, Debrox ordered, pt will return to have water irrigation/flush  -     Ear wax removal  -     carbamide peroxide (DEBROX) 6.5 % otic solution; Place 5 drops into both ears 2 times daily    Dizziness  Discussed changing positions slowly  Dizziness most likely related to bilateral cerumen impaction     Call or go to ED immediately if symptoms worsen or persist.    Return in about 8 days (around 8/7/2020) for f/u ear wax removal-Viola. , or sooner if necessary. Educational materials and/or home exercises printed for patient's review and were included in patient instructions on his/her After Visit Summary and given to patient at the end of visit. Counseled regarding above diagnosis, including possible risks and complications,  especially if left uncontrolled. Counseled regarding the possible side effects, risks, benefits and alternatives to treatment; patient and/or guardian verbalizes understanding, agrees, feels comfortable with and wishes to proceed with above treatment plan. Advised patient to call with any new medication issues, and read all Rx info from pharmacy to assure aware of all possible risks and side effects of medication before taking. Reviewed age and gender appropriate health screening exams and vaccinations.   Advised patient regarding importance of keeping up with recommended health maintenance and to schedule as soon as possible if overdue, as this is important in assessing for undiagnosed pathology, especially cancer, as well as protecting against potentially harmful/life threatening disease. Patient and/or guardian verbalizes understanding and agrees with above counseling, assessment and plan. All questions answered.     Rober Salazar, APRN - CNP

## 2020-08-19 NOTE — TELEPHONE ENCOUNTER
Spoke with patient to let them know that their medication refills were called into their pharmacy.     8/25/2020

## 2020-08-25 NOTE — PROGRESS NOTES
Department of Palliative Medicine  Ambulatory Note  Provider: Keysha Meza MD        Chief Complaint: René Score is a 80 y.o. female with chief complaint of Pain, Weakness    HPI:  René Score is a 80 y.o. female with significant past medical history of mild senile dementia, history if falls resulting in spinal fracture s/p kyphoplasty, osteoarthritis, and peptic ulcer disease, with several hospitalizations for GIB and anemia, who was referred to [address] [place] [address]. Assessment/Plan      Age related physical debility/falls:              -  Encouraged home safety              -  Has home care aids              -  Recently worked with in home PT              -  Encouraged to maintain current level of activity as tolerated              -  Goal is primarily to maintain comfort and quality of life     Chronic pain syndrome:              -  R/t osteoarthritis and hx of spinal compression fractures              -  Norco 5-325 mg Q 6 PRN, using 2 per day              -  Wears Lidocaine patches on her knees                Dementia:                     -  Daughter seems forgetfulness has been increasing     Insomnia:                     -  Remeron increase to 45 mg HS     Constipation:              -  Add Miralax 17 gm daily     Decreased oral intake:              -  Daughter is monitoring weight and weight has remained stable (104 lbs)              -  Encouraged continued use of ensure, using 1-2 per day              -  Has prepared meals              -  Has assistance with meals     Palliative Care Encounter:                            - Goals of care: improve or maintain function/quality of life, provide comfort care/support/palliation/relieve suffering, remain at home and preserve independence/autonomy/control                 - Code Status: DNR-CC     Follow Up:  8 weeks. They were encouraged to call with any questions, concerns, needs, or changes in symptoms.      Subjective:       René Score is a 80 y.o. female who presented with her daughter to this visit. She has complains of pain all over her body. She has history of osteoarthritis. her daughter explained to me, she lives in a building by herself. She has assistance with her food. She was recently in the hospital due to possible SBO. She said her dizziness and balance have become better after they cleaned out her ear. I told the daughter we will stick with the current regimen. She is using Norco twice daily and Remeron. Pain Assessment   Rating:  10  Description: aching  Duration: years  Frequency: daily  Location: Shoulder, back and knees  Alleviating Factors: relaxation and pain medication  Exacerbating Factors: unable to associate with any factor  Effect: change in function and interference with activities      Objective:     Physical Exam  BP (!) 142/82   Pulse 90   Wt 104 lb 14.4 oz (47.6 kg)   SpO2 94%   BMI 26.26 kg/m²     Gen:  Alert, appears stated age, well nourished, in no acute distress  HEENT:  Normocephalic, conjunctiva pink, no drainage, mucosa moist  Lungs:  CTA bilaterally, no audible rhonchi or wheezes noted  Heart[de-identified]  RRR, no murmur, rub, or gallop noted during exam  Abd:  Soft, non tender, non distended,   M/S/Ext:  Moving all extremities, no edema, pulses present  Skin:  Warm and dry  Neuro:  PERRL, Alert, oriented; following commands      Wells Symptom Assessment Score   Wells Score 8/25/2020 5/20/2020 3/3/2020   Pain Score Worst possible pain 5 5   Tiredness Score Worst possible tiredness 6 5   Nausea Score Not nauseated Not nauseated 1   Depression Score 5 Not depressed 1   Anxiety Score 5 1 1   Drowsiness Score Not drowsy 5 5   Appetite Score 2 6 5   Wellbeing Score 5 4 3   Dyspnea Score 5 No shortness of breath 3   Other Problem Score 2 3 3   Total Assessment Score(calculated) 44 30 32     Assessed by: family.       Current Medications:  Medications reviewed: yes    Controlled Substances Monitoring: [place] reviewed 8/25/20. RX Monitoring 5/20/2020   Attestation -   Periodic Controlled Substance Monitoring Possible medication side effects, risk of tolerance/dependence & alternative treatments discussed. ;No signs of potential drug abuse or diversion identified. ;Assessed functional status. ;Obtaining appropriate analgesic effect of treatment. Elin Black MD  Palliative Care Department     Time/Communication  Greater than 50% of time spent, total 20 minutes in face-to-face counseling and coordination of care regarding symptom management.

## 2020-10-07 NOTE — ED NOTES
Family and patient were explained discharge instructions and repeated them back Iv was removed no complications pt going home with family     Elroy Kothari RN  10/07/20 1953

## 2020-10-07 NOTE — ED NOTES
Staff attempt to redraw CBC. Patient refuses because \"it hurts too much\".      Eduardo Small RN  10/07/20 9924
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
## 2020-10-07 NOTE — ED NOTES
Daughter at bedside. Concerned and upset that she was required to wait in vehicle for a whole 25 minutes.      Maury Sargent RN  10/07/20 1257

## 2020-10-07 NOTE — ED NOTES
Blood culture specimen obtained, per policy, from [address] [place] [address] by JIMMY. Specimen two of two collected at this time.      Aaron Daugherty RN  10/07/20 [id number]

## 2020-10-07 NOTE — ED PROVIDER NOTES
HPI   Patient is a 15-year-old female presents with chief complaint of right flank pain. Patient is alert and oriented x2. Patient notes that she has 1 kidney on the right side. Patient lives at home by herself and had EMS bring her in. Patient stated that the pain has started for the past few days but is gotten significantly worse today. Patient notes that she has had a cough for the past week or so. When asked to specify timing of this states that she must ask her daughter because she is more aware of her symptoms. Patient states that she does not follow with her urine burns or has a change in color or smell. She denies any fevers, chills, nausea, vomiting, chest pain, shortness of breath. Review of Systems   Constitutional: Negative for activity change, appetite change, chills, diaphoresis and fever. HENT: Negative for congestion, sinus pressure and sneezing. Respiratory: Positive for cough. Negative for chest tightness and shortness of breath. Cardiovascular: Negative for chest pain and palpitations. Gastrointestinal: Positive for abdominal pain. Negative for abdominal distention, blood in stool, constipation, diarrhea, nausea and vomiting. Genitourinary: Positive for flank pain. Negative for decreased urine volume, difficulty urinating, dyspareunia, dysuria, enuresis, frequency, pelvic pain and urgency. Skin: Negative for rash and wound. Neurological: Negative for dizziness, weakness and headaches. Psychiatric/Behavioral: Negative for agitation and confusion. Physical Exam  Constitutional:       General: She is not in acute distress. Appearance: Normal appearance. She is not ill-appearing. HENT:      Mouth/Throat:      Mouth: Mucous membranes are moist.   Eyes:      Extraocular Movements: Extraocular movements intact. Pupils: Pupils are equal, round, and reactive to light. Cardiovascular:      Rate and Rhythm: Normal rate and regular rhythm.       Pulses: Normal pulses. Heart sounds: Normal heart sounds. No murmur. Pulmonary:      Effort: Pulmonary effort is normal. No respiratory distress. Breath sounds: Rales present. No wheezing. Comments: Bilateral rails of the lung bases. Abdominal:      General: Abdomen is flat. There is no distension. Palpations: Abdomen is soft. There is no mass. Tenderness: There is no abdominal tenderness. There is right CVA tenderness. There is no left CVA tenderness or guarding. Hernia: No hernia is present. Comments: Patient has abdominal pain significant on the left side. Voluntary guarding. Musculoskeletal:         General: No swelling, tenderness or deformity. Skin:     General: Skin is warm and dry. Capillary Refill: Capillary refill takes less than 2 seconds. Coloration: Skin is pale. Skin is not jaundiced. Findings: No bruising or erythema. Neurological:      General: No focal deficit present. Mental Status: She is alert. Comments: Alert and oriented x2. Psychiatric:         Mood and Affect: Mood normal.         Behavior: Behavior normal.          Procedures     [facility]     ED Course as of Oct 07 1639   Wed Oct 07, 2020   1501 Patient stated that her pain is significantly improved. [JM]      ED Course User Index  [JM] Evangelina Leon MD        Patient is a 27-year-old male presents with a chief complaint of right flank pain. Patient notes that she has one kidney on that side. Patient states that she is unable to tell if she is had a kidney stone in the past.  Patient denies any shortness of breath. Patient unable to articulate started the pain started. Patient had CT abdomen that indicated possible COVID. Patient had a cover test that was negative. Patient appeared to have cholelithiasis on CT. Patient will have ultrasound done of the gallbladder. She was handed off to oncoming physician. Patient will be discharged pending normal ultrasound results. Sherry Rodriguez MD  Resident  10/08/20 [id number]

## 2020-10-07 NOTE — ED NOTES
O2 Desaturation to 77 RA during ambulation of approx 50ft to bathroom      Martinez Maya, CEDRICK  10/07/20 Boaz Serrano, RN  10/07/20 [id number]

## 2020-10-07 NOTE — ED NOTES
Blood culture specimen obtained, per policy, from [facility] by JIMMY. Specimen one of two collected at this time.      Justin Kimble RN  10/07/20 [id number]

## 2020-10-13 PROBLEM — J18.9 PNEUMONIA: Status: ACTIVE | Noted: 2020-01-01

## 2020-10-13 NOTE — PROGRESS NOTES
Marlon Perez is a 80 y.o. female who presents today for   Chief Complaint   Patient presents with    Follow-Up from Hospital         HPI    Pt presents for ED f/u appointment on 10/7/20. Pt was dx with Acute Cystitis and Cholelithiasis-see ED report,  Daughter is present with pt today. Pt was notified yesterday regarding Positive Blood Culture- Staph hominis- Sensitivity reviewed and Doxycycline was prescribed, daughter did not yet  prescription. Pt was also tested for Covid 19  Pt appears ill and lethargic, utilizing w/c d/t inability to use walker -increased weakness. Pt c/o chills, severe fatigue/weakness, inability to eat/drink, coughing and dyspnea. [address]:  Patient's past medical, surgical, social and/or family history reviewed, updated in chart, and are non-contributory (unless otherwise stated). Medications and allergies also reviewed and updated in chart. Review of Systems  Review of Systems   Constitutional: Positive for activity change, appetite change, chills and fatigue. Negative for diaphoresis and fever. Eyes: Negative for photophobia, pain, discharge, redness, itching and visual disturbance. Respiratory: Positive for cough and shortness of breath. Negative for choking, chest tightness, wheezing and stridor. Cardiovascular: Negative for chest pain, palpitations and leg swelling. Gastrointestinal: Negative for abdominal pain, diarrhea, nausea and vomiting. Genitourinary: Negative for decreased urine volume, difficulty urinating, dysuria, enuresis, flank pain, frequency, hematuria, pelvic pain and urgency. Acute Cystitis   Skin: Negative for color change, pallor, rash and wound.        Physical Exam:    VS:  /72   Pulse 90   Temp 98.1 °F (36.7 °C)   Resp 26   Ht 4' 5\" (1.346 m)   Wt 104 lb (47.2 kg)   SpO2 94%   BMI 26.03 kg/m²   LAST WEIGHT:  Wt Readings from Last 3 Encounters:   10/13/20 104 lb (47.2 kg)   08/25/20 104 lb 14.4 oz (47.6 kg) 07/30/20 104 lb (47.2 kg)     Physical Exam  Vitals signs and nursing note reviewed. Constitutional:       Appearance: She is ill-appearing and toxic-appearing. She is not diaphoretic. HENT:      Mouth/Throat:      Mouth: Mucous membranes are dry. Pharynx: No oropharyngeal exudate or posterior oropharyngeal erythema. Eyes:      General:         Right eye: No discharge. Left eye: No discharge. Extraocular Movements: Extraocular movements intact. Conjunctiva/sclera: Conjunctivae normal.      Pupils: Pupils are equal, round, and reactive to light. Cardiovascular:      Rate and Rhythm: Tachycardia present. Pulses: Normal pulses. Heart sounds: Normal heart sounds. Pulmonary:      Breath sounds: Rhonchi (bases-bilateral) present. Comments: Pulse ox 89-94%, Resp 26  Skin:     General: Skin is warm. Coloration: Skin is not jaundiced or pale. Findings: No bruising, erythema, lesion or rash. Neurological:      Mental Status: She is alert. Assessment / Plan:      Vinicius Allan was seen today for follow-up from hospital.    Diagnoses and all orders for this visit:\        PT TO ED NOW VIA EMS FOR EVALUATION AND TREATMENT    Lethargic    Shortness of breath    Cough    Appetite loss    Positive blood culture    Acute cystitis without hematuria    Other orders  -     Cancel: INFLUENZA, QUADV, ADJUVANTED, 65 YRS =, IM, PF, PREFILL SYR, 0.5ML (FLUAD)         Call or go to ED immediately if symptoms worsen or persist.    Return if symptoms worsen or fail to improve. , or sooner if necessary. Educational materials and/or home exercises printed for patient's review and were included in patient instructions on his/her After Visit Summary and given to patient at the end of visit. Counseled regarding above diagnosis, including possible risks and complications,  especially if left uncontrolled.     Counseled regarding the possible side effects, risks, benefits and alternatives to treatment; patient and/or guardian verbalizes understanding, agrees, feels comfortable with and wishes to proceed with above treatment plan. Advised patient to call with any new medication issues, and read all Rx info from pharmacy to assure aware of all possible risks and side effects of medication before taking. Reviewed age and gender appropriate health screening exams and vaccinations. Advised patient regarding importance of keeping up with recommended health maintenance and to schedule as soon as possible if overdue, as this is important in assessing for undiagnosed pathology, especially cancer, as well as protecting against potentially harmful/life threatening disease. Patient and/or guardian verbalizes understanding and agrees with above counseling, assessment and plan. All questions answered.     Ama Bauer, APRN - CNP
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
## 2020-10-14 NOTE — FLOWSHEET NOTE
Inpatient Wound Care (Initial consult) 4515A    Admit Date: 10/13/2020  2:26 PM    Reason for consult:  Coccyx    Significant history:  Per H&P    Chief Complaint: Shortness of breath and cough     History Of Present Illness:      80 y.o. female who presented to [address] [place] [address] with shortness of breath and cough. Past medical history includes arthritis, hypertension, early dementia. Daughter answers most of the questions. Apparently for the past 2 to 3 days the patient has developed a moist cough unknown colored mucus. No nausea no vomiting but she has had chills. She also has had diarrhea. No chest pain. The shortness of breath is progressively gotten worse to the point where she came to the emergency room. CT scan shows pulmonary fibrosis and possible pneumonia. Findings:      10/14/20 0923   Skin Integrity Site 2   Skin Integrity Location 2 Bruising   Location 2 BUE   Skin Integrity Site 3   Skin Integrity Location 3   (erosion, dry, redness, blanchable)    Location 3   (bilateral buttocks)   Wound 10/14/20 Coccyx Right   Date First Assessed/Time First Assessed: 10/14/20 0130   Present on Hospital Admission: Yes  Primary Wound Type: Pressure Injury  Location: Coccyx  Wound Location Orientation: Right   Wound Image    Wound Etiology Deep tissue/Injury   Wound Length (cm) 0.8 cm   Wound Width (cm) 0.4 cm   Wound Depth (cm)   (unable to determine)   Wound Surface Area (cm^2) 0.32 cm^2   Change in Wound Size % (l*w) 42.86   Wound Assessment   (purple)   Drainage Amount None   Loli-wound Assessment   (pink)      **Informed Consent**    The patient has given verbal consent to have photos taken of wound and inserted into their chart as part of their permanent medical record for purposes of documentation, treatment management and/or medical review.    All Images taken on 10/14/20 of patient name: Darrel Garay were transmitted and stored on secured [employer] located within [facility]
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
## 2020-10-14 NOTE — PROGRESS NOTES
Hospital Medicine Progress Note      Date of Admission: 10/13/2020  Hospital day # 1    Course: Pneumonia, dehydration, GILMER    Subjective       Feels short of breath, weak  Stable overnight. No other overnight issues reported. Exam:    /66   Pulse 95   Temp 97.5 °F (36.4 °C) (Temporal)   Resp 26   Ht 4' 5\" (1.346 m)   Wt 104 lb (47.2 kg)   SpO2 98%   BMI 26.03 kg/m²     General appearance: Acutely ill, appears stated age and cooperative. HEENT: Pupils equal, round, and reactive to light. Conjunctivae/corneas clear. Neck: Supple. No jugular venous distention. Trachea midline. Respiratory:  Normal respiratory effort. Clear to auscultation, bilaterally without Rales/Wheezes/Rhonchi. Cardiovascular: S1/S2  Abdomen: Soft, non-tender, non-distended with normal bowel sounds. Musculoskeletal: No clubbing, cyanosis or edema bilaterally.    Skin:  No rashes    Neurologic: awake, alert and following commands     Medications:  Reviewed    Infusion Medications    sodium chloride 100 mL/hr at 10/14/20 0321     Scheduled Medications    doxycycline hyclate  100 mg Oral BID    lisinopril  5 mg Oral Daily    mirtazapine  45 mg Oral Nightly    pantoprazole  40 mg Oral QAM AC    sucralfate  1 g Oral 4x Daily    dexamethasone  4 mg Intravenous BID    cefTRIAXone (ROCEPHIN) IV  1 g Intravenous Q24H    enoxaparin  30 mg Subcutaneous Daily    polyethylene glycol  17 g Oral Daily    mineral oil-hydrophilic petrolatum   Topical BID     PRN Meds: acetaminophen, diphenhydrAMINE, ipratropium-albuterol, ondansetron, mineral oil-hydrophilic petrolatum **AND** mineral oil-hydrophilic petrolatum    I/O    Intake/Output Summary (Last 24 hours) at 10/14/2020 1138  Last data filed at 10/14/2020 1000  Gross per 24 hour   Intake 120 ml   Output --   Net 120 ml       Labs:   Recent Labs     10/13/20  1515 10/14/20  0459   WBC 10.0 11.5   HGB 9.2* 8.6*   HCT 29.7* 28.1*    315       Recent Labs     10/13/20  1515

## 2020-10-14 NOTE — PROGRESS NOTES
Comprehensive Nutrition Assessment    Type and Reason for Visit:  Initial, Wound    Nutrition Recommendations/Plan: Continue current diet as ordered. Will add ONS & follow     Nutrition Assessment:  Pt admit w/ SOB 2/2 pulmonary fibrosis. Noted wounds increasing nutrient needs. Will add ONS & follow    Malnutrition Assessment:  Malnutrition Status: At risk for malnutrition (Comment)    Context:  Chronic Illness       Estimated Daily Nutrient Needs:  Energy (kcal):  8427-2147; Weight Used for Energy Requirements:  Current     Protein (g):  1.5-1.8= 45-55; Weight Used for Protein Requirements:  Ideal        Fluid (ml/day):  1000ml; Weight Used for Fluid Requirements:  Current      Nutrition Related Findings:  +i/os, oriented x2 w/ hx of dementia, abd WNL, no edema noted      Wounds:  Deep Tissue Injury       Current Nutrition Therapies:    DIET NO SALT ADDED (3-4 GM); Anthropometric Measures:  · Height: 4' 5\" (134.6 cm)  · Current Body Weight: 104 lb (47.2 kg)   · Usual Body Weight: 112 lb (50.8 kg)(2/2020 per EMR)     · Ideal Body Weight: 65 lbs; % Ideal Body Weight 160 %   · BMI: 26  · BMI Categories: Overweight (BMI 25.0-29. 9)       Nutrition Diagnosis:   · Increased nutrient needs related to increase demand for energy/nutrients as evidenced by wounds      Nutrition Interventions:   Food and/or Nutrient Delivery:  Continue Current Diet, Start Oral Nutrition Supplement  Nutrition Education/Counseling:  No recommendation at this time   Coordination of Nutrition Care:  Continued Inpatient Monitoring    Goals:  >50% of meals/ONS       Nutrition Monitoring and Evaluation:   Food/Nutrient Intake Outcomes:  Food and Nutrient Intake, Supplement Intake  Physical Signs/Symptoms Outcomes:  Biochemical Data, GI Status, Fluid Status or Edema, Hemodynamic Status, Nutrition Focused Physical Findings, Weight, Skin     Discharge Planning:     Too soon to determine     Electronically signed by Kiara Kaye RD, LD on 10/14/20 at 11:17 AM EDT    Contact: x [phone]

## 2020-10-14 NOTE — TELEPHONE ENCOUNTER
Call from [place] daughter to let us know her mother is inpatient with pneumonia. Called back to [place] , no answer, left message. Encouraged her to ask for a consult for palliative medicine if she would like someone from our team to see her mom while she is here.
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
## 2020-10-14 NOTE — CARE COORDINATION
Met with patient at bedside to discuss transition of care. She lives at a senior living high rise facility. Her apartment is handicap accessible with elevator access. She has a ww, bsc, cane and shower chair. She has aides services through [employer] 2 days a week 4-7 for light cleaning and bathing. Her PCP is [facility] CNP. Pharmacy is [facility] in [facility], they deliver to her apt. She has a hx with [facility]. She is planning for home at discharge. Therapy ordered, will await evals posted and discuss with pt if mihir/hhc is recommended.  CM will continue to follow  Art Mata RN  [facility] Case Management  [phone]  ]

## 2020-10-15 NOTE — PLAN OF CARE
Problem: Pain:  Goal: Pain level will decrease  Description: Pain level will decrease  10/15/2020 0954 by Caroline Alejandre RN  Outcome: Met This Shift  10/15/2020 0604 by Wilfrido Coley RN  Outcome: Met This Shift  Goal: Control of acute pain  Description: Control of acute pain  10/15/2020 0954 by Caroline Alejandre RN  Outcome: Met This Shift  10/15/2020 0604 by Wilfrido Coley RN  Outcome: Met This Shift  Goal: Control of chronic pain  Description: Control of chronic pain  10/15/2020 0954 by Caroline Alejandre RN  Outcome: Met This Shift  10/15/2020 0604 by Wilfrido Coley RN  Outcome: Met This Shift     Problem: Falls - Risk of:  Goal: Will remain free from falls  Description: Will remain free from falls  10/15/2020 0954 by Caroline Alejandre RN  Outcome: Met This Shift  10/15/2020 0604 by Wilfrido Coley RN  Outcome: Met This Shift  Goal: Absence of physical injury  Description: Absence of physical injury  10/15/2020 0954 by Caroline Alejandre RN  Outcome: Met This Shift  10/15/2020 0604 by Wilfrido Coley RN  Outcome: Met This Shift     Problem: Skin Integrity:  Goal: Will show no infection signs and symptoms  Description: Will show no infection signs and symptoms  10/15/2020 0954 by Caroline Alejandre RN  Outcome: Met This Shift  10/15/2020 0604 by Wilfrido Coley RN  Outcome: Met This Shift  Goal: Absence of new skin breakdown  Description: Absence of new skin breakdown  10/15/2020 0954 by Caroline Alejandre RN  Outcome: Met This Shift  10/15/2020 0604 by Wilfrido Coley RN  Outcome: Met This Shift     Problem: Musculor/Skeletal Functional Status  Goal: Highest potential functional level  Outcome: Met This Shift  Goal: Absence of falls  Outcome: Met This Shift
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
## 2020-10-15 NOTE — PROGRESS NOTES
Physical Therapy  Physical Therapy Initial Assessment     Name: Sangita Todd  : 1930  MRN: [id number]    Referring Provider:  Abena Guadarrama MD    Date of Service: 10/15/2020    Evaluating PT:  Ana Eid PT, DPT. [id number]    Room #:  4515/4515-A  Diagnosis:  Pneumonia   Reason for admission:  SOB and cough  Precautions:  Falls, O2  Pertinent PMHx: HTN, arthritis, UTI, dementia, deaf in left ear, osteoporosis, neuromuscular disorder  Procedures: none  Equipment Recommendations:  FWW    SUBJECTIVE:  Pt lives at [facility] apartment with elevator access and aides available to help. Pt ambulated with [employer] PTA. OBJECTIVE:   Initial Evaluation  Date: 10/15 Treatment   Short Term/ Long Term   Goals   AM-PAC 6 Clicks 96/01     Was pt agreeable to Eval/treatment? yes     Does pt have pain? denies     Bed Mobility  Rolling: NT  Supine to sit: SBA  Sit to supine: SBA  Scooting: SBA  Independent    Transfers Sit to stand: SBA  Stand to sit: SBA  Stand pivot: NT  Independent    Ambulation    side steps at EOB with [employer] SBA    >15 feet with [employer] Supervision    Stair negotiation: ascended and descended  NT  TBD   ROM BUE:  See OT eval   BLE:  WFL     Strength BUE:  See OT eval   BLE:  knee ext 4/5  Ankle DF 4/5  Increase by 1/3 MMT grade    Balance Sitting EOB:  SBA  Dynamic Standing:  SBA  Sitting EOB:  Independent   Dynamic Standing: Mod I     -Pt is A & O x 3  -Sensation:  unremarkable   -Edema:  unremarkable     Therapeutic Exercises:  functional activity     Patient education  Pt educated on safety, sequencing of transfers, and role of PT    Patient response to education:   Pt verbalized understanding Pt demonstrated skill Pt requires further education in this area   yes yes reinforce     ASSESSMENT:    Comments:  Pt received reclined supine and agreeable to PT session  Patient required no hands on assist for bed mobility or to scoot to EOB. Pulse ox reading 96-99% throughout session.  Patient was able to stand and complete side steps at EOB with no hands on assist. Patient incontinent with coughing and all mobility -- assisted with hygiene and linen changes. Patient returned to sitting in bed to end session. Pt with all needs met and call light in reach. Pt would benefit from continued PT POC to address functional deficits described above. Treatment:  Patient practiced and was instructed in the following treatment:     Patient education provided continuously throughout session for sequencing, safety maintenance, and improving any deficits found during the evaluation.  Bed mobility training - pt given verbal and tactile cues to facilitate proper sequencing and safety during rolling and supine>sit   STS and pivot transfer training - pt educated on proper hand and foot placement, safety and sequencing, and use of proper technique to safely complete sit<>stand and pivot transfers   Side steps completed EOB with no hands on assistance        Pt's/ family goals   1. None stated    Patient and or family understand(s) diagnosis, prognosis, and plan of care. yes    PLAN:    Current Treatment Recommendations   [x] Strengthening     [] ROM   [x] Balance Training   [x] Endurance Training   [x] Transfer Training   [x] Gait Training   [] Stair Training   [] Positioning   [x] Safety and Education Training   [] Patient/Caregiver Education   [] HEP  [] Other     Frequency of treatments: 2-5x/week x 1-2 weeks. Time in  0805  Time out  0835    Total Treatment Time 23 minutes     Evaluation Time includes thorough review of current medical information, gathering information on past medical history/social history and prior level of function, completion of standardized testing/informal observation of tasks, assessment of data and education on plan of care and goals.     CPT codes:  [] Low Complexity PT evaluation 36278  [] Moderate Complexity PT evaluation 96272  [] High Complexity PT evaluation 78122  [] PT Re-evaluation [id number]  [] Gait training 73968 - minutes  [] Manual therapy 24128 - minutes  [] Therapeutic activities 02787 23 minutes  [] Therapeutic exercises 04013 - minutes  [] Neuromuscular reeducation 98776 - minutes     Lexie Irvin, PT, DPT  [id number]  Javier Sheffield, SPT

## 2020-10-15 NOTE — PROGRESS NOTES
organomegaly   Extremities:no edema   Musculoskeletal: normal range of motion, no joint swelling, deformity or tenderness   Neurologic: reflexes normal and symmetric, no cranial nerve deficit noted    LABS/IMAGING:    CBC:  Lab Results   Component Value Date    WBC 12.3 (H) 10/15/2020    HGB 8.7 (L) 10/15/2020    HCT 28.4 (L) 10/15/2020    MCV 84.5 10/15/2020     10/15/2020    LYMPHOPCT 11.9 (L) 10/15/2020    RBC 3.36 (L) 10/15/2020    MCH 25.9 (L) 10/15/2020    MCHC 30.6 (L) 10/15/2020    RDW 13.9 10/15/2020    NEUTOPHILPCT 83.3 (H) 10/15/2020    MONOPCT 4.0 10/15/2020    BASOPCT 0.1 10/15/2020    NEUTROABS 10.26 (H) 10/15/2020    LYMPHSABS 1.46 (L) 10/15/2020    MONOSABS 0.49 10/15/2020    EOSABS 0.00 (L) 10/15/2020    BASOSABS 0.01 10/15/2020       Recent Labs     10/15/20  0537 10/14/20  0459 10/13/20  1515   WBC 12.3* 11.5 10.0   HGB 8.7* 8.6* 9.2*   HCT 28.4* 28.1* 29.7*   MCV 84.5 85.2 85.3    315 305       BMP:   Recent Labs     10/13/20  1515 10/14/20  0459 10/15/20  0537    140 136   K 5.3* 4.8 4.7   CL 99 104 104   CO2 25 23 20*   BUN 30* 25* 23   CREATININE 1.5* 1.3* 1.2*       MG:   Lab Results   Component Value Date    MG 2.2 03/29/2019     Ca/Phos:   Lab Results   Component Value Date    CALCIUM 9.2 10/15/2020     Amylase: No results found for: AMYLASE  Lipase:   Lab Results   Component Value Date    LIPASE 11 (L) 10/07/2020     LIVER PROFILE:   Recent Labs     10/13/20  1515 10/14/20  0459 10/15/20  0537   AST 30 15 14   ALT 7 5 6   BILITOT <0.2 <0.2 <0.2   ALKPHOS 70 69 69       PT/INR: No results for input(s): PROTIME, INR in the last 72 hours. APTT: No results for input(s): APTT in the last 72 hours.     Cardiac Enzymes:  Lab Results   Component Value Date    CKTOTAL 45 02/02/2020    TROPONINI <0.01 10/13/2020                   PROBLEM LIST:  Patient Active Problem List   Diagnosis    Hypertension    Osteoporosis    Primary osteoarthritis involving multiple joints    Closed present.

## 2020-10-15 NOTE — PROGRESS NOTES
Hospital Medicine Progress Note      Date of Admission: 10/13/2020  Hospital day # 2    Course: Pneumonia, dehydration, GILMER    Subjective       Feels better  Stable overnight. No other overnight issues reported. Exam:    /76   Pulse 84   Temp 98.1 °F (36.7 °C) (Temporal)   Resp 17   Ht 4' 5\" (1.346 m)   Wt 104 lb (47.2 kg)   SpO2 96%   BMI 26.03 kg/m²     General appearance: Acutely ill, appears stated age and cooperative. HEENT: Pupils equal, round, and reactive to light. Conjunctivae/corneas clear. Neck: Supple. No jugular venous distention. Trachea midline. Respiratory:  .  Cardiovascular: S1/S2  Abdomen: Soft, non-tender, non-distended with normal bowel sounds. Musculoskeletal: No clubbing, cyanosis or edema bilaterally.    Skin:  No rashes    Neurologic: awake, alert and following commands     Medications:  Reviewed    Infusion Medications     Scheduled Medications    doxycycline hyclate  100 mg Oral BID    lisinopril  5 mg Oral Daily    mirtazapine  45 mg Oral Nightly    pantoprazole  40 mg Oral QAM AC    sucralfate  1 g Oral 4x Daily    dexamethasone  4 mg Intravenous BID    cefTRIAXone (ROCEPHIN) IV  1 g Intravenous Q24H    enoxaparin  30 mg Subcutaneous Daily    polyethylene glycol  17 g Oral Daily    mineral oil-hydrophilic petrolatum   Topical BID     PRN Meds: acetaminophen, diphenhydrAMINE, ipratropium-albuterol, ondansetron, mineral oil-hydrophilic petrolatum **AND** mineral oil-hydrophilic petrolatum    I/O    Intake/Output Summary (Last 24 hours) at 10/15/2020 1116  Last data filed at 10/15/2020 0923  Gross per 24 hour   Intake --   Output 800 ml   Net -800 ml       Labs:   Recent Labs     10/13/20  1515 10/14/20  0459 10/15/20  0537   WBC 10.0 11.5 12.3*   HGB 9.2* 8.6* 8.7*   HCT 29.7* 28.1* 28.4*    315 309       Recent Labs     10/13/20  1515 10/14/20  0459 10/15/20  0537    140 136   K 5.3* 4.8 4.7   CL 99 104 104   CO2 25 23 20*   BUN 30* 25* 23 CREATININE 1.5* 1.3* 1.2*   CALCIUM 9.8 9.3 9.2       Recent Labs     10/13/20  1515 10/14/20  0459 10/15/20  0537   PROT 7.3 6.8 6.8   ALKPHOS 70 69 69   ALT 7 5 6   AST 30 15 14   BILITOT <0.2 <0.2 <0.2       No results for input(s): INR in the last 72 hours. Recent Labs     10/13/20  1515   TROPONINI <0.01       Other labs:  Lab Results   Component Value Date    CHOL 175 10/27/2018    TRIG 95 10/27/2018    HDL 65 10/27/2018    LDLCALC 91 10/27/2018    TSH 2.580 10/27/2018    INR 1.0 05/23/2019    LABA1C 6.1 (H) 08/30/2017       Radiology:  Imaging studies reviewed today. ASSESSMENT:    Pneumonia  Poss UIP  Pulmonary fibrosis  Hypertension  GILMER      PLAN:    Ceftriaxone IV  Doxycycline  Nebulizer treatments  COVID-19 test negative  IV fluids  Follow electrolytes  Follow BUN/creatinine  Patient has papers for DNR CC signed by [address] [place] [address]  Discharge orders in place      Diet: DIET NO SALT ADDED (3-4 GM); Dietary Nutrition Supplements: Standard High Calorie Oral Supplement  Code Status: DNR-CC    PT/OT Eval Status: [x] Ordered [] Evaluation noted [] Not applicable    DVT Prophylaxis: [x]Lovenox []Heparin []PCD [] [address] []Encouraged ambulation []N/A    Likely disposition when able:  [x]Home [] Home with [facility] [] SNF/JAMES [] Acute Rehab [] LTAC []Other    +++++++++++++++++++++++++++++++++++++++++++++++++  Jose Garces MD, Hospitalist  +++++++++++++++++++++++++++++++++++++++++++++++++  NOTE: This report was transcribed using voice recognition software.  Every effort was made to ensure accuracy; however, inadvertent computerized transcription errors may be present.

## 2020-10-16 NOTE — CARE COORDINATION
Spoke with patient based on therapy evals and discussed possible short mihir stay as she lives alone. Patient continues to refuse MIHIR and only agreeable to home. She is agreeable to [address], or if they are unavailable another homecare agency. Referral to [facility] homecare. Daughter will provide transport home. 65 Daughter at bedside and is very concerned about patient going home as she lives alone with intermittent aide services. Discussed with patient and she is agreeable to short MIHIR stay. choiced for 1. [facility] [facility], 2. [facility]. [facility], no beds, 2. [facility] accepted and initiated precert. HENS and ambulette on soft chart. COVID (-) 10/13. For questions I can be reached at [phone]. [place], [place]    The Plan for Transition of Care is related to the following treatment goals: discharge planning when stable     The Patient and/or patient representative Clay Perez was provided with a choice of provider and agrees   with the discharge plan. [x] Yes [] No    Freedom of choice list was provided with basic dialogue that supports the patient's individualized plan of care/goals, treatment preferences and shares the quality data associated with the providers.  [x] Yes [] No

## 2020-10-17 NOTE — PROGRESS NOTES
Hospital Medicine Progress Note      Date of Admission: 10/13/2020  Hospital day # 3    Course: Pneumonia, dehydration, GILMER    Subjective       Feels better  Patient currently off oxygen  Stable overnight. No other overnight issues reported. Exam:    BP (!) 168/83   Pulse 88   Temp 96.6 °F (35.9 °C) (Temporal)   Resp 30   Ht 4' 5\" (1.346 m)   Wt 104 lb (47.2 kg)   SpO2 96%   BMI 26.03 kg/m²     General appearance: Acutely ill, appears stated age and cooperative. HEENT: Pupils equal, round, and reactive to light. Conjunctivae/corneas clear. Neck: Supple. No jugular venous distention. Trachea midline. Respiratory:  .  Cardiovascular: S1/S2  Abdomen: Soft, non-tender, non-distended with normal bowel sounds. Musculoskeletal: No clubbing, cyanosis or edema bilaterally.    Skin:  No rashes    Neurologic: awake, alert and following commands     Medications:  Reviewed    Infusion Medications     Scheduled Medications    cefdinir  300 mg Oral Daily    doxycycline hyclate  100 mg Oral BID    lisinopril  5 mg Oral Daily    mirtazapine  45 mg Oral Nightly    pantoprazole  40 mg Oral QAM AC    sucralfate  1 g Oral 4x Daily    dexamethasone  4 mg Intravenous BID    enoxaparin  30 mg Subcutaneous Daily    polyethylene glycol  17 g Oral Daily    mineral oil-hydrophilic petrolatum   Topical BID     PRN Meds: acetaminophen, diphenhydrAMINE, ipratropium-albuterol, ondansetron, mineral oil-hydrophilic petrolatum **AND** mineral oil-hydrophilic petrolatum    I/O    Intake/Output Summary (Last 24 hours) at 10/16/2020 2041  Last data filed at 10/16/2020 1521  Gross per 24 hour   Intake 240 ml   Output 0 ml   Net 240 ml       Labs:   Recent Labs     10/14/20  0459 10/15/20  0537 10/16/20  0458   WBC 11.5 12.3* 11.7*   HGB 8.6* 8.7* 9.8*   HCT 28.1* 28.4* 31.6*    309 339       Recent Labs     10/14/20  0459 10/15/20  0537 10/16/20  0458    136 137   K 4.8 4.7 4.9    104 102   CO2 23 20* 22 BUN 25* 23 28*   CREATININE 1.3* 1.2* 1.1*   CALCIUM 9.3 9.2 9.6       Recent Labs     10/14/20  0459 10/15/20  0537 10/16/20  0458   PROT 6.8 6.8 6.9   ALKPHOS 69 69 74   ALT 5 6 8   AST 15 14 15   BILITOT <0.2 <0.2 <0.2       No results for input(s): INR in the last 72 hours. No results for input(s): Jami Comment in the last 72 hours. Other labs:  Lab Results   Component Value Date    CHOL 175 10/27/2018    TRIG 95 10/27/2018    HDL 65 10/27/2018    LDLCALC 91 10/27/2018    TSH 2.580 10/27/2018    INR 1.0 05/23/2019    LABA1C 6.1 (H) 08/30/2017       Radiology:  Imaging studies reviewed today. ASSESSMENT:    Pneumonia  Poss UIP  Pulmonary fibrosis  Hypertension  GILMER      PLAN:    Ceftriaxone IV  Doxycycline  Nebulizer treatments  COVID-19 test negative  IV fluids  Follow electrolytes  Follow BUN/creatinine  Patient has papers for DNR CC signed by POA  Patient and family have decided for nursing facility  Disposition skilled nursing facility once arrangements/pre cert approved  Discharge canceled      Diet: DIET NO SALT ADDED (3-4 GM); Dietary Nutrition Supplements: Standard High Calorie Oral Supplement  Code Status: DNR-CC    PT/OT Eval Status: [x] Ordered [] Evaluation noted [] Not applicable    DVT Prophylaxis: [x]Lovenox []Heparin []PCD [] [address] []Encouraged ambulation []N/A    Likely disposition when able:  [x]Home [] Home with [facility] [] SNF/JAMES [] Acute Rehab [] LTAC []Other    +++++++++++++++++++++++++++++++++++++++++++++++++  Jake Paulson MD, Hospitalist  +++++++++++++++++++++++++++++++++++++++++++++++++  NOTE: This report was transcribed using voice recognition software.  Every effort was made to ensure accuracy; however, inadvertent computerized transcription errors may be present.

## 2020-10-17 NOTE — PROGRESS NOTES
Hospital Medicine Progress Note      Date of Admission: 10/13/2020  Hospital day # 4    Course: Pneumonia, dehydration, GILMER    Subjective       Refers feels better  No complaints at time evaluation  Patient currently at room air   Stable overnight. No other overnight issues reported. Exam:    BP (!) 184/84   Pulse 78   Temp 96.3 °F (35.7 °C) (Temporal)   Resp 16   Ht 4' 5\" (1.346 m)   Wt 104 lb (47.2 kg)   SpO2 95%   BMI 26.03 kg/m²     General appearance: Acutely ill, appears stated age and cooperative. HEENT: Pupils equal, round, and reactive to light. Conjunctivae/corneas clear. Neck: Supple. No jugular venous distention. Trachea midline. Respiratory:  .  Cardiovascular: S1/S2  Abdomen: Soft, non-tender, non-distended with normal bowel sounds. Musculoskeletal: No clubbing, cyanosis or edema bilaterally.    Skin:  No rashes    Neurologic: awake, alert and following commands     Medications:  Reviewed    Infusion Medications     Scheduled Medications    cefdinir  300 mg Oral Daily    doxycycline hyclate  100 mg Oral BID    lisinopril  5 mg Oral Daily    mirtazapine  45 mg Oral Nightly    pantoprazole  40 mg Oral QAM AC    sucralfate  1 g Oral 4x Daily    dexamethasone  4 mg Intravenous BID    enoxaparin  30 mg Subcutaneous Daily    polyethylene glycol  17 g Oral Daily    mineral oil-hydrophilic petrolatum   Topical BID     PRN Meds: acetaminophen, diphenhydrAMINE, ipratropium-albuterol, ondansetron, mineral oil-hydrophilic petrolatum **AND** mineral oil-hydrophilic petrolatum    I/O    Intake/Output Summary (Last 24 hours) at 10/17/2020 1212  Last data filed at 10/17/2020 0810  Gross per 24 hour   Intake 700 ml   Output 0 ml   Net 700 ml       Labs:   Recent Labs     10/15/20  0537 10/16/20  0458 10/17/20  0652   WBC 12.3* 11.7* 10.9   HGB 8.7* 9.8* 9.0*   HCT 28.4* 31.6* 29.1*    339 327       Recent Labs     10/15/20  0537 10/16/20  0458 10/17/20  0652    137 137   K 4.7 4.9 4.7    102 103   CO2 20* 22 21*   BUN 23 28* 31*   CREATININE 1.2* 1.1* 1.2*   CALCIUM 9.2 9.6 9.2       Recent Labs     10/15/20  0537 10/16/20  0458 10/17/20  0652   PROT 6.8 6.9 6.7   ALKPHOS 69 74 65   ALT 6 8 8   AST 14 15 13   BILITOT <0.2 <0.2 <0.2       No results for input(s): INR in the last 72 hours. No results for input(s): John Oiler in the last 72 hours. Other labs:  Lab Results   Component Value Date    CHOL 175 10/27/2018    TRIG 95 10/27/2018    HDL 65 10/27/2018    LDLCALC 91 10/27/2018    TSH 2.580 10/27/2018    INR 1.0 05/23/2019    LABA1C 6.1 (H) 08/30/2017       Radiology:  Imaging studies reviewed today. ASSESSMENT:    Pneumonia  Poss UIP  Pulmonary fibrosis  Hypertension  GILMER      PLAN:    Cefdinir  Doxycycline  Nebulizer treatments  COVID-19 test negative  IV fluids  Follow electrolytes  Follow BUN/creatinine  Patient has papers for DNR CC signed by POA  Patient and family have decided for nursing facility  Position, patient will be discharged to skilled nursing facility once pre-CERT process completed      Diet: DIET NO SALT ADDED (3-4 GM); Dietary Nutrition Supplements: Standard High Calorie Oral Supplement  Code Status: DNR-CC    PT/OT Eval Status: [x] Ordered [] Evaluation noted [] Not applicable    DVT Prophylaxis: [x]Lovenox []Heparin []PCD [] [address] []Encouraged ambulation []N/A    Likely disposition when able:  [x]Home [] Home with [facility] [] SNF/JAMES [] Acute Rehab [] LTAC []Other    +++++++++++++++++++++++++++++++++++++++++++++++++  Rodney Amos MD, Hospitalist  +++++++++++++++++++++++++++++++++++++++++++++++++  NOTE: This report was transcribed using voice recognition software.  Every effort was made to ensure accuracy; however, inadvertent computerized transcription errors may be present.

## 2020-10-18 NOTE — PROGRESS NOTES
[address] [facility]                Phone: [phone]   Fax: [phone]    Physical Therapy   Date:  10/18/2020    Patient Name:  Ofe Taylor    :  1930  MRN: [id number]    Room #:  5402/5402-B    Physical therapy attempted however could not be completed at this time due to:    Pt off the unit currently. Will attempt again when pt is able to participate with therapy. Thank you kindly for the opportunity to assist in the care of this pt.       Siddhartha Marroquin, ESTELA  [id number]

## 2020-10-18 NOTE — PROGRESS NOTES
Pulmonary [facility]                             Pulmonary Consult/Progress Note :            Reason for Consultation:ILD  CC : SOB   HPI:   Patient has been doing very well, she is on RA L,     She denies any fever or chills, there is no chest pain  She has dementia make further history limited    JOHANA came back negative    PHYSICAL EXAMINATION:     VITAL SIGNS:  BP (!) 160/78   Pulse 96   Temp 98.2 °F (36.8 °C) (Temporal)   Resp 18   Ht 4' 5\" (1.346 m)   Wt 104 lb (47.2 kg)   SpO2 98%   BMI 26.03 kg/m²   Wt Readings from Last 3 Encounters:   10/13/20 104 lb (47.2 kg)   10/13/20 104 lb (47.2 kg)   08/25/20 104 lb 14.4 oz (47.6 kg)     Temp Readings from Last 3 Encounters:   10/18/20 98.2 °F (36.8 °C) (Temporal)   10/13/20 98.1 °F (36.7 °C)   10/07/20 97 °F (36.1 °C)     TMAX:  BP Readings from Last 3 Encounters:   10/18/20 (!) 160/78   10/13/20 110/72   10/07/20 (!) 148/88     Pulse Readings from Last 3 Encounters:   10/18/20 96   10/13/20 90   10/07/20 80           INTAKE/OUTPUTS:  I/O last 3 completed shifts:   In: 600 [P.O.:600]  Out: -     Intake/Output Summary (Last 24 hours) at 10/18/2020 1146  Last data filed at 10/17/2020 1913  Gross per 24 hour   Intake 240 ml   Output --   Net 240 ml       General Appearance: alert and oriented to person, place and time, well-developed and   well-nourished, in no acute distress   Eyes: pupils equal, round, and reactive to light, extraocular eye movements intact, conjunctivae normal and sclera anicteric   Neck: neck supple and non tender without mass, no thyromegaly, no thyroid nodules and no cervical adenopathy   Pulmonary/Chest:rales   Cardiovascular: normal rate, regular rhythm, normal S1 and S2, no murmurs, rubs, clicks or gallops, distal pulses intact, no carotid bruits, no murmurs, no gallops, no carotid bruits and no JVD   Abdomen: obese, soft, non-tender, non-distended, normal bowel sounds, no masses or organomegaly   Extremities:no edema   Musculoskeletal: normal range of motion, no joint swelling, deformity or tenderness   Neurologic: reflexes normal and symmetric, no cranial nerve deficit noted    LABS/IMAGING:    CBC:  Lab Results   Component Value Date    WBC 12.4 (H) 10/18/2020    HGB 8.9 (L) 10/18/2020    HCT 28.2 (L) 10/18/2020    MCV 83.7 10/18/2020     10/18/2020    LYMPHOPCT 18.2 (L) 10/18/2020    RBC 3.37 (L) 10/18/2020    MCH 26.4 10/18/2020    MCHC 31.6 (L) 10/18/2020    RDW 13.8 10/18/2020    NEUTOPHILPCT 75.7 10/18/2020    MONOPCT 4.8 10/18/2020    BASOPCT 0.1 10/18/2020    NEUTROABS 9.38 (H) 10/18/2020    LYMPHSABS 2.25 10/18/2020    MONOSABS 0.60 10/18/2020    EOSABS 0.00 (L) 10/18/2020    BASOSABS 0.01 10/18/2020       Recent Labs     10/18/20  0628 10/17/20  0652 10/16/20  0458   WBC 12.4* 10.9 11.7*   HGB 8.9* 9.0* 9.8*   HCT 28.2* 29.1* 31.6*   MCV 83.7 83.9 84.7    327 339       BMP:   Recent Labs     10/16/20  0458 10/17/20  0652 10/18/20  0628    137 136   K 4.9 4.7 4.7    103 102   CO2 22 21* 20*   BUN 28* 31* 37*   CREATININE 1.1* 1.2* 1.2*       MG:   Lab Results   Component Value Date    MG 2.2 03/29/2019     Ca/Phos:   Lab Results   Component Value Date    CALCIUM 9.3 10/18/2020     Amylase: No results found for: AMYLASE  Lipase:   Lab Results   Component Value Date    LIPASE 11 (L) 10/07/2020     LIVER PROFILE:   Recent Labs     10/16/20  0458 10/17/20  0652 10/18/20  0628   AST 15 13 14   ALT 8 8 9   BILITOT <0.2 <0.2 <0.2   ALKPHOS 74 65 69       PT/INR: No results for input(s): PROTIME, INR in the last 72 hours. APTT: No results for input(s): APTT in the last 72 hours.     Cardiac Enzymes:  Lab Results   Component Value Date    CKTOTAL 45 02/02/2020    TROPONINI <0.01 10/13/2020                   PROBLEM LIST:  Patient Active Problem List   Diagnosis    Hypertension    Osteoporosis    Primary osteoarthritis involving multiple joints    Closed
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
## 2020-10-18 NOTE — PROGRESS NOTES
Hospital Medicine Progress Note      Date of Admission: 10/13/2020  Hospital day # 5    Course: Pneumonia, dehydration, GILMER    Subjective       Refers feels better  No complaints at time evaluation  Patient currently at room air   Stable overnight. No other overnight issues reported. Exam:    BP (!) 160/78   Pulse 96   Temp 98.2 °F (36.8 °C) (Temporal)   Resp 18   Ht 4' 5\" (1.346 m)   Wt 104 lb (47.2 kg)   SpO2 98%   BMI 26.03 kg/m²     General appearance: Acutely ill, appears stated age and cooperative. HEENT: Pupils equal, round, and reactive to light. Conjunctivae/corneas clear. Neck: Supple. No jugular venous distention. Trachea midline. Respiratory:  .  Cardiovascular: S1/S2  Abdomen: Soft, non-tender, non-distended with normal bowel sounds. Musculoskeletal: No clubbing, cyanosis or edema bilaterally.    Skin:  No rashes    Neurologic: awake, alert and following commands     Medications:  Reviewed    Infusion Medications     Scheduled Medications    cefdinir  300 mg Oral Daily    doxycycline hyclate  100 mg Oral BID    lisinopril  5 mg Oral Daily    mirtazapine  45 mg Oral Nightly    pantoprazole  40 mg Oral QAM AC    sucralfate  1 g Oral 4x Daily    dexamethasone  4 mg Intravenous BID    enoxaparin  30 mg Subcutaneous Daily    polyethylene glycol  17 g Oral Daily    mineral oil-hydrophilic petrolatum   Topical BID     PRN Meds: acetaminophen, diphenhydrAMINE, ipratropium-albuterol, ondansetron, mineral oil-hydrophilic petrolatum **AND** mineral oil-hydrophilic petrolatum    I/O    Intake/Output Summary (Last 24 hours) at 10/18/2020 1000  Last data filed at 10/17/2020 1913  Gross per 24 hour   Intake 240 ml   Output --   Net 240 ml       Labs:   Recent Labs     10/16/20  0458 10/17/20  0652 10/18/20  0628   WBC 11.7* 10.9 12.4*   HGB 9.8* 9.0* 8.9*   HCT 31.6* 29.1* 28.2*    327 361       Recent Labs     10/16/20  0458 10/17/20  0652 10/18/20  0628    137 136   K 4.9 4.7 4.7    103 102   CO2 22 21* 20*   BUN 28* 31* 37*   CREATININE 1.1* 1.2* 1.2*   CALCIUM 9.6 9.2 9.3       Recent Labs     10/16/20  0458 10/17/20  0652 10/18/20  0628   PROT 6.9 6.7 6.5   ALKPHOS 74 65 69   ALT 8 8 9   AST 15 13 14   BILITOT <0.2 <0.2 <0.2       No results for input(s): INR in the last 72 hours. No results for input(s): [place] in the last 72 hours. Other labs:  Lab Results   Component Value Date    CHOL 175 10/27/2018    TRIG 95 10/27/2018    HDL 65 10/27/2018    LDLCALC 91 10/27/2018    TSH 2.580 10/27/2018    INR 1.0 05/23/2019    LABA1C 6.1 (H) 08/30/2017       Radiology:  Imaging studies reviewed today. ASSESSMENT:    Pneumonia  Poss UIP  Pulmonary fibrosis  Hypertension  GILMER      PLAN:    Cefdinir  Doxycycline  Nebulizer treatments  COVID-19 test negative  IV fluids  Follow electrolytes  Follow BUN/creatinine  Patient has papers for DNR CC signed by POA  Patient and family have decided for nursing facility  Disposition, nursing facility once precert completed      Diet: DIET NO SALT ADDED (3-4 GM); Dietary Nutrition Supplements: Standard High Calorie Oral Supplement  Code Status: DNR-CC    PT/OT Eval Status: [x] Ordered [] Evaluation noted [] Not applicable    DVT Prophylaxis: [x]Lovenox []Heparin []PCD [] [address] []Encouraged ambulation []N/A    Likely disposition when able:  [x]Home [] Home with [facility] [] SNF/JAMES [] Acute Rehab [] LTAC []Other    +++++++++++++++++++++++++++++++++++++++++++++++++  Han Schulz MD, Hospitalist  +++++++++++++++++++++++++++++++++++++++++++++++++  NOTE: This report was transcribed using voice recognition software.  Every effort was made to ensure accuracy; however, inadvertent computerized transcription errors may be present.

## 2020-10-18 NOTE — PLAN OF CARE
Problem: Pain:  Goal: Control of acute pain  Description: Control of acute pain  Outcome: Met This Shift     Problem: Falls - Risk of:  Goal: Will remain free from falls  Description: Will remain free from falls  Outcome: Met This Shift     Problem: Falls - Risk of:  Goal: Absence of physical injury  Description: Absence of physical injury  Outcome: Met This Shift     Problem: Skin Integrity:  Goal: Absence of new skin breakdown  Description: Absence of new skin breakdown  Outcome: Met This Shift

## 2020-10-19 NOTE — CARE COORDINATION
Insurance precert obtained for [employer]. Transportation has been arranged for 6PM, by the facility. VM message left for daughter, Augie Wayne. Nursing notifed.  Jean Marie Armstrong RN BSN

## 2020-10-19 NOTE — DISCHARGE SUMMARY
Hospital Medicine Discharge Summary    Patient ID: Leata Hashimoto   Patient's PCP: Karely Leonardo, APRN - CNP    Admit Date: 10/13/2020   Discharge Date:      Admitting Physician: Jamarcus Lewis DO  Discharge Physician: Rubi Ramirez MD     Discharge Diagnoses:    Pneumonia  Poss UIP  Pulmonary fibrosis  Hypertension  GILMER    Hospital course in brief (Please refer to daily progress notes for a comprehensive review of the hospitalization by requesting medical records)    80 y.o. Flower Evangelista presented to  ER with worsen with shortness of breath and cough going on gt the last few months and she get worse over the last few day. The shortness of breath is progressively gotten ing SOB gworse to the point where she came to the emergency room.  CT scan shows classic honey combing and traction bronchiectasis fibrosis and possible pneumonia. Patient with possible UIP. Patient seen by pulmonology. Patient has clinically improved. Please see consultations for details.       PHYSICAL EXAM:  BP (!) 143/68   Pulse 76   Temp 96.8 °F (36 °C) (Temporal)   Resp 18   Ht 4' 5\" (1.346 m)   Wt 104 lb (47.2 kg)   SpO2 95%   BMI 26.03 kg/m²     General appearance: No apparent distress appears stated age and cooperative. HEENT Normal cephalic, atraumatic without obvious deformity. Pupils equal, round, and reactive to light. Extra ocular muscles intact. Conjunctivae/corneas clear. Neck: Supple, No jugular venous distention/bruits. Trachea midline without thyromegaly or adenopathy with full range of motion. Lungs: Clear to auscultation, bilaterally without Rales/Wheezes/Rhonchi with good respiratory effort. Heart: S1/S2   Abdomen: Soft, non-tender or non-distended without rigidity or guarding and positive bowel sounds all four quadrants. Extremities: No clubbing, cyanosis, or edema bilaterally. Skin: Skin color, texture, turgor normal.  No rashes or lesions.   Neurologic: Alert and oriented X 3  Mental status: Alert, oriented, thought content appropriate. Consults:   IP CONSULT TO PULMONOLOGY    Discharge Instructions / Follow up:  PCP, Pulmonology    Activity: activity as tolerated    Significant labs:    Labs: For convenience and continuity at follow-up the following most recent labs are provided:  CBC:   Recent Labs     10/17/20  0652 10/18/20  0628 10/19/20  0636   WBC 10.9 12.4* 12.1*   RBC 3.47* 3.37* 3.52   HGB 9.0* 8.9* 9.1*   HCT 29.1* 28.2* 29.3*   MCV 83.9 83.7 83.2   RDW 13.9 13.8 14.0    361 362     BMP:   Recent Labs     10/17/20  0652 10/18/20  0628 10/19/20  0636    136 138   K 4.7 4.7 4.5    102 103   CO2 21* 20* 23   BUN 31* 37* 43*   CREATININE 1.2* 1.2* 1.2*     LFT:  Recent Labs     10/17/20  0652 10/18/20  0628 10/19/20  0636   PROT 6.7 6.5 6.4   ALKPHOS 65 69 72   ALT 8 9 15   AST 13 14 16   BILITOT <0.2 <0.2 <0.2     PT/INR: No results for input(s): INR, APTT in the last 72 hours. BNP: No results for input(s): BNP in the last 72 hours. Hgb A1C:   Lab Results   Component Value Date    LABA1C 6.1 (H) 08/30/2017     Folate and B12:   Lab Results   Component Value Date    YWAGRMFC40 >2000 (H) 08/31/2017   ,   Lab Results   Component Value Date    FOLATE 20.0 08/31/2017     Thyroid Studies:   Lab Results   Component Value Date    TSH 2.580 10/27/2018       Urinalysis:    Lab Results   Component Value Date    NITRU Negative 10/07/2020    WBCUA 5-10 10/07/2020    BACTERIA MANY 10/07/2020    RBCUA NONE 10/07/2020    BLOODU Negative 10/07/2020    SPECGRAV 1.010 10/07/2020    GLUCOSEU Negative 10/07/2020       Imaging:  Ct Abdomen Pelvis Wo Contrast Additional Contrast? None    Result Date: 10/7/2020  EXAMINATION: CT OF THE ABDOMEN AND PELVIS WITHOUT CONTRAST 10/7/2020 1:30 pm TECHNIQUE: CT of the abdomen and pelvis was performed without the administration of intravenous contrast. Multiplanar reformatted images are provided for review.  Dose modulation, iterative reconstruction, and/or weight based adjustment of the mA/kV was utilized to reduce the radiation dose to as low as reasonably achievable. COMPARISON: 07/20/2020 HISTORY: ORDERING SYSTEM PROVIDED HISTORY: right sided abdominal pain and flank pain TECHNOLOGIST PROVIDED HISTORY: Reason for exam:->right sided abdominal pain and flank pain Additional Contrast?->None What reading provider will be dictating this exam?->CRC FINDINGS: Lower Chest: Images through the lung bases demonstrate findings of interstitial fibrosis. There are superimposed infiltrates within the lung bases worrisome for viral pneumonia. Organs: Multiple hepatic cysts are noted. Spleen, pancreas and adrenal glands are unremarkable. There is no right obstructive uropathy. There are 2 right renal cysts. 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 Blanchie Bevels GI/Bowel: Multiple less than 5 mm layering stones are seen within the gallbladder lumen. There is no evidence of acute cholecystitis. There is a large hiatal hernia. There is no small bowel obstruction. There is mild retention of stool within the colon. There are no findings of a colonic mass or stricture. There are no findings of diverticulitis. No right lower quadrant inflammatory changes are noted. Pelvis:  Bladder is unremarkable in appearance. Peritoneum/Retroperitoneum: There are multiple calcified lymph nodes seen within the mesentery. There is no pathological lymphadenopathy. Bones/Soft Tissues:  Age related degenerative changes of the visualized osseous structures without focal destructive lesion. 1. Chronic interstitial fibrosis seen within the lung bases. There are superimposed ground-glass infiltrates seen within both the right and left lung bases. These findings can represent atypical pneumonia or viral pneumonia such as COVID-19. 2. Large hiatal hernia 3. Cardiomegaly. There is no pericardial effusion. 4. There is no acute intra-abdominal or intrapelvic pathology. 5. Cholelithiasis. Ct Chest Wo Contrast    Result Date: 10/13/2020  EXAMINATION: CT OF THE CHEST WITHOUT CONTRAST 10/13/2020 6:10 pm TECHNIQUE: CT of the chest was performed without the administration of intravenous contrast. Multiplanar reformatted images are provided for review. Dose modulation, iterative reconstruction, and/or weight based adjustment of the mA/kV was utilized to reduce the radiation dose to as low as reasonably achievable. COMPARISON: None. HISTORY: ORDERING SYSTEM PROVIDED HISTORY: SOB TECHNOLOGIST PROVIDED HISTORY: Reason for exam:->SOB What reading provider will be dictating this exam?->CRC FINDINGS: There is cardiomegaly with coronary artery calcification. The great vessels are normal.  Nonspecific mediastinal lymph nodes are noted. The trachea and major bronchi are patent. There is interstitial lung disease with pulmonary fibrosis and honeycombing. Patchy and multifocal ground-glass opacities are identified in the lungs bilaterally. Bronchiectasis is noted in the lower lobes. Multiple ill-defined low-attenuation hepatic lesions are identified, the largest 1 in the dome measuring 4 x 3.2 cm. Gallstones are identified in the gallbladder. Large hiatal hernia is noted. There is multiple compression fractures of the thoracic spine with vertebroplasty and kyphosis. Advanced pulmonary fibrosis with  honeycombing, bronchiectasis with superimposed multifocal infiltrates and ground-glass opacities concerning for multifocal pneumonia including viral infection and/or edema. Surveillance recommended. Us Gallbladder Ruq    Result Date: 10/7/2020  EXAMINATION: RIGHT UPPER QUADRANT ULTRASOUND 10/7/2020 4:56 pm COMPARISON: None. HISTORY: ORDERING SYSTEM PROVIDED HISTORY: Cholelithiasis TECHNOLOGIST PROVIDED HISTORY: Reason for exam:->Cholelithiasis What reading provider will be dictating this exam?->CRC FINDINGS: LIVER:  The visualized portion of the liver is unremarkable.   This was limited study of the origin. No consolidation, effusion or pneumothorax. Stable cardiomediastinal silhouette. Stable chronic fibrotic changes. No acute process. Discharge Medications:      Medication List      START taking these medications    ipratropium-albuterol 0.5-2.5 (3) MG/3ML Soln nebulizer solution  Commonly known as:  DUONEB  Inhale 3 mLs into the lungs every 4 hours as needed for Shortness of Breath     predniSONE 10 MG tablet  Commonly known as:  DELTASONE  Take 3 tablets by mouth See Admin Instructions for 3 days, THEN 2 tablets See Admin Instructions for 3 days, THEN 1 tablet See Admin Instructions for 3 days.   Start taking on:  October 15, 2020        CONTINUE taking these medications    acetaminophen 500 MG tablet  Commonly known as:  TYLENOL     alendronate 70 MG tablet  Commonly known as:  FOSAMAX  Take 1 tablet by mouth every 7 days     cefdinir 300 MG capsule  Commonly known as:  OMNICEF  Take 1 capsule by mouth 2 times daily for 7 days     diphenhydrAMINE 25 MG capsule  Commonly known as:  BENADRYL     lisinopril 10 MG tablet  Commonly known as:  PRINIVIL;ZESTRIL     mirtazapine 45 MG tablet  Commonly known as:  REMERON  Take 1 tablet by mouth nightly     pantoprazole 40 MG tablet  Commonly known as:  PROTONIX  Take 1 tablet by mouth every morning (before breakfast)     polyethylene glycol 17 GM/SCOOP powder  Commonly known as:  GLYCOLAX  Take 17 g by mouth daily     sucralfate 1 GM tablet  Commonly known as:  CARAFATE  Take 1 tablet by mouth 4 times daily           Where to Get Your Medications      These medications were sent to [facility], [address] [address]    Phone:  [phone]   · cefdinir 300 MG capsule  · ipratropium-albuterol 0.5-2.5 (3) MG/3ML Soln nebulizer solution     You can get these medications from any pharmacy    Bring a paper prescription for each of these medications  · predniSONE 10 MG tablet           Condition at discharge: Stable     Disposition:  [address]    Thank you for the opportunity to be involved in this patient's care. If you have any questions or concerns please feel free to contact me. Time Spent on discharge is more than 35 minutes in the examination, evaluation, counseling and review of medications and discharge plan.    +++++++++++++++++++++++++++++++++++++++++++++++++  Rubi Ramirez MD, Hospitalist  +++++++++++++++++++++++++++++++++++++++++++++++++  NOTE: This report was transcribed using voice recognition software. Every effort was made to ensure accuracy; however, inadvertent computerized transcription errors may be present.

## 2020-10-19 NOTE — DISCHARGE INSTR - COC
By    None active    Resolved    COVID-19 Rule Out 10/13/20 10/13/20 10/13/20 COVID-19 (Ordered)   10/13/20 Rule-Out Test Resulted    COVID-19 Rule Out 10/07/20 10/07/20 10/07/20 COVID-19 (Ordered)   10/07/20 Rule-Out Test Resulted    ESBL (Extended Spectrum Beta Lactamase) 05/24/19 05/26/19 05/24/19 Urine culture   02/05/20 Jose Rafael Boone RN    E coli Urine 5/24/19    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 06/28/18 Jose Rafael Boone RN   05/28/19 Jose Rafael Boone, RN    Proteus mirabilis Urine 6/25/2018,6/28/2018          Nurse Assessment:  Last Vital Signs: BP (!) 143/68   Pulse 76   Temp 96.8 °F (36 °C) (Temporal)   Resp 18   Ht 4' 5\" (1.346 m)   Wt 104 lb (47.2 kg)   SpO2 95%   BMI 26.03 kg/m²     Last documented pain score (0-10 scale): Pain Level: 0  Last Weight:   Wt Readings from Last 1 Encounters:   10/13/20 104 lb (47.2 kg)     Mental Status:  oriented, alert and able to concentrate and follow conversation    IV Access:  - None    Nursing Mobility/ADLs:  Walking   Assisted  Transfer  Assisted  Bathing  Assisted  Dressing  Assisted  Toileting  Assisted  Feeding  Independent  Med Admin  Independent  Med Delivery   whole    Wound Care Documentation and Therapy:  Wound 08/31/17 Back Mid (Active)   Number of days: 1144       Incision 12/20/17 Back Mid;Lower (Active)   Number of days: 1033       Wound 03/29/19 Leg Left;Upper;Posterior just below left buutock (Active)   Number of days: 570       Wound 10/14/20 Coccyx Right (Active)   Wound Image   10/14/20 0923   Wound Etiology Deep tissue/Injury 10/14/20 7745   Dressing/Treatment Pharmaceutical agent (see MAR); Open to air; Other (comment) 10/18/20 2332   Wound Length (cm) 0.8 cm 10/14/20 0923   Wound Width (cm) 0.4 cm 10/14/20 0923   Wound Depth (cm) 0.1 cm 10/14/20 0130   Wound Surface Area (cm^2) 0.32 cm^2 10/14/20 0923   Change in Wound Size % (l*w) 42.86 10/14/20 0923   Wound Volume (cm^3) 0.06 cm^3 10/14/20 0130   Wound Assessment Erythema 10/16/20 0930   Drainage Amount None 10/18/20 1500   Number of days: 5        Elimination:  Continence:   · Bowel: Yes  · Bladder: Yes  Urinary Catheter: None   Colostomy/Ileostomy/Ileal Conduit: No       Date of Last BM: 10/19/2020    Intake/Output Summary (Last 24 hours) at 10/19/2020 1133  Last data filed at 10/19/2020 0604  Gross per 24 hour   Intake 440 ml   Output --   Net 440 ml     I/O last 3 completed shifts: In: 12 [P.O.:560]  Out: -     Safety Concerns: At Risk for Falls    Impairments/Disabilities:      Hearing    Nutrition Therapy:  Current Nutrition Therapy:   - Oral Diet:  Low Sodium (2gm) and high calorie oral supplement     Routes of Feeding: Oral  Liquids: No Restrictions  Daily Fluid Restriction: no  Last Modified Barium Swallow with Video (Video Swallowing Test): not done    Treatments at the Time of Hospital Discharge:   Respiratory Treatments: ***  Oxygen Therapy:  is not on home oxygen therapy.   Ventilator:    - No ventilator support    Rehab Therapies: Physical Therapy and Occupational Therapy  Weight Bearing Status/Restrictions: No weight bearing restirctions  Other Medical Equipment (for information only, NOT a DME order):  cane, walker and hospital bed  Other Treatments: ***    Patient's personal belongings (please select all that are sent with patient):  None    RN SIGNATURE:  Electronically signed by Omar Jo on 10/19/20 at 11:40 AM EDT    CASE MANAGEMENT/SOCIAL WORK SECTION    Inpatient Status Date: ***    Readmission Risk Assessment Score:  Readmission Risk              Risk of Unplanned Readmission:        21           Discharging to Facility/ Agency   · Name:   · Address:  · Phone:  · Fax:    Dialysis Facility (if applicable)   · Name:  · Address:  · Dialysis Schedule:  · Phone:  · Fax:    / signature: {Esignature:[id number]}    PHYSICIAN SECTION    Prognosis: {Prognosis:2295929887}    Condition at Discharge: 50Selvin Plummer Patient Condition:321966018}    Rehab Potential (if transferring to Rehab): {Prognosis:1219267271}    Recommended Labs or Other Treatments After Discharge: ***    Physician Certification: I certify the above information and transfer of Yin Garcia  is necessary for the continuing treatment of the diagnosis listed and that she requires {Admit to Appropriate Level of Care:57205} for {GREATER/LESS:371657366} 30 days.      Update Admission H&P: {CHP DME Changes in FAQ:361798946}    PHYSICIAN SIGNATURE:  {Esignature:[id number]}

## 2020-10-19 NOTE — PROGRESS NOTES
Physical Therapy  Physical Therapy Treatment     Name: Randall Wolf  : 1930  MRN: [id number]    Referring Provider:  Jay Jay Mcleod MD    Date of Service: 10/19/2020    Evaluating PT:  Janessa Busch PT, DPT. [id number]    Room #:  5402/5402-B  Diagnosis:  Pneumonia   Reason for admission:  SOB and cough  Precautions:  Falls, O2  Pertinent PMHx: HTN, arthritis, UTI, dementia, deaf in left ear, osteoporosis, neuromuscular disorder  Procedures: none  Equipment Recommendations:  FWW    SUBJECTIVE:  Pt lives at assisted apartment with elevator access and aides available to help. Pt ambulated with [employer] PTA. OBJECTIVE:   Initial Evaluation  Date: 10/15 Treatment  10/19   Short Term/ Long Term   Goals   AM-PAC 6 Clicks 63/86     Was pt agreeable to Eval/treatment? yes yes    Does pt have pain? denies No     Bed Mobility  Rolling: NT  Supine to sit: SBA  Sit to supine: SBA  Scooting: SBA Rolling sba  Supine to sit min  Sit to supine nt  Scooting sba Independent    Transfers Sit to stand: SBA  Stand to sit: SBA  Stand pivot: NT Sit to stand min  Stand to sit min  Stand pivot ww min Independent    Ambulation    side steps at EOB with [employer] SBA   70 feet ww min assist  >100 feet with [employer] Supervision    Stair negotiation: ascended and descended  NT NT TBD   ROM BUE:  See OT eval   BLE:  WFL     Strength BUE:  See OT eval   BLE:  knee ext 4/5  Ankle DF 4/5  Increase by 1/3 MMT grade    Balance Sitting EOB:  SBA  Dynamic Standing:  SBA   Sitting eob supervision  Dynamic standing min with ww  Sitting EOB:  Independent   Dynamic Standing:   Mod I     -Pt is A & O x 3  -Sensation:  unremarkable   -Edema:  unremarkable     Therapeutic Exercises:  functional activity , laq, ankle pumps, hip flexion, ue rom and cervical rom    Patient education  Pt educated on safety, sequencing of transfers  Patient response to education:   Pt verbalized understanding Pt demonstrated skill Pt requires further education in this area yes yes reinforce     ASSESSMENT:    Comments:  Pt received reclined supine and agreeable to PT session  Pt in bed upon arrival.   Pt required light assist for uprighting trunk. Upon sitting eob pt reports she was dizzy which passed with sitting on eob approx 5 min. With sit to stand pt unsteady. Pt ambulated 70 feet with 1 standing rest and pt coughing a lot through out gait. Pt sob with activity and pulse ox was 88 % on room air and increased to 94 % with short rest.  Pt sat in chair and performed therapeutic ex to prevent stiffness and help prevent blood clots. Pt educated on hand placement with transfers and importance of moving LEGs and doing ankle pumps while up in the chair. Pt given call light and instructed to call nurse to have assist getting into bed. Pt mildly unsteady with ambulation. Pt with all needs met and call light in reach. Pt would benefit from continued PT POC to address functional deficits described above. Treatment:  Patient practiced and was instructed in the following treatment:     Patient education as per above    Bed mobility training - pt given verbal and tactile cues to facilitate proper sequencing and safety during rolling and supine>sit   STS and pivot transfer training - pt educated on proper hand and foot placement, safety and sequencing, and use of proper technique to safely complete sit<>stand and pivot transfers    Pt's/ family goals   1. None stated    Patient and or family understand(s) diagnosis, prognosis, and plan of care. yes    PLAN:    Current Treatment Recommendations   [x] Strengthening     [] ROM   [x] Balance Training   [x] Endurance Training   [x] Transfer Training   [x] Gait Training   [] Stair Training   [] Positioning   [x] Safety and Education Training   [] Patient/Caregiver Education   [] HEP  [] Other     Frequency of treatments: 2-5x/week x 1-2 weeks.     Time in  905   Time out  929    Total Treatment Time 24 minutes Evaluation Time includes thorough review of current medical information, gathering information on past medical history/social history and prior level of function, completion of standardized testing/informal observation of tasks, assessment of data and education on plan of care and goals.     CPT codes:  [] Low Complexity PT evaluation 94603  [] Moderate Complexity PT evaluation 53258  [] High Complexity PT evaluation 38171  [] PT Re-evaluation 54207  [] Gait training 03212 - minutes  [] Manual therapy 28208 - minutes  [x] Therapeutic activities 12411 24 minutes  [] Therapeutic exercises 38058 - minutes  [] Neuromuscular reeducation 52567 - minutes     Maninder Alfred, PTA  [id number]

## 2020-11-10 NOTE — PROGRESS NOTES
Post-Discharge Transitional Care Management Services or Hospital Follow Up      Ofe Taylor   YOB: 1930    Date of Office Visit:  11/10/2020  Date of Hospital Admission: 10/13/20  Date of Hospital Discharge: 10/19/20  Risk of hospital readmission (high >=14%.  Medium >=10%) :Readmission Risk Score: 21      Care management risk score Rising risk (score 2-5) and Complex Care (Scores >=6): 5     Non face to face  following discharge, date last encounter closed (first attempt may have been earlier): *No documented post hospital discharge outreach found in the last 14 days    Call initiated 2 business days of discharge: *No response recorded in the last 14 days    Patient Active Problem List   Diagnosis    Hypertension    Osteoporosis    Primary osteoarthritis involving multiple joints    Closed subluxation of cervical spine    Compression fracture of thoracic spine, non-traumatic ([facility] [address]    Kidney stone    Abdominal pain    Acute blood loss anemia    Acute renal failure superimposed on stage 3 chronic kidney disease (HCC)    Lactic acidosis    GIB (gastrointestinal bleeding)    PUD (peptic ulcer disease)    Moderate protein-calorie malnutrition (HCC)    Pneumonia       Allergies   Allergen Reactions    Penicillins Swelling    Celebrex [Celecoxib] Nausea Only     Headache    Demerol Hcl [Meperidine] Nausea And Vomiting    Dust Mite Extract      Dust, grass, and trees    Percocet [Oxycodone-Acetaminophen] Nausea And Vomiting    Percodan [Oxycodone-Aspirin] Nausea And Vomiting    Propulsid [Cisapride] Other (See Comments)     Headache       Medications listed as ordered at the time of discharge from [facility] Medication Instructions LIANNE:    Printed on:11/10/20 2421   Medication Information                      acetaminophen (TYLENOL) 500 MG tablet  Take 500 mg by mouth every 6 hours as needed for Pain             alendronate (FOSAMAX) 70 MG tablet  Take 1 tablet by mouth every 7 days             diphenhydrAMINE (BENADRYL) 25 MG capsule  Take 25 mg by mouth every 6 hours as needed for Itching             ipratropium-albuterol (DUONEB) 0.5-2.5 (3) MG/3ML SOLN nebulizer solution  Inhale 3 mLs into the lungs every 4 hours as needed for Shortness of Breath             lisinopril (PRINIVIL;ZESTRIL) 10 MG tablet  1/2 tab po daily             mirtazapine (REMERON) 45 MG tablet  Take 1 tablet by mouth nightly             pantoprazole (PROTONIX) 40 MG tablet  Take 1 tablet by mouth every morning (before breakfast)             polyethylene glycol (GLYCOLAX) 17 GM/SCOOP powder  Take 17 g by mouth daily             sucralfate (CARAFATE) 1 GM tablet  Take 1 tablet by mouth 4 times daily                   Medications marked \"taking\" at this time  Outpatient Medications Marked as Taking for the 11/10/20 encounter (Office Visit) with KEVIN Ryan - CNP   Medication Sig Dispense Refill    acetaminophen (TYLENOL) 500 MG tablet Take 500 mg by mouth every 6 hours as needed for Pain      mirtazapine (REMERON) 45 MG tablet Take 1 tablet by mouth nightly 30 tablet 2    sucralfate (CARAFATE) 1 GM tablet Take 1 tablet by mouth 4 times daily 120 tablet 3    diphenhydrAMINE (BENADRYL) 25 MG capsule Take 25 mg by mouth every 6 hours as needed for Itching      lisinopril (PRINIVIL;ZESTRIL) 10 MG tablet 1/2 tab po daily 90 tablet 1    alendronate (FOSAMAX) 70 MG tablet Take 1 tablet by mouth every 7 days 12 tablet 3    polyethylene glycol (GLYCOLAX) 17 GM/SCOOP powder Take 17 g by mouth daily 510 g 5        Medications patient taking as of now reconciled against medications ordered at time of hospital discharge: Yes    Chief Complaint   Patient presents with    Follow-Up from Hospital     dx pneumonia       History of Present illness - Follow up of Hospital diagnosis(es): Bilateral Pneumonia    Inpatient course: Discharge summary reviewed- see chart.     Interval history/Current status: stable/home    Review of Systems   Constitutional: Positive for activity change. Negative for appetite change, chills, diaphoresis, fatigue, fever and unexpected weight change. HENT: Negative for congestion, ear discharge, ear pain, facial swelling, mouth sores, nosebleeds, postnasal drip, rhinorrhea, sneezing, sore throat, trouble swallowing and voice change. Eyes: Negative for photophobia, pain, discharge, redness, itching and visual disturbance. Respiratory: Negative for apnea, cough, choking, chest tightness, shortness of breath, wheezing and stridor. H/o Bilateral Pneumonia   Cardiovascular: Negative for chest pain, palpitations and leg swelling. Gastrointestinal: Negative for abdominal distention, abdominal pain, anal bleeding, blood in stool, constipation, diarrhea, nausea, rectal pain and vomiting. H/o GERD-controlled  H/o Drug induced constipation/fecal impaction-improved on colace   Endocrine: Negative for cold intolerance, heat intolerance, polydipsia, polyphagia and polyuria. Genitourinary: Negative for decreased urine volume, difficulty urinating, dysuria, enuresis, flank pain, frequency, hematuria and urgency. H/o recurrent UTI   Musculoskeletal:        H/o Osteoporosis/compression fracture thoracic spine/chronic thoracic pain   Skin: Negative for color change, pallor and rash. Neurological: Negative for tremors, seizures, facial asymmetry, speech difficulty, light-headedness, numbness and headaches. H/o Syncope/Dizziness   Psychiatric/Behavioral: Negative for dysphoric mood. The patient is not nervous/anxious. H/o Insomnia-improved on Remeron       Vitals:    11/10/20 1551   BP: 138/81   Site: Right Upper Arm   Position: Sitting   Cuff Size: Medium Adult   Pulse: 92   Resp: 20   Temp: 97.3 °F (36.3 °C)   TempSrc: Temporal   SpO2: 95%   Weight: 99 lb 12.8 oz (45.3 kg)   Height: 4' 5\" (1.346 m)     Body mass index is 24.98 kg/m².    Wt Readings from Last 3 Encounters:   11/10/20 99 lb 12.8 oz (45.3 kg)   10/13/20 104 lb (47.2 kg)   10/13/20 104 lb (47.2 kg)     BP Readings from Last 3 Encounters:   11/10/20 138/81   10/19/20 114/60   10/13/20 110/72        Physical Exam:  General Appearance: alert and oriented to person, place and time and frail-appearing  Skin: warm and dry, no rash or erythema  Head: normocephalic and atraumatic  Eyes: pupils equal, round, and reactive to light, extraocular eye movements intact, conjunctivae normal  ENT: tympanic membrane, external ear and ear canal normal bilaterally, oropharynx clear and moist with normal mucous membranes  Neck: neck supple and non tender without mass, no thyromegaly or thyroid nodules, no cervical lymphadenopathy   Pulmonary/Chest: clear to auscultation bilaterally- no wheezes, rales or rhonchi, normal air movement, no respiratory distress  Cardiovascular: normal rate, normal S1 and S2, no gallops, intact distal pulses and no carotid bruits  Abdomen: soft, non-tender, non-distended  Extremities: no cyanosis and no clubbing  Musculoskeletal:Moderate tenderness present to thoracic/lumbar spine and bilateral paraspinal muscles, requires walker to ambulate, physical deconditioning and muscle atrophy    Assessment/Plan:  1. Pneumonia of both lungs due to infectious organism, unspecified part of lung-Improved  Nebulizer machine ordered  Use IS as discussed  - DME Order for Nebulizer as OP  - [place] DISCHARGE MEDS RECONCILED W/ CURRENT OUTPATIENT MED LIST    2. Chronic midline thoracic back pain  Continue Tylenol # 3  OARRS report reviewed  - acetaminophen-codeine (TYLENOL/CODEINE #3) 300-30 MG per tablet; Take 1 tablet by mouth every 6 hours as needed for Pain for up to 7 days. Intended supply: 7 days. Take lowest dose possible to manage pain  Dispense: 28 tablet; Refill: 0  - SC DISCHARGE MEDS RECONCILED W/ CURRENT OUTPATIENT MED LIST    3.  Need for influenza vaccination  - TRISTAN Wagner, [address] =, IM, PF, PREFILL SYR, 0.5ML (FLUAD)  - [place] DISCHARGE MEDS RECONCILED W/ CURRENT OUTPATIENT MED LIST        Medical Decision Making: moderate complexity      Controlled Substance Monitoring:    Acute and Chronic Pain Monitoring:   RX Monitoring 11/10/2020   Attestation -   Periodic Controlled Substance Monitoring Possible medication side effects, risk of tolerance/dependence & alternative treatments discussed. ;No signs of potential drug abuse or diversion identified. ;Assessed functional status.

## 2020-11-16 NOTE — PROGRESS NOTES
Department of Palliative Medicine  Virtual visit telephone encounter  Provider: Ada Woods MD      Location [facility]  Chief Complaint: Linda Mendoza is a 80 y.o. female with chief complaint of Pain, Weakness    HPI:  Linda Mendoza is a 80 y.o. female with significant past medical history of mild senile dementia, history if falls resulting in spinal fracture s/p kyphoplasty, osteoarthritis, and peptic ulcer disease, with several hospitalizations for GIB and anemia, who was referred to [address] [place] [address]. Assessment/Plan      Age related physical debility/falls:              -  Encouraged home safety              -  Has home care aids              -  Recently worked with in home PT              -  Encouraged to maintain current level of activity as tolerated              -  Goal is primarily to maintain comfort and quality of life     Chronic pain syndrome:              -  R/t osteoarthritis and hx of spinal compression fractures              -  Tylenol with codeine is being prescribed by her PCP   - Stopped using Norco              -  Wears Lidocaine patches on her knees                Dementia:                     -  Daughter seems forgetfulness has been increasing     Insomnia:                     -  Remeron 45 mg HS     Constipation:              -  Add Miralax 17 gm daily     Decreased oral intake:              -  Daughter is monitoring weight and weight has remained stable (104 lbs)              -  Encouraged continued use of ensure, using 1-2 per day              -  Has prepared meals              -  Has assistance with meals       Follow Up: PRN. They were encouraged to call with any questions, concerns, needs, or changes in symptoms. Subjective:       Linda Mendoza is a 80 y.o. female was spoken with over the phone. Her daughter Rio Olson was present as well, she was speaking for her mother whenever she was not able to say anything.   She told me that she has not use the Norco for the past month since she has been discharged from the hospital.  Her PCP prescribed her Tylenol with codeine. I told her that if her PCP is writing for Tylenol with codeine that she they should continue it. She denies any nausea vomiting shortness of breath or constipation. She says that the Remeron is helping her significantly fall asleep, currently on 45 mg.  I told him that there will be one less physician following them, they are welcome to call for any questions. Objective:     Physical Exam  There were no vitals taken for this visit. [place] Symptom Assessment Score   [place] Score 8/25/2020 5/20/2020 3/3/2020   Pain Score Worst possible pain 5 5   Tiredness Score Worst possible tiredness 6 5   Nausea Score Not nauseated Not nauseated 1   Depression Score 5 Not depressed 1   Anxiety Score 5 1 1   Drowsiness Score Not drowsy 5 5   Appetite Score 2 6 5   Wellbeing Score 5 4 3   Dyspnea Score 5 No shortness of breath 3   Other Problem Score 2 3 3   Total Assessment Score(calculated) 44 30 32     Assessed by: family. Current Medications:  Medications reviewed: yes    Controlled Substances Monitoring: OARRS reviewed 11/16/20. RX Monitoring 11/10/2020   Attestation -   Periodic Controlled Substance Monitoring Possible medication side effects, risk of tolerance/dependence & alternative treatments discussed. ;No signs of potential drug abuse or diversion identified. ;Assessed functional status. Terri Sebastian MD  Palliative Care Department     Time/Communication  Greater than 50% of time spent, total 20 minutes over the phone counseling and coordination of care regarding symptom management.

## 2020-11-18 PROBLEM — A41.9 SEPSIS (HCC): Status: ACTIVE | Noted: 2020-01-01

## 2020-11-18 NOTE — ED NOTES
Patient swatting at this RN with blood draw x 2 attempts, begging me to stop and stating \"let me die, Im 80: This RN notified Dr. Anne Flores of same. Dr. Anne Flores attempted to contact daughter for further direction on Plan of care. Daughter to call ER. COVID swab obtained, CT notified patient is ready.      Martin Grullon RN  11/18/20 4958

## 2020-11-18 NOTE — ED NOTES
Dr. Holli Flores spoke to daughter,  RE: Plan of care. Per their conversation, pt [facility]. Daughter would like basic labs and fluids/ATB if needed and referral to [facility] in effort to support patient wishes/ Palliative Care notes.      Taras Delgado, RN  11/18/20 1568

## 2020-11-19 PROBLEM — R41.82 ALTERED MENTAL STATUS: Status: ACTIVE | Noted: 2020-01-01

## 2020-11-19 PROBLEM — F03.90 DEMENTIA (HCC): Status: ACTIVE | Noted: 2020-01-01

## 2020-11-19 PROBLEM — N18.9 ACUTE KIDNEY INJURY SUPERIMPOSED ON CKD (HCC): Status: ACTIVE | Noted: 2019-03-29

## 2020-11-19 PROBLEM — E87.5 HYPERKALEMIA: Status: ACTIVE | Noted: 2020-01-01

## 2020-11-19 PROBLEM — R53.1 GENERALIZED WEAKNESS: Status: ACTIVE | Noted: 2020-01-01

## 2020-11-19 PROBLEM — Z90.5 H/O LEFT NEPHRECTOMY: Status: ACTIVE | Noted: 2020-01-01

## 2020-11-19 PROBLEM — I95.9 HYPOTENSION: Status: ACTIVE | Noted: 2020-01-01
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
## 2020-11-19 NOTE — PROGRESS NOTES
Daughter Morgan Mormon called with an update on patient status.
IV was infiltrated. Per patient, she doesn't want another IV placed. Also, patient notified that a urine specimen is needed. She tried urinating multiple times on bed pan. She was unsuccessful. Will try again soon. Dr. Jacklyn Zuniga aware of elevated blood pressure.
Nurse to nurse called to [employer] [employer]. All questions answered. Malena De Jesus faxed to [phone].
Patient is refusing at this time a new IV site. Educated patient on the importance ov having IV access.
Unable to complete med rec, as patient is not aware of meds/dosages she takes at home. Will notify sound.
Urine specimen sent down to lab.
extract; Percocet [oxycodone-acetaminophen]; Percodan [oxycodone-aspirin]; and Propulsid [cisapride]    Family Goal: ECF Placement. Comfort care    Meeting held with spoke with daughter Juany Langley. Referral received. Chart reviewed with [facility] CNP. Received terminal diagnosis of Severe Sepsis. Patient has started treatment for UTI. WBC on 11/19/20 is 6.4 and 7.6 on 11/18/20. Temperature 96.7 HR 85 /70 RR 18. Patient also has mild senile dementia however does not meet Medicare guidelines for End Stage Dementia. Patient is alert and oriented with intermittent periods of confusion. Patient stated she walks with a walker and can dress herself and bath with little assistance. Patient states she could not eat much breakfast today because she does not have the dentures at the hospital. Spoke with daughter Juany Langley over phone. Explained hospice philosophy and benefit. Explained that at this time, patient does not meet Medicare guidelines for hospice care. Daughter stated she would like patient to still discharge to [facility]. Patient has caregivers in the home 23 hours a week and daughter expressed that she did not want patient to be by herself. Answered all questions relating to hospice care. Explained to daughter that if/when patient declines further, she can contact [facility] to evaluate patient again for hospice care. Naominathaniel Warrenryley verbalized understanding. Emotional support and active listening provided.  Claudia Doan RN CM and Flako Gregory.    Discharge Plan:  Discharge Disposition; ECF  Facility Name: [facility]    Electronically signed by Rell Ramirez RN on 11/19/2020 at 11:45 AM

## 2020-11-19 NOTE — DISCHARGE INSTR - COC
/Continuity of Care Form    Patient Name: Anthony Watson  :  1930  MRN:  [id number]    Admit date:  2020  Discharge date:  ***2020    Code Status Order: DNR-CC   Advance Directives:   [facility] Documentation     Date/Time Healthcare Directive Type of Healthcare Directive Copy in [address] Agent's Name Healthcare Agent's Phone Number    20 [phone]  Yes, patient has an advance directive for healthcare treatment  Durable power of  for health care  Other (Comment)  --  --  --          Admitting Physician:  Dank Botello MD  PCP: KEVIN Cruz - CNP    Discharging Nurse: [facility] Unit/Room#: 2694/7987-B  Discharging Unit Phone Number: [phone]    Emergency Contact:   Extended Emergency Contact Information  Primary Emergency Contact: Bill Jimenez  Address: [address] [place] [address], [address] [place] [address] [address] [facility] Phone: [phone]  Work Phone: [phone]  Relation: Child  Secondary Emergency Contact: Yumiko Brantley  Address: [address] [facility] Phone: [phone]  Mobile Phone: [phone]  Relation: Grandchild    Past Surgical History:  Past Surgical History:   Procedure Laterality Date    [facility]    bladder suspension x4    [address], 2001    negative   [facility], , ,     Dr. Gardner  most recently   [address], [address] [address]      cataract bilarteral [address] KYPHOPLASTY  2017    kyphoplasty T8 with bone biospy and epidural injection     FIXATION KYPHOPLASTY  2017    T9 WITH BONE BIOPSY - [address]    double, Dr Abdiel Monroy Right     knee    MANDIBLE SURGERY Bilateral [facility] Left 1939    [place] CYSTOSCOPY,INSERT URETERAL STENT N/A 6/27/2018    CYSTOSCOPY RETROGRADE PYELOGRAM STENT INSERTION performed by Vidal Adkins MD at [address] Right 7/25/2018    CYSTOSCOPY, RIGHT UTEREROSCOPY,  STENT REMOVAL WITH STONE BASKET EXTRACTION performed by Geremias Lorenzo DO at [address]    TYMPANOSTOMY TUBE PLACEMENT  2000    UPPER GASTROINTESTINAL ENDOSCOPY  2003    UPPER GASTROINTESTINAL ENDOSCOPY N/A 3/29/2019    EGD BIOPSY performed by Stevie Santoyo MD at [address] [facility]       Immunization History:   Immunization History   Administered Date(s) Administered    Influenza Virus Vaccine 09/29/2015    Influenza, High Dose (Fluzone 65 yrs and older) 09/05/2016, 10/09/2017, 09/20/2018    Influenza, Quadv, adjuvanted, 65 yrs +, IM, PF (Fluad) 11/10/2020    Influenza, Triv, inactivated, subunit, adjuvanted, IM (Fluad 65 yrs and older) 02/11/2020    Pneumococcal Conjugate 13-valent (Rimgyoq05) 09/29/2015    Pneumococcal Polysaccharide (Htlcgevma33) 09/24/2012, 02/11/2020    Zoster Live (Zostavax) 12/30/2014       Active Problems:  Patient Active Problem List   Diagnosis Code    Anemia D64.9    Hypertension I10    Osteoporosis M81.0    Primary osteoarthritis involving multiple joints M89.49    Closed subluxation of cervical spine S13.100A    Compression fracture of thoracic spine, non-traumatic (HCC) M48.54XA    Kidney stone N20.0    Abdominal pain R10.9    Acute blood loss anemia D62    Acute kidney injury superimposed on CKD ([facility]) N17.9, N18.9    Lactic acidosis E87.2    GIB (gastrointestinal bleeding) K92.2    PUD (peptic ulcer disease) K27.9    Moderate protein-calorie malnutrition (HCC) E44.0    Pneumonia J18.9    Sepsis ([facility]) A41.9    Altered mental status R41.82    Generalized weakness R53.1    Hypotension I95.9    Hyperkalemia E87.5    Solitary kidney Q60.0    H/O left nephrectomy Z90.5    Dementia ([address] F03.90       Isolation/Infection:   Isolation          No Isolation        Patient Infection Status     Infection Onset Added Last Indicated Last Indicated By Review Planned Expiration Resolved Resolved By    None active    Resolved    COVID-19 Rule Out 11/18/20 11/18/20 11/18/20 Respiratory Panel, Molecular, with COVID-19 (Restricted: peds pts or suitable admitted adults) (Ordered)   11/18/20 Rule-Out Test Resulted    COVID-19 Rule Out 10/13/20 10/13/20 10/13/20 COVID-19 (Ordered)   10/13/20 Rule-Out Test Resulted    COVID-19 Rule Out 10/07/20 10/07/20 10/07/20 COVID-19 (Ordered)   10/07/20 Rule-Out Test Resulted    ESBL (Extended Spectrum Beta Lactamase) 05/24/19 05/26/19 05/24/19 Urine culture   02/05/20 Thiago Maher RN    E coli Urine 5/24/19    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 06/28/18 Thiago Maher RN   05/28/19 Thiago Maher RN    Proteus mirabilis Urine 6/25/2018,6/28/2018          Nurse Assessment:  Last Vital Signs: /70   Pulse 85   Temp 96.7 °F (35.9 °C) (Temporal)   Resp 18   Ht 4' 5\" (1.346 m)   Wt 99 lb 3.2 oz (45 kg)   SpO2 95%   BMI 24.83 kg/m²     Last documented pain score (0-10 scale): Pain Level: 0  Last Weight:   Wt Readings from Last 1 Encounters:   11/19/20 99 lb 3.2 oz (45 kg)     Mental Status:  oriented, alert, coherent, thought processes intact and able to concentrate and follow conversation    IV Access:  - None    Nursing Mobility/ADLs:  Walking   Assisted  Transfer  Assisted  Bathing  Assisted  Dressing  Assisted  Toileting  Assisted  Feeding  Independent  Med Admin  Dependent  Med Delivery   whole    Wound Care Documentation and Therapy:  Wound 08/31/17 Back Mid (Active)   Number of days: 1176       Incision 12/20/17 Back Mid;Lower (Active)   Number of days: 1065       Wound 03/29/19 Leg Left;Upper;Posterior just below left buutock (Active)   Number of days: 601       Wound 10/14/20 Coccyx Right (Active)   Wound Image   10/14/20 0923   Wound Etiology Deep tissue/Injury 10/14/20 0923   Dressing/Treatment Open to air 11/19/20 0840   Wound Length (cm) 0.8 cm 10/14/20 0923   Wound Width (cm) 0.4 cm 10/14/20 0923   Wound Depth (cm) 0.1 cm 10/14/20 0130   Wound Surface Area (cm^2) 0.32 cm^2 10/14/20 0923   Change in Wound Size % (l*w) 42.86 10/14/20 0923   Wound Volume (cm^3) 0.06 cm^3 10/14/20 0130   Wound Assessment Erythema 10/16/20 0930   Drainage Amount None 10/18/20 1500   Number of days: 36        Elimination:  Continence:   · Bowel: Yes  · Bladder: Yes  Urinary Catheter: None   Colostomy/Ileostomy/Ileal Conduit: No       Date of Last BM: ***    Intake/Output Summary (Last 24 hours) at 11/19/2020 1458  Last data filed at 11/19/2020 0615  Gross per 24 hour   Intake 80 ml   Output --   Net 80 ml     I/O last 3 completed shifts: In: [de-identified] [P.O.:80]  Out: -     Safety Concerns: At Risk for Falls    Impairments/Disabilities:      Vision    Nutrition Therapy:  Current Nutrition Therapy:   - Oral Diet:  General    Routes of Feeding: Oral  Liquids: No Restrictions  Daily Fluid Restriction: no  Last Modified Barium Swallow with Video (Video Swallowing Test): not done    Treatments at the Time of Hospital Discharge:   Respiratory Treatments: ***  Oxygen Therapy:  is on oxygen at 2 L/min per nasal cannula.   Ventilator:    - No ventilator support    Rehab Therapies: {THERAPEUTIC INTERVENTION:1712235081}  Weight Bearing Status/Restrictions: No weight bearing restirctions  Other Medical Equipment (for information only, NOT a DME order):  {EQUIPMENT:167918497}  Other Treatments: ***    Patient's personal belongings (please select all that are sent with patient):  Britany    RN SIGNATURE:  Electronically signed by Sophy Corral RN on 11/19/20 at 3:00 PM EST    CASE MANAGEMENT/SOCIAL WORK SECTION    Inpatient Status Date: ***    Readmission Risk Assessment Score:  Readmission Risk              Risk of Unplanned Readmission:        0           Discharging to Facility/ Agency   · Name: [facility]  · Address:  · Phone:  · Fax:    Dialysis Facility (if applicable)   · Name:  · Address:  · Dialysis Schedule:  · Phone:  · Fax:    / signature: Electronically signed by CORTEZ Valdez on 11/19/2020 at 3:28 PM      PHYSICIAN SECTION    Prognosis: {Prognosis:4443963557}    Condition at Discharge: [address] [place] [address] Patient Condition:[id number]}    Rehab Potential (if transferring to Rehab): {Prognosis:9987824042}    Recommended Labs or Other Treatments After Discharge: ***    Physician Certification: I certify the above information and transfer of Marlon Perez  is necessary for the continuing treatment of the diagnosis listed and that she requires [place] for less 30 days.      Update Admission H&P: {LINDAP DME Changes in WNPYN:910503658}    PHYSICIAN SIGNATURE:  {Esignature:135627191}

## 2020-11-19 NOTE — ED NOTES
Pt refuses any more blood sticks at this time. Refusing to use bed pan/ straight cath for urine specimen. Dr. Dipesh Mays made aware as blood cultures were not obtained.       Jesus Bauer RN  11/18/20 1926

## 2020-11-19 NOTE — CARE COORDINATION
Pt does not meet criteria from hospice services. Plan is [facility]. Accepted and transportation arranged with the facility van for 4:00PM. PASSR and envelope o=in soft chart.  Kourtney Juarez RN

## 2020-11-19 NOTE — CARE COORDINATION
Spoke to the pt's daughter, Ottoniel. Provided choices for hospice services. She chose [facility]. Referral made. The pt lives alone and will not be able to return home, nor can she be cared for at the daughter's home. She would like for her to [address] [place] [address], as she has been there in the past. Referral made to [facility]. Will follow. Monika Gonsalves RN

## 2020-11-19 NOTE — PLAN OF CARE
Problem: Skin Integrity:  Goal: Absence of new skin breakdown  Description: Absence of new skin breakdown  Outcome: Met This Shift     Problem: Falls - Risk of:  Goal: Will remain free from falls  Description: Will remain free from falls  Outcome: Met This Shift     Problem: Falls - Risk of:  Goal: Absence of physical injury  Description: Absence of physical injury  Outcome: Met This Shift
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
## 2023-02-14 NOTE — PROGRESS NOTES
Full ID Consult to follow    Strep/enterococcus bactremia  Started on ceftriaxone yesterday  abdo - CT abdo/pelvis wo contrast ordered (cannot give contrast for GFR<40)  Will follow    Add one dose vancomycin pending species identification of strep sps Patient contacted the office stating that a prior auth is required for Gabapentin 0 5 mg  Please review  Thank you!

## (undated) DEVICE — Z INACTIVE USE 2635503 SOLUTION IRRIG 3000ML ST H2O USP UROMATIC PLAS CONT

## (undated) DEVICE — GOWN,SIRUS,FABRNF,XL,20/CS: Brand: MEDLINE

## (undated) DEVICE — CAMERA STRYKER 1488 HD GEN

## (undated) DEVICE — CATHETER F BLLN 5CC 16FR 2 W HYDRGEL COAT LESS TRAUM LUB

## (undated) DEVICE — CYSTO PACK: Brand: MEDLINE INDUSTRIES, INC.

## (undated) DEVICE — BAG DRNGE COMB PK

## (undated) DEVICE — MEDIA CONTRAST ISOVUE  300 10X50ML

## (undated) DEVICE — TUBING, SUCTION, 3/16" X 12', STRAIGHT: Brand: MEDLINE

## (undated) DEVICE — HOSE CONN FOR WST MGMT SYS NEPTUNE 2

## (undated) DEVICE — CANNULA NSL ORAL AD FOR CAPNOFLEX CO2 O2 AIRLFE

## (undated) DEVICE — SOLUTION IV IRRIG WATER 1000ML POUR BRL 2F7114

## (undated) DEVICE — DEFENDO AIR WATER SUCTION AND BIOPSY VALVE KIT FOR  OLYMPUS: Brand: DEFENDO AIR/WATER/SUCTION AND BIOPSY VALVE

## (undated) DEVICE — RADIFOCUS GLIDEWIRE: Brand: GLIDEWIRE

## (undated) DEVICE — CONTAINER SPEC 60ML PH 7NEUTRAL BUFF FRMLN RDY TO USE

## (undated) DEVICE — Z DUP USE 2139333 GUIDEWIRE UROLOGICAL STR STD 0.035 IN X150 CM REG ZIPWIRE LF

## (undated) DEVICE — Z INACTIVE USE 2660664 SOLUTION IRRIG 3000ML 0.9% SOD CHL USP UROMATIC PLAS CONT

## (undated) DEVICE — GAUZE,SPONGE,4"X4",8PLY,STRL,LF,10/TRAY: Brand: MEDLINE

## (undated) DEVICE — READY WET SKIN SCRUB TRAY-LF: Brand: MEDLINE INDUSTRIES, INC.

## (undated) DEVICE — 1.5 FR, 120 CM, NITI TIPLESS STONE BASKET: Brand: HALO

## (undated) DEVICE — FORCEPS BX L160CM JAW DIA2.4MM YEL L CAP W/ NDL DISP RAD

## (undated) DEVICE — BLOCK BITE 60FR RUBBER ADLT DENTAL

## (undated) DEVICE — SOLUTION SURG PREP ANTIMICROBIAL 4 OZ SKIN WND EXIDINE

## (undated) DEVICE — DRAINBAG,ANTI-REFLUX TOWER,L/F,2000ML,LL: Brand: MEDLINE

## (undated) DEVICE — GAUZE SPONGES,8 PLY: Brand: CURITY

## (undated) DEVICE — GLOVE SURG SZ 75 STD WHT LTX SYN POLYMER BEAD REINF ANTI RL

## (undated) DEVICE — 4-PORT MANIFOLD: Brand: NEPTUNE 2

## (undated) DEVICE — GAUZE,SPONGE,POST-OP,4X3,STRL,LF: Brand: MEDLINE